# Patient Record
Sex: MALE | Race: WHITE | Employment: OTHER | URBAN - METROPOLITAN AREA
[De-identification: names, ages, dates, MRNs, and addresses within clinical notes are randomized per-mention and may not be internally consistent; named-entity substitution may affect disease eponyms.]

---

## 2017-01-18 ENCOUNTER — OFFICE VISIT (OUTPATIENT)
Dept: CARDIOLOGY CLINIC | Age: 67
End: 2017-01-18

## 2017-01-18 VITALS
DIASTOLIC BLOOD PRESSURE: 70 MMHG | HEART RATE: 66 BPM | BODY MASS INDEX: 35.42 KG/M2 | WEIGHT: 253 LBS | OXYGEN SATURATION: 97 % | SYSTOLIC BLOOD PRESSURE: 110 MMHG | HEIGHT: 71 IN

## 2017-01-18 DIAGNOSIS — I42.9 CARDIOMYOPATHY (HCC): ICD-10-CM

## 2017-01-18 DIAGNOSIS — I10 ESSENTIAL HYPERTENSION, MALIGNANT: ICD-10-CM

## 2017-01-18 DIAGNOSIS — I25.119 CORONARY ARTERY DISEASE INVOLVING NATIVE HEART WITH ANGINA PECTORIS, UNSPECIFIED VESSEL OR LESION TYPE (HCC): Primary | ICD-10-CM

## 2017-01-18 DIAGNOSIS — I25.119 CORONARY ARTERY DISEASE INVOLVING NATIVE CORONARY ARTERY OF NATIVE HEART WITH ANGINA PECTORIS (HCC): ICD-10-CM

## 2017-01-18 PROCEDURE — 99214 OFFICE O/P EST MOD 30 MIN: CPT | Performed by: NURSE PRACTITIONER

## 2017-01-18 RX ORDER — METOPROLOL SUCCINATE 25 MG/1
25 TABLET, EXTENDED RELEASE ORAL DAILY
COMMUNITY
End: 2017-03-15 | Stop reason: SDUPTHER

## 2017-01-18 RX ORDER — NITROGLYCERIN 0.4 MG/1
0.4 TABLET SUBLINGUAL EVERY 5 MIN PRN
Qty: 25 TABLET | Refills: 0 | Status: SHIPPED | OUTPATIENT
Start: 2017-01-18 | End: 2017-01-24 | Stop reason: SDUPTHER

## 2017-01-18 RX ORDER — ASPIRIN 81 MG/1
81 TABLET, CHEWABLE ORAL DAILY
Status: ON HOLD | COMMUNITY
End: 2021-03-15 | Stop reason: HOSPADM

## 2017-01-18 RX ORDER — HYDROCHLOROTHIAZIDE 25 MG/1
25 TABLET ORAL DAILY
COMMUNITY
End: 2017-01-18 | Stop reason: SDUPTHER

## 2017-01-18 RX ORDER — CLOTRIMAZOLE AND BETAMETHASONE DIPROPIONATE 10; .64 MG/G; MG/G
CREAM TOPICAL 2 TIMES DAILY
COMMUNITY
End: 2017-04-05

## 2017-01-18 RX ORDER — HYDROCHLOROTHIAZIDE 25 MG/1
25 TABLET ORAL DAILY
Qty: 30 TABLET | Refills: 5 | Status: SHIPPED | OUTPATIENT
Start: 2017-01-18 | End: 2017-04-05

## 2017-01-24 RX ORDER — NITROGLYCERIN 0.4 MG/1
0.4 TABLET SUBLINGUAL EVERY 5 MIN PRN
Qty: 25 TABLET | Refills: 0 | Status: SHIPPED | OUTPATIENT
Start: 2017-01-24 | End: 2017-02-13 | Stop reason: SDUPTHER

## 2017-02-01 ENCOUNTER — OFFICE VISIT (OUTPATIENT)
Dept: CARDIOLOGY CLINIC | Age: 67
End: 2017-02-01

## 2017-02-01 VITALS
HEIGHT: 71 IN | WEIGHT: 248 LBS | SYSTOLIC BLOOD PRESSURE: 150 MMHG | BODY MASS INDEX: 34.72 KG/M2 | HEART RATE: 64 BPM | DIASTOLIC BLOOD PRESSURE: 80 MMHG

## 2017-02-01 DIAGNOSIS — I25.119 CORONARY ARTERY DISEASE INVOLVING NATIVE CORONARY ARTERY OF NATIVE HEART WITH ANGINA PECTORIS (HCC): ICD-10-CM

## 2017-02-01 DIAGNOSIS — E78.2 MIXED HYPERLIPIDEMIA: ICD-10-CM

## 2017-02-01 DIAGNOSIS — I10 ESSENTIAL HYPERTENSION: Primary | ICD-10-CM

## 2017-02-01 DIAGNOSIS — I10 ESSENTIAL HYPERTENSION, BENIGN: ICD-10-CM

## 2017-02-01 PROCEDURE — 99214 OFFICE O/P EST MOD 30 MIN: CPT | Performed by: NURSE PRACTITIONER

## 2017-02-02 LAB
ANION GAP SERPL CALCULATED.3IONS-SCNC: 8 MMOL/L (ref 7–16)
BUN BLDV-MCNC: 25 MG/DL (ref 7–18)
CALCIUM SERPL-MCNC: 8.9 MG/DL (ref 8.5–10.1)
CHLORIDE BLD-SCNC: 105 MMOL/L (ref 98–107)
CO2: 25 MMOL/L (ref 21–32)
CREATININE + EGFR PANEL: 0.93 MG/DL (ref 0.6–1.3)
GFR AFRICAN AMERICAN: > 60 ML/MIN/1.73M2
GFR NON-AFRICAN AMERICAN: > 60 ML/MIN/1.73M2
GLUCOSE: 94 MG/DL (ref 74–106)
POTASSIUM SERPL-SCNC: 3.9 MMOL/L (ref 3.5–5.1)
SODIUM BLD-SCNC: 138 MMOL/L (ref 136–145)

## 2017-02-13 RX ORDER — NITROGLYCERIN 0.4 MG/1
0.4 TABLET SUBLINGUAL EVERY 5 MIN PRN
Qty: 25 TABLET | Refills: 1 | Status: SHIPPED | OUTPATIENT
Start: 2017-02-13 | End: 2017-04-10 | Stop reason: SDUPTHER

## 2017-03-15 RX ORDER — METOPROLOL SUCCINATE 50 MG/1
50 TABLET, EXTENDED RELEASE ORAL DAILY
Qty: 30 TABLET | Refills: 6 | Status: SHIPPED | OUTPATIENT
Start: 2017-03-15 | End: 2017-04-05

## 2017-03-22 ENCOUNTER — OFFICE VISIT (OUTPATIENT)
Dept: CARDIOLOGY CLINIC | Age: 67
End: 2017-03-22

## 2017-03-22 VITALS
DIASTOLIC BLOOD PRESSURE: 90 MMHG | WEIGHT: 241 LBS | SYSTOLIC BLOOD PRESSURE: 160 MMHG | HEART RATE: 64 BPM | BODY MASS INDEX: 33.74 KG/M2 | HEIGHT: 71 IN

## 2017-03-22 DIAGNOSIS — J20.9 ACUTE BRONCHITIS, UNSPECIFIED ORGANISM: ICD-10-CM

## 2017-03-22 DIAGNOSIS — I25.119 CORONARY ARTERY DISEASE INVOLVING NATIVE CORONARY ARTERY OF NATIVE HEART WITH ANGINA PECTORIS (HCC): ICD-10-CM

## 2017-03-22 DIAGNOSIS — I42.9 CARDIOMYOPATHY (HCC): ICD-10-CM

## 2017-03-22 DIAGNOSIS — R06.02 SOB (SHORTNESS OF BREATH): Primary | ICD-10-CM

## 2017-03-22 PROCEDURE — 99214 OFFICE O/P EST MOD 30 MIN: CPT | Performed by: NURSE PRACTITIONER

## 2017-03-22 RX ORDER — CARVEDILOL 12.5 MG/1
12.5 TABLET ORAL 2 TIMES DAILY WITH MEALS
Qty: 60 TABLET | Refills: 3 | Status: SHIPPED | OUTPATIENT
Start: 2017-03-22 | End: 2017-04-05 | Stop reason: SDUPTHER

## 2017-03-28 LAB
ANION GAP SERPL CALCULATED.3IONS-SCNC: 8 MMOL/L (ref 7–16)
B-TYPE NATRIURETIC PEPTIDE: 2131 PG/ML
BUN BLDV-MCNC: 24 MG/DL (ref 7–18)
CALCIUM SERPL-MCNC: 8.8 MG/DL (ref 8.5–10.1)
CHLORIDE BLD-SCNC: 105 MMOL/L (ref 98–107)
CO2: 26 MMOL/L (ref 21–32)
CREATININE + EGFR PANEL: 0.78 MG/DL (ref 0.6–1.3)
GFR AFRICAN AMERICAN: > 60 ML/MIN/1.73M2
GFR NON-AFRICAN AMERICAN: > 60 ML/MIN/1.73M2
GLUCOSE: 97 MG/DL (ref 74–106)
POTASSIUM SERPL-SCNC: 3.6 MMOL/L (ref 3.5–5.1)
SODIUM BLD-SCNC: 139 MMOL/L (ref 136–145)

## 2017-04-03 RX ORDER — ROFLUMILAST 500 UG/1
TABLET ORAL
Qty: 30 TABLET | Refills: 11 | Status: SHIPPED | OUTPATIENT
Start: 2017-04-03 | End: 2018-03-23 | Stop reason: SDUPTHER

## 2017-04-03 RX ORDER — TIOTROPIUM BROMIDE 18 UG/1
CAPSULE ORAL; RESPIRATORY (INHALATION)
Qty: 30 CAPSULE | Refills: 11 | Status: SHIPPED | OUTPATIENT
Start: 2017-04-03 | End: 2018-01-24

## 2017-04-04 ENCOUNTER — OFFICE VISIT (OUTPATIENT)
Dept: PULMONOLOGY | Age: 67
End: 2017-04-04

## 2017-04-04 ENCOUNTER — TELEPHONE (OUTPATIENT)
Dept: CARDIOLOGY CLINIC | Age: 67
End: 2017-04-04

## 2017-04-04 VITALS
OXYGEN SATURATION: 98 % | WEIGHT: 242 LBS | BODY MASS INDEX: 33.75 KG/M2 | DIASTOLIC BLOOD PRESSURE: 96 MMHG | HEART RATE: 64 BPM | SYSTOLIC BLOOD PRESSURE: 145 MMHG

## 2017-04-04 DIAGNOSIS — I25.119 CORONARY ARTERY DISEASE INVOLVING NATIVE CORONARY ARTERY OF NATIVE HEART WITH ANGINA PECTORIS (HCC): ICD-10-CM

## 2017-04-04 DIAGNOSIS — R06.02 SHORTNESS OF BREATH: Primary | ICD-10-CM

## 2017-04-04 DIAGNOSIS — I42.9 CARDIOMYOPATHY (HCC): ICD-10-CM

## 2017-04-04 DIAGNOSIS — R05.3 CHRONIC COUGH: ICD-10-CM

## 2017-04-04 DIAGNOSIS — I10 ESSENTIAL HYPERTENSION, BENIGN: ICD-10-CM

## 2017-04-04 DIAGNOSIS — J44.9 COPD, MODERATE (HCC): ICD-10-CM

## 2017-04-04 PROCEDURE — 99214 OFFICE O/P EST MOD 30 MIN: CPT | Performed by: INTERNAL MEDICINE

## 2017-04-04 RX ORDER — ALBUTEROL SULFATE 90 UG/1
2 AEROSOL, METERED RESPIRATORY (INHALATION) EVERY 4 HOURS PRN
Qty: 1 INHALER | Refills: 5 | Status: SHIPPED | OUTPATIENT
Start: 2017-04-04 | End: 2018-07-11 | Stop reason: SDUPTHER

## 2017-04-04 RX ORDER — DOXYCYCLINE HYCLATE 100 MG/1
100 CAPSULE ORAL DAILY
Qty: 10 CAPSULE | Refills: 3 | Status: SHIPPED | OUTPATIENT
Start: 2017-04-04 | End: 2017-04-05

## 2017-04-05 ENCOUNTER — OFFICE VISIT (OUTPATIENT)
Dept: CARDIOLOGY CLINIC | Age: 67
End: 2017-04-05

## 2017-04-05 VITALS
DIASTOLIC BLOOD PRESSURE: 74 MMHG | HEART RATE: 70 BPM | HEIGHT: 71 IN | SYSTOLIC BLOOD PRESSURE: 154 MMHG | BODY MASS INDEX: 33.75 KG/M2

## 2017-04-05 DIAGNOSIS — I10 ESSENTIAL HYPERTENSION, BENIGN: Primary | ICD-10-CM

## 2017-04-05 PROCEDURE — 99999 PR OFFICE/OUTPT VISIT,PROCEDURE ONLY: CPT | Performed by: NURSE PRACTITIONER

## 2017-04-05 RX ORDER — DIGOXIN 125 MCG
TABLET ORAL DAILY
COMMUNITY
End: 2020-12-16

## 2017-04-05 RX ORDER — CARVEDILOL 12.5 MG/1
12.5 TABLET ORAL 2 TIMES DAILY WITH MEALS
Qty: 60 TABLET | Refills: 6 | Status: SHIPPED | OUTPATIENT
Start: 2017-04-05 | End: 2017-05-03

## 2017-04-05 RX ORDER — LOSARTAN POTASSIUM 25 MG/1
50 TABLET ORAL DAILY
COMMUNITY
End: 2017-04-05 | Stop reason: SDUPTHER

## 2017-04-05 RX ORDER — LOSARTAN POTASSIUM 50 MG/1
50 TABLET ORAL DAILY
Qty: 30 TABLET | Refills: 6 | Status: SHIPPED | OUTPATIENT
Start: 2017-04-05 | End: 2017-06-27 | Stop reason: DRUGHIGH

## 2017-04-10 RX ORDER — NITROGLYCERIN 0.4 MG/1
0.4 TABLET SUBLINGUAL EVERY 5 MIN PRN
Qty: 25 TABLET | Refills: 1 | Status: SHIPPED | OUTPATIENT
Start: 2017-04-10 | End: 2017-04-21 | Stop reason: SDUPTHER

## 2017-04-21 RX ORDER — NITROGLYCERIN 0.4 MG/1
0.4 TABLET SUBLINGUAL EVERY 5 MIN PRN
Qty: 25 TABLET | Refills: 1 | Status: SHIPPED | OUTPATIENT
Start: 2017-04-21 | End: 2018-08-30 | Stop reason: SDUPTHER

## 2017-05-03 ENCOUNTER — OFFICE VISIT (OUTPATIENT)
Dept: CARDIOLOGY CLINIC | Age: 67
End: 2017-05-03

## 2017-05-03 VITALS
BODY MASS INDEX: 33.6 KG/M2 | HEIGHT: 71 IN | DIASTOLIC BLOOD PRESSURE: 60 MMHG | HEART RATE: 58 BPM | SYSTOLIC BLOOD PRESSURE: 120 MMHG | WEIGHT: 240 LBS

## 2017-05-03 DIAGNOSIS — I10 ESSENTIAL HYPERTENSION, BENIGN: ICD-10-CM

## 2017-05-03 DIAGNOSIS — I42.9 CARDIOMYOPATHY (HCC): Primary | ICD-10-CM

## 2017-05-03 DIAGNOSIS — E78.2 MIXED HYPERLIPIDEMIA: ICD-10-CM

## 2017-05-03 PROCEDURE — 99214 OFFICE O/P EST MOD 30 MIN: CPT | Performed by: NURSE PRACTITIONER

## 2017-05-03 RX ORDER — CARVEDILOL 25 MG/1
25 TABLET ORAL 2 TIMES DAILY WITH MEALS
Qty: 60 TABLET | Refills: 3 | Status: SHIPPED | OUTPATIENT
Start: 2017-05-03 | End: 2017-05-16 | Stop reason: SDUPTHER

## 2017-05-16 ENCOUNTER — TELEPHONE (OUTPATIENT)
Dept: CARDIOLOGY CLINIC | Age: 67
End: 2017-05-16

## 2017-05-16 RX ORDER — CARVEDILOL 25 MG/1
25 TABLET ORAL 2 TIMES DAILY WITH MEALS
Qty: 60 TABLET | Refills: 3 | Status: SHIPPED | OUTPATIENT
Start: 2017-05-16 | End: 2017-10-03 | Stop reason: SDUPTHER

## 2017-06-13 ENCOUNTER — TELEPHONE (OUTPATIENT)
Dept: CARDIOLOGY CLINIC | Age: 67
End: 2017-06-13

## 2017-06-20 ENCOUNTER — TELEPHONE (OUTPATIENT)
Dept: CARDIOLOGY CLINIC | Age: 67
End: 2017-06-20

## 2017-06-22 ENCOUNTER — TELEPHONE (OUTPATIENT)
Dept: CARDIOLOGY CLINIC | Age: 67
End: 2017-06-22

## 2017-06-22 RX ORDER — FUROSEMIDE 40 MG/1
40 TABLET ORAL DAILY
Qty: 30 TABLET | Refills: 3 | Status: SHIPPED | OUTPATIENT
Start: 2017-06-22 | End: 2017-10-16 | Stop reason: SDUPTHER

## 2017-06-27 ENCOUNTER — TELEPHONE (OUTPATIENT)
Dept: CARDIOLOGY CLINIC | Age: 67
End: 2017-06-27

## 2017-06-27 RX ORDER — LOSARTAN POTASSIUM 100 MG/1
100 TABLET ORAL DAILY
Qty: 30 TABLET | Refills: 5 | Status: SHIPPED | OUTPATIENT
Start: 2017-06-27 | End: 2018-01-09 | Stop reason: SDUPTHER

## 2017-07-03 ENCOUNTER — TELEPHONE (OUTPATIENT)
Dept: CARDIOLOGY CLINIC | Age: 67
End: 2017-07-03

## 2017-07-03 RX ORDER — DOXAZOSIN MESYLATE 4 MG/1
4 TABLET ORAL NIGHTLY
Qty: 30 TABLET | Refills: 3 | Status: SHIPPED | OUTPATIENT
Start: 2017-07-03 | End: 2017-10-20 | Stop reason: SDUPTHER

## 2017-07-14 ENCOUNTER — HOSPITAL ENCOUNTER (OUTPATIENT)
Dept: OTHER | Age: 67
Discharge: HOME | End: 2017-07-14
Attending: NURSE PRACTITIONER | Admitting: NURSE PRACTITIONER

## 2017-07-19 ENCOUNTER — OFFICE VISIT (OUTPATIENT)
Dept: CARDIOLOGY CLINIC | Age: 67
End: 2017-07-19

## 2017-07-19 VITALS
HEIGHT: 71 IN | SYSTOLIC BLOOD PRESSURE: 130 MMHG | BODY MASS INDEX: 34.58 KG/M2 | DIASTOLIC BLOOD PRESSURE: 60 MMHG | WEIGHT: 247 LBS | HEART RATE: 62 BPM

## 2017-07-19 DIAGNOSIS — I42.9 CARDIOMYOPATHY (HCC): Primary | ICD-10-CM

## 2017-07-19 DIAGNOSIS — I10 ESSENTIAL HYPERTENSION, BENIGN: ICD-10-CM

## 2017-07-19 DIAGNOSIS — E78.2 MIXED HYPERLIPIDEMIA: ICD-10-CM

## 2017-07-19 DIAGNOSIS — I25.119 CORONARY ARTERY DISEASE INVOLVING NATIVE CORONARY ARTERY OF NATIVE HEART WITH ANGINA PECTORIS (HCC): ICD-10-CM

## 2017-07-19 DIAGNOSIS — R06.02 SOB (SHORTNESS OF BREATH): ICD-10-CM

## 2017-07-19 PROCEDURE — 1123F ACP DISCUSS/DSCN MKR DOCD: CPT | Performed by: NURSE PRACTITIONER

## 2017-07-19 PROCEDURE — G8417 CALC BMI ABV UP PARAM F/U: HCPCS | Performed by: NURSE PRACTITIONER

## 2017-07-19 PROCEDURE — 99214 OFFICE O/P EST MOD 30 MIN: CPT | Performed by: NURSE PRACTITIONER

## 2017-07-19 PROCEDURE — 4040F PNEUMOC VAC/ADMIN/RCVD: CPT | Performed by: NURSE PRACTITIONER

## 2017-07-19 PROCEDURE — G8598 ASA/ANTIPLAT THER USED: HCPCS | Performed by: NURSE PRACTITIONER

## 2017-07-19 PROCEDURE — 1036F TOBACCO NON-USER: CPT | Performed by: NURSE PRACTITIONER

## 2017-07-19 PROCEDURE — G8427 DOCREV CUR MEDS BY ELIG CLIN: HCPCS | Performed by: NURSE PRACTITIONER

## 2017-07-19 PROCEDURE — 3017F COLORECTAL CA SCREEN DOC REV: CPT | Performed by: NURSE PRACTITIONER

## 2017-08-04 ENCOUNTER — HOSPITAL ENCOUNTER (OUTPATIENT)
Dept: OTHER | Age: 67
Discharge: HOME | End: 2017-08-04
Attending: NURSE PRACTITIONER | Admitting: NURSE PRACTITIONER

## 2017-08-04 LAB
LEFT VENTRICULAR EJECTION FRACTION HIGH VALUE: 50 %
LEFT VENTRICULAR EJECTION FRACTION MODE: NORMAL
LV EF: 50 %
LVEF MODALITY: NORMAL

## 2017-08-31 ENCOUNTER — TELEPHONE (OUTPATIENT)
Dept: CARDIOLOGY CLINIC | Age: 67
End: 2017-08-31

## 2017-09-20 ENCOUNTER — OFFICE VISIT (OUTPATIENT)
Dept: CARDIOLOGY CLINIC | Age: 67
End: 2017-09-20

## 2017-09-20 VITALS
DIASTOLIC BLOOD PRESSURE: 60 MMHG | BODY MASS INDEX: 34.72 KG/M2 | HEIGHT: 71 IN | HEART RATE: 62 BPM | SYSTOLIC BLOOD PRESSURE: 120 MMHG | WEIGHT: 248 LBS

## 2017-09-20 DIAGNOSIS — I10 ESSENTIAL HYPERTENSION: Primary | ICD-10-CM

## 2017-09-20 DIAGNOSIS — E78.2 MIXED HYPERLIPIDEMIA: ICD-10-CM

## 2017-09-20 DIAGNOSIS — I25.119 CORONARY ARTERY DISEASE INVOLVING NATIVE CORONARY ARTERY OF NATIVE HEART WITH ANGINA PECTORIS (HCC): ICD-10-CM

## 2017-09-20 DIAGNOSIS — I10 ESSENTIAL HYPERTENSION, MALIGNANT: ICD-10-CM

## 2017-09-20 LAB
ALT SERPL-CCNC: 18 U/L (ref 12–78)
ANION GAP SERPL CALCULATED.3IONS-SCNC: 7 MMOL/L (ref 7–16)
AST SERPL-CCNC: 12 U/L (ref 15–37)
B-TYPE NATRIURETIC PEPTIDE: 402 PG/ML
BUN BLDV-MCNC: 30 MG/DL (ref 7–18)
CALCIUM SERPL-MCNC: 9 MG/DL (ref 8.5–10.1)
CHLORIDE BLD-SCNC: 104 MMOL/L (ref 98–107)
CHOLESTEROL, STONE: 113 MG/DL
CO2: 27 MMOL/L (ref 21–32)
CREATININE + EGFR PANEL: 0.9 MG/DL (ref 0.6–1.3)
GFR AFRICAN AMERICAN: > 60 ML/MIN/1.73M2
GFR NON-AFRICAN AMERICAN: > 60 ML/MIN/1.73M2
GLUCOSE: 102 MG/DL (ref 74–106)
HDLC SERPL-MCNC: 35 MG/DL (ref 40–60)
LDL CHOLESTEROL CALCULATED: 60 MG/DL (ref 0–99)
POTASSIUM SERPL-SCNC: 3.3 MMOL/L (ref 3.5–5.1)
SODIUM BLD-SCNC: 138 MMOL/L (ref 136–145)
TRIGL SERPL-MCNC: 89 MG/DL (ref 0–149)
VLDLC SERPL CALC-MCNC: 18 MG/DL (ref 0–40)

## 2017-09-20 PROCEDURE — 1123F ACP DISCUSS/DSCN MKR DOCD: CPT | Performed by: NURSE PRACTITIONER

## 2017-09-20 PROCEDURE — 1036F TOBACCO NON-USER: CPT | Performed by: NURSE PRACTITIONER

## 2017-09-20 PROCEDURE — 99214 OFFICE O/P EST MOD 30 MIN: CPT | Performed by: NURSE PRACTITIONER

## 2017-09-20 PROCEDURE — 4040F PNEUMOC VAC/ADMIN/RCVD: CPT | Performed by: NURSE PRACTITIONER

## 2017-09-20 PROCEDURE — G8598 ASA/ANTIPLAT THER USED: HCPCS | Performed by: NURSE PRACTITIONER

## 2017-09-20 PROCEDURE — 3017F COLORECTAL CA SCREEN DOC REV: CPT | Performed by: NURSE PRACTITIONER

## 2017-09-20 PROCEDURE — G8417 CALC BMI ABV UP PARAM F/U: HCPCS | Performed by: NURSE PRACTITIONER

## 2017-09-20 PROCEDURE — G8427 DOCREV CUR MEDS BY ELIG CLIN: HCPCS | Performed by: NURSE PRACTITIONER

## 2017-09-20 RX ORDER — POTASSIUM CHLORIDE 20 MEQ/1
20 TABLET, EXTENDED RELEASE ORAL DAILY
Qty: 90 TABLET | Refills: 3 | Status: SHIPPED | OUTPATIENT
Start: 2017-09-20 | End: 2018-08-20 | Stop reason: SDUPTHER

## 2017-10-03 RX ORDER — CARVEDILOL 25 MG/1
TABLET ORAL
Qty: 60 TABLET | Refills: 0 | Status: SHIPPED | OUTPATIENT
Start: 2017-10-03 | End: 2017-11-09 | Stop reason: SDUPTHER

## 2017-10-04 ENCOUNTER — TELEPHONE (OUTPATIENT)
Dept: CARDIOLOGY CLINIC | Age: 67
End: 2017-10-04

## 2017-10-16 RX ORDER — FUROSEMIDE 40 MG/1
40 TABLET ORAL DAILY
Qty: 30 TABLET | Refills: 3 | Status: SHIPPED | OUTPATIENT
Start: 2017-10-16 | End: 2017-12-12 | Stop reason: SDUPTHER

## 2017-10-23 ENCOUNTER — TELEPHONE (OUTPATIENT)
Dept: PULMONOLOGY | Age: 67
End: 2017-10-23

## 2017-10-24 RX ORDER — DOXAZOSIN MESYLATE 4 MG/1
4 TABLET ORAL NIGHTLY
Qty: 30 TABLET | Refills: 7 | Status: SHIPPED | OUTPATIENT
Start: 2017-10-24 | End: 2017-12-22 | Stop reason: SDUPTHER

## 2017-10-24 NOTE — TELEPHONE ENCOUNTER
Patient called back stating he will be out of spiriva today and will be out of tudorza he will be out tomorrow.

## 2017-11-14 RX ORDER — CARVEDILOL 25 MG/1
TABLET ORAL
Qty: 90 TABLET | Refills: 3 | Status: SHIPPED | OUTPATIENT
Start: 2017-11-14 | End: 2017-12-27 | Stop reason: SDUPTHER

## 2017-12-12 RX ORDER — FUROSEMIDE 40 MG/1
40 TABLET ORAL DAILY
Qty: 90 TABLET | Refills: 3 | Status: SHIPPED | OUTPATIENT
Start: 2017-12-12 | End: 2018-10-10 | Stop reason: SDUPTHER

## 2017-12-22 RX ORDER — DOXAZOSIN MESYLATE 4 MG/1
4 TABLET ORAL NIGHTLY
Qty: 90 TABLET | Refills: 1 | Status: SHIPPED | OUTPATIENT
Start: 2017-12-22 | End: 2018-07-09 | Stop reason: SDUPTHER

## 2017-12-27 RX ORDER — CARVEDILOL 25 MG/1
TABLET ORAL
Qty: 90 TABLET | Refills: 3 | Status: SHIPPED | OUTPATIENT
Start: 2017-12-27 | End: 2018-06-22 | Stop reason: SDUPTHER

## 2018-01-09 RX ORDER — LOSARTAN POTASSIUM 100 MG/1
100 TABLET ORAL DAILY
Qty: 30 TABLET | Refills: 11 | Status: SHIPPED | OUTPATIENT
Start: 2018-01-09 | End: 2018-08-14 | Stop reason: SDUPTHER

## 2018-01-24 ENCOUNTER — OFFICE VISIT (OUTPATIENT)
Dept: CARDIOLOGY CLINIC | Age: 68
End: 2018-01-24

## 2018-01-24 VITALS
DIASTOLIC BLOOD PRESSURE: 70 MMHG | BODY MASS INDEX: 36.4 KG/M2 | HEIGHT: 71 IN | WEIGHT: 260 LBS | SYSTOLIC BLOOD PRESSURE: 124 MMHG | HEART RATE: 56 BPM

## 2018-01-24 DIAGNOSIS — I25.119 CORONARY ARTERY DISEASE INVOLVING NATIVE CORONARY ARTERY OF NATIVE HEART WITH ANGINA PECTORIS (HCC): Primary | ICD-10-CM

## 2018-01-24 DIAGNOSIS — R06.02 SHORTNESS OF BREATH: ICD-10-CM

## 2018-01-24 DIAGNOSIS — I10 ESSENTIAL HYPERTENSION, MALIGNANT: ICD-10-CM

## 2018-01-24 LAB
ALBUMIN SERUM: 3.3 G/DL (ref 3.4–5)
ANION GAP SERPL CALCULATED.3IONS-SCNC: 8 MMOL/L (ref 7–16)
BUN BLDV-MCNC: 27 MG/DL (ref 7–18)
CALCIUM SERPL-MCNC: 8.8 MG/DL (ref 8.5–10.1)
CHLORIDE BLD-SCNC: 103 MMOL/L (ref 98–107)
CO2: 27 MMOL/L (ref 21–32)
CREATININE + EGFR PANEL: 1 MG/DL (ref 0.6–1.3)
GFR AFRICAN AMERICAN: > 60 ML/MIN/1.73M2
GFR NON-AFRICAN AMERICAN: > 60 ML/MIN/1.73M2
GLUCOSE: 98 MG/DL (ref 74–106)
PHOSPHORUS: 3.6 MG/DL (ref 2.5–4.9)
POTASSIUM SERPL-SCNC: 3.7 MMOL/L (ref 3.5–5.1)
SODIUM BLD-SCNC: 138 MMOL/L (ref 136–145)

## 2018-01-24 PROCEDURE — 4040F PNEUMOC VAC/ADMIN/RCVD: CPT | Performed by: NURSE PRACTITIONER

## 2018-01-24 PROCEDURE — G8427 DOCREV CUR MEDS BY ELIG CLIN: HCPCS | Performed by: NURSE PRACTITIONER

## 2018-01-24 PROCEDURE — 1036F TOBACCO NON-USER: CPT | Performed by: NURSE PRACTITIONER

## 2018-01-24 PROCEDURE — G8484 FLU IMMUNIZE NO ADMIN: HCPCS | Performed by: NURSE PRACTITIONER

## 2018-01-24 PROCEDURE — G8417 CALC BMI ABV UP PARAM F/U: HCPCS | Performed by: NURSE PRACTITIONER

## 2018-01-24 PROCEDURE — G8598 ASA/ANTIPLAT THER USED: HCPCS | Performed by: NURSE PRACTITIONER

## 2018-01-24 PROCEDURE — 99214 OFFICE O/P EST MOD 30 MIN: CPT | Performed by: NURSE PRACTITIONER

## 2018-01-24 PROCEDURE — 3017F COLORECTAL CA SCREEN DOC REV: CPT | Performed by: NURSE PRACTITIONER

## 2018-01-24 PROCEDURE — 1123F ACP DISCUSS/DSCN MKR DOCD: CPT | Performed by: NURSE PRACTITIONER

## 2018-01-24 NOTE — LETTER
[address] Cardiology [facility]  [address] [place] [address]  Phone: [phone]  Fax: [phone]    Emilia London NP        February 2, 2018     [place], [place] [address]    Patient: Trung Desai  MR Number: [id number]  YOB: 1950  Date of Visit: 1/24/2018    Dear Dr. Gu:    Thank you for the request for consultation for Jane Benavidez to me for the evaluation of Mr Eric Nieves. Below are the relevant portions of my assessment and plan of care. [address]     Outpatient Follow Up Note    Trung Desai is 80 y.o. male who presents today for FU. He woke up with chest pain 1/11/17, had acute anterior wall  MI/STEMI, taken directly to cath lab had cardiogenic shock, had PCI to LAD. Echo showed decrease in EF to  30-35%. He had recent echo and EF is now 50%. He has not had any admissions for heart failure. He has history of HTN, LVH and hyperlipidemia. Subjective: The patient does not have swelling. The patient is not experiencing palpitations or dizziness. He has not had any chest pain, SOB, no palpitations, edema. He has gained a little weight back.         Past Medical History:   Diagnosis Date    COPD [facility] obstructive [facility] disease) [facility]     Coronary artery disease involving native coronary artery of native heart with angina pectoris ([facility] [address] 1/18/2017    Depression     Diabetes mellitus (HCC)     Dysphagia     Heart enlarged     Hyperlipidemia     Hypertension     Obesity     Pneumonia     Sleep apnea     has cpap but hasn't used \"in a long time\"     Social History:    History   Smoking Status    Former Smoker    Packs/day: 0.25    Years: 25.00    Quit date: 1/1/2011   Smokeless Tobacco    Never Used     Comment: more than 5 cigars per day     Current Medications:  Current Outpatient Prescriptions   Medication Sig Dispense Refill  losartan (COZAAR) 100 MG tablet TAKE 1 TABLET BY MOUTH DAILY 30 tablet 11    carvedilol (COREG) 25 MG tablet TAKE 1 TABLET BY MOUTH TWICE DAILY WITH MEALS 90 tablet 3    doxazosin (CARDURA) 4 MG tablet Take 1 tablet by mouth nightly 90 tablet 1    furosemide (LASIX) 40 MG tablet Take 1 tablet by mouth daily 90 tablet 3    ticagrelor (BRILINTA) 90 MG TABS tablet Take 1 tablet by mouth 2 times daily 180 tablet 3    potassium chloride (KLOR-CON M) 20 MEQ extended release tablet Take 1 tablet by mouth daily 90 tablet 3    digoxin (LANOXIN) 125 MCG tablet Take 125 mcg by mouth daily 1/2 tab daily      albuterol sulfate HFA (PROVENTIL HFA) 108 (90 BASE) MCG/ACT inhaler Inhale 2 puffs into the lungs every 4 hours as needed for Wheezing 1 Inhaler 5    DALIRESP 500 MCG tablet TAKE 1 TABLET BY MOUTH EVERY DAY 30 tablet 11    aspirin 81 MG chewable tablet Take 81 mg by mouth daily      clonazePAM (KLONOPIN) 1 MG tablet Take 1 mg by mouth 2 times daily as needed for Anxiety      metFORMIN (GLUCOPHAGE) 500 MG tablet Take 500 mg by mouth 2 times daily (with meals)      simvastatin (ZOCOR) 40 MG tablet Take 0.5 tablets by mouth nightly 30 tablet 3    nitroGLYCERIN (NITROSTAT) 0.4 MG SL tablet Place 1 tablet under the tongue every 5 minutes as needed for Chest pain 25 tablet 1     No current facility-administered medications for this visit. REVIEW OF SYSTEMS:    CONSTITUTIONAL: No major weight gain or loss, fatigue, weakness, night sweats treated with clonidine   HEENT: No new vision difficulties or ringing in the ears. RESPIRATORY: No new SOB, PND, orthopnea ,  - cough. CARDIOVASCULAR: See HPI  GI: No nausea, vomiting, diarrhea, constipation, abdominal pain or changes in bowel habits. : No urinary frequency, urgency, incontinence hematuria or dysuria. SKIN: No cyanosis or skin lesions. MUSCULOSKELETAL: No new muscle or joint pain. NEUROLOGICAL: No syncope or TIA-like symptoms. If you have questions, please do not hesitate to call me. I look forward to following Chrissy Hernandez along with you.     Sincerely,        Kristine Azul, NP

## 2018-01-24 NOTE — LETTER
LDLCALC 60 09/20/2017    LDLCALC 72 08/07/2015    LDLCALC 82 04/16/2012     Lab Results   Component Value Date    LABVLDL 18 09/20/2017    LABVLDL 10 08/07/2015    LABVLDL 13 04/16/2012     Radiology Review:  Pertinent images / reports were reviewed as a part of this visit and reveals the following:  3/16 lexiscan   There is an inferior fixed defect consistent with diaphragmatic attenuation. There is no evidence of stress induced ischemia. Normal LV function. ECHO [facility] 1/17  EF 25-30%       Cardiac cath: '04: 30% origin LAD, 30-40% mid-LAD    CATH 1/17_ single vessel disease 100% prox LAD, overlapping synergy stents to prox LAD, successful POBA of distal LAD with balloon, LV 30-35%    Assessment:     1. HTN (hypertension) stable    2. Cardiomyopathy-since anterior wall MI 1/17   no signs of heart failure, EF on recent echo 50%          3. Anterior wall MI- overlapping stents to LAD 1/17 no chest pain, on brilenta  4. Hyperlipidemia- 1/17  TC-103, Trig-71, HDL-335, LDL-54 on zocor    9/17  TC-113, Trig-89, HDL-35, LDL 60    Plan:   No change in meds  FU 6 months    The patient is not currently smoking. The risks related to smoking were reviewed with the patient. Recommend maintaining a smoke-free lifestyle. Daily weight, low sodium diet were discussed. Patient instructed to call the office with a weight gain: > 3 # over night or 5# in one week; swelling, SOB/orthopnea/PND        Tobacco Cessation Counseling-Y/  Blood Pressure Retake-Y  Documentation of Communication with PCP-Y/  CAD Antiplatelet Therapy-Y/  CAD Lipid Lowering Therapy-Y/  HF-A Fib Warfarin Therapy-/N      Thank you for allowing to us to participate in the care of Angela Prior. [address]                  If you have questions, please do not hesitate to call me. I look forward to following Anni Arango along with you.     Sincerely,        Silviano Salas NP

## 2018-02-02 NOTE — COMMUNICATION BODY
[address]     Outpatient Follow Up Note    Peter Jacobsen is 80 y.o. male who presents today for FU. He woke up with chest pain 1/11/17, had acute anterior wall  MI/STEMI, taken directly to cath lab had cardiogenic shock, had PCI to LAD. Echo showed decrease in EF to  30-35%. He had recent echo and EF is now 50%. He has not had any admissions for heart failure. He has history of HTN, LVH and hyperlipidemia. Subjective: The patient does not have swelling. The patient is not experiencing palpitations or dizziness. He has not had any chest pain, SOB, no palpitations, edema. He has gained a little weight back.         Past Medical History:   Diagnosis Date    COPD [facility] obstructive [facility] disease) ([facility] [address]     Coronary artery disease involving native coronary artery of native heart with angina pectoris ([facility] [address] 1/18/2017    Depression     Diabetes mellitus ([facility] [address] [facility]     Dysphagia     Heart enlarged     Hyperlipidemia     Hypertension     Obesity     Pneumonia     Sleep apnea     has cpap but hasn't used \"in a long time\"     Social History:    History   Smoking Status    Former Smoker    Packs/day: 0.25    Years: 25.00    Quit date: 1/1/2011   Smokeless Tobacco    Never Used     Comment: more than 5 cigars per day     Current Medications:  Current Outpatient Prescriptions   Medication Sig Dispense Refill    losartan (COZAAR) 100 MG tablet TAKE 1 TABLET BY MOUTH DAILY 30 tablet 11    carvedilol (COREG) 25 MG tablet TAKE 1 TABLET BY MOUTH TWICE DAILY WITH MEALS 90 tablet 3    doxazosin (CARDURA) 4 MG tablet Take 1 tablet by mouth nightly 90 tablet 1    furosemide (LASIX) 40 MG tablet Take 1 tablet by mouth daily 90 tablet 3    ticagrelor (BRILINTA) 90 MG TABS tablet Take 1 tablet by mouth 2 times daily 180 tablet 3    potassium chloride (KLOR-CON M) 20 MEQ extended release tablet Take 1 tablet by mouth daily 90 tablet 3    digoxin (LANOXIN) 125 MCG tablet Take 125 mcg by

## 2018-02-20 ENCOUNTER — OFFICE VISIT (OUTPATIENT)
Dept: PULMONOLOGY | Age: 68
End: 2018-02-20

## 2018-02-20 VITALS
BODY MASS INDEX: 36.4 KG/M2 | HEART RATE: 84 BPM | SYSTOLIC BLOOD PRESSURE: 138 MMHG | RESPIRATION RATE: 16 BRPM | HEIGHT: 71 IN | DIASTOLIC BLOOD PRESSURE: 80 MMHG | WEIGHT: 260 LBS | OXYGEN SATURATION: 97 %

## 2018-02-20 DIAGNOSIS — R06.02 SHORTNESS OF BREATH: Primary | ICD-10-CM

## 2018-02-20 DIAGNOSIS — I25.119 CORONARY ARTERY DISEASE INVOLVING NATIVE CORONARY ARTERY OF NATIVE HEART WITH ANGINA PECTORIS (HCC): ICD-10-CM

## 2018-02-20 DIAGNOSIS — R05.3 CHRONIC COUGH: ICD-10-CM

## 2018-02-20 DIAGNOSIS — I25.5 ISCHEMIC CARDIOMYOPATHY: ICD-10-CM

## 2018-02-20 DIAGNOSIS — J44.9 COPD, MODERATE (HCC): ICD-10-CM

## 2018-02-20 PROCEDURE — 3017F COLORECTAL CA SCREEN DOC REV: CPT | Performed by: INTERNAL MEDICINE

## 2018-02-20 PROCEDURE — 4040F PNEUMOC VAC/ADMIN/RCVD: CPT | Performed by: INTERNAL MEDICINE

## 2018-02-20 PROCEDURE — 1036F TOBACCO NON-USER: CPT | Performed by: INTERNAL MEDICINE

## 2018-02-20 PROCEDURE — G8417 CALC BMI ABV UP PARAM F/U: HCPCS | Performed by: INTERNAL MEDICINE

## 2018-02-20 PROCEDURE — 1123F ACP DISCUSS/DSCN MKR DOCD: CPT | Performed by: INTERNAL MEDICINE

## 2018-02-20 PROCEDURE — 99214 OFFICE O/P EST MOD 30 MIN: CPT | Performed by: INTERNAL MEDICINE

## 2018-02-20 PROCEDURE — G8427 DOCREV CUR MEDS BY ELIG CLIN: HCPCS | Performed by: INTERNAL MEDICINE

## 2018-02-20 PROCEDURE — G8598 ASA/ANTIPLAT THER USED: HCPCS | Performed by: INTERNAL MEDICINE

## 2018-02-20 PROCEDURE — 3023F SPIROM DOC REV: CPT | Performed by: INTERNAL MEDICINE

## 2018-02-20 PROCEDURE — G8484 FLU IMMUNIZE NO ADMIN: HCPCS | Performed by: INTERNAL MEDICINE

## 2018-02-20 PROCEDURE — G8926 SPIRO NO PERF OR DOC: HCPCS | Performed by: INTERNAL MEDICINE

## 2018-02-20 RX ORDER — DOXYCYCLINE HYCLATE 100 MG/1
100 CAPSULE ORAL DAILY
Qty: 10 CAPSULE | Refills: 3 | Status: SHIPPED | OUTPATIENT
Start: 2018-02-20 | End: 2018-03-02

## 2018-02-20 NOTE — PROGRESS NOTES
[facility]  Progress Note  MD Roger Phillips   YOB: 1950    Date of Visit:  2/20/2018    Assessment/Plan:  1. Shortness of breath  Worse with his acute bronchitis  Probably also worse with his medication change. 2. Chronic cough  Now with green sputum. Give him another round of Doxy, helped the last time. 3. COPD, moderate ([address]  PFT 12/14  Spirometry revealed decreased FVC at 3.43 L, which is 76% predicted. FEV1 is  decreased at 2.28 L, which is 63% predicted. FEV1/FVC ratio is reduced at  66%. There is no significant change after inhaled bronchodilators. Lung  volumes reveal normal total lung capacity. Vital capacity is mildly reduced. Residual volume is at the upper limits of normal. Diffusion capacity is  normal. In comparison to a study from 2012, spirometry is similar. The  total lung capacity is increased by 21% and residual volume is increased by  45%.       IMPRESSION: Moderate obstructive lung disease. There is a trend towards  hyperinflation/air trapping in comparison to the preceding study. Dulera 2 puffs bid and Spiriva daily stopped per insurance  Now on Incruse  Try him on Anoro if he has coverage for this      4. Cardiomyopathy ([address]  LVEF 30%  Followed by cardiology    5. Coronary artery disease involving native coronary artery of native heart with angina pectoris ([address]  Two stents at [facility] 1/17    6. Essential hypertension, benign  BP is up today      FOLLOW UP: 6 months      Chief Complaint   Patient presents with    Cough    Shortness of Breath    COPD       HPI  The patient presents with a chief complaint of shortness of breath and cough. He is having some green sputum. He had this same thing in April 17 and Doxy helped him. He was taking Dulera and Spiriva but lost insurance coverage for these medications.  He is now taking Incruse and Ventolin but finds that it is not as good as the previous medications. No Chest pain, Nausea or vomiting reported      Review of Systems  As documented in HPI     Physical Exam:  Well developed, well nourished  Alert and oriented  Sclera is clear  No cervical adenopathy  No JVD. Chest examination is clear. Cardiac examination reveals regular rate and rhythm without murmur, gallop or rub. The abdomen is soft, nontender and nondistended. There is no clubbing, cyanosis or edema of the extremities. There is no obvious skin rash. No focal neuro deficicts  Normal mood and affect    Allergies   Allergen Reactions    Lisinopril      COUGH     Prior to Visit Medications    Medication Sig Taking?  Authorizing Provider   losartan (COZAAR) 100 MG tablet TAKE 1 TABLET BY MOUTH DAILY Yes Shantelle Godinez NP   carvedilol (COREG) 25 MG tablet TAKE 1 TABLET BY MOUTH TWICE DAILY WITH MEALS Yes Shantelle Godinez NP   doxazosin (CARDURA) 4 MG tablet Take 1 tablet by mouth nightly Yes Shantelle Godinez NP   furosemide (LASIX) 40 MG tablet Take 1 tablet by mouth daily Yes Shantelle Godinez NP   ticagrelor (BRILINTA) 90 MG TABS tablet Take 1 tablet by mouth 2 times daily Yes Shantelle Godinez NP   potassium chloride (KLOR-CON M) 20 MEQ extended release tablet Take 1 tablet by mouth daily Yes Shantelle Godinez NP   nitroGLYCERIN (NITROSTAT) 0.4 MG SL tablet Place 1 tablet under the tongue every 5 minutes as needed for Chest pain Yes Shantelle Godinez NP   digoxin (LANOXIN) 125 MCG tablet Take 125 mcg by mouth daily 1/2 tab daily Yes Historical Provider, MD   albuterol sulfate HFA (PROVENTIL HFA) 108 (90 BASE) MCG/ACT inhaler Inhale 2 puffs into the lungs every 4 hours as needed for Wheezing Yes Eder Durán MD   DALIRESP 500 MCG tablet TAKE 1 TABLET BY MOUTH EVERY DAY Yes Christi Ortega CNP   aspirin 81 MG chewable tablet Take 81 mg by mouth daily Yes Historical Provider, MD   clonazePAM (KLONOPIN) 1 MG tablet Take 1 mg by mouth 2 times daily as needed for

## 2018-03-14 ENCOUNTER — TELEPHONE (OUTPATIENT)
Dept: PULMONOLOGY | Age: 68
End: 2018-03-14

## 2018-03-14 DIAGNOSIS — R05.3 CHRONIC COUGH: Primary | ICD-10-CM

## 2018-03-15 NOTE — TELEPHONE ENCOUNTER
Pt informed and stated he cannot come in tomorrow. Scheduled him for Friday at 12:00 with Dr Jacey Carey. Pt will obtain CXR prior to his appt.  Order placed for CXR

## 2018-03-15 NOTE — TELEPHONE ENCOUNTER
Pt called in today stating he is on his way to the ER he is in a lot of pain, he does not know if he will be able to make his appt on 3/16. Pt's wife will call back in the morning to give update on appt status.

## 2018-04-10 ENCOUNTER — OFFICE VISIT (OUTPATIENT)
Dept: PULMONOLOGY | Age: 68
End: 2018-04-10

## 2018-04-10 VITALS — SYSTOLIC BLOOD PRESSURE: 140 MMHG | DIASTOLIC BLOOD PRESSURE: 84 MMHG | HEART RATE: 52 BPM | OXYGEN SATURATION: 97 %

## 2018-04-10 DIAGNOSIS — J44.9 COPD, MODERATE (HCC): ICD-10-CM

## 2018-04-10 DIAGNOSIS — I10 ESSENTIAL HYPERTENSION, BENIGN: ICD-10-CM

## 2018-04-10 DIAGNOSIS — I25.5 ISCHEMIC CARDIOMYOPATHY: ICD-10-CM

## 2018-04-10 DIAGNOSIS — R06.02 SHORTNESS OF BREATH: Primary | ICD-10-CM

## 2018-04-10 DIAGNOSIS — R04.2 HEMOPTYSIS: ICD-10-CM

## 2018-04-10 DIAGNOSIS — I25.119 CORONARY ARTERY DISEASE INVOLVING NATIVE CORONARY ARTERY OF NATIVE HEART WITH ANGINA PECTORIS (HCC): ICD-10-CM

## 2018-04-10 DIAGNOSIS — R05.3 CHRONIC COUGH: ICD-10-CM

## 2018-04-10 PROCEDURE — G8598 ASA/ANTIPLAT THER USED: HCPCS | Performed by: INTERNAL MEDICINE

## 2018-04-10 PROCEDURE — 4040F PNEUMOC VAC/ADMIN/RCVD: CPT | Performed by: INTERNAL MEDICINE

## 2018-04-10 PROCEDURE — 1036F TOBACCO NON-USER: CPT | Performed by: INTERNAL MEDICINE

## 2018-04-10 PROCEDURE — 3017F COLORECTAL CA SCREEN DOC REV: CPT | Performed by: INTERNAL MEDICINE

## 2018-04-10 PROCEDURE — G8427 DOCREV CUR MEDS BY ELIG CLIN: HCPCS | Performed by: INTERNAL MEDICINE

## 2018-04-10 PROCEDURE — 3023F SPIROM DOC REV: CPT | Performed by: INTERNAL MEDICINE

## 2018-04-10 PROCEDURE — 99214 OFFICE O/P EST MOD 30 MIN: CPT | Performed by: INTERNAL MEDICINE

## 2018-04-10 PROCEDURE — 1123F ACP DISCUSS/DSCN MKR DOCD: CPT | Performed by: INTERNAL MEDICINE

## 2018-04-10 PROCEDURE — G8417 CALC BMI ABV UP PARAM F/U: HCPCS | Performed by: INTERNAL MEDICINE

## 2018-04-10 PROCEDURE — G8926 SPIRO NO PERF OR DOC: HCPCS | Performed by: INTERNAL MEDICINE

## 2018-04-13 ENCOUNTER — TELEPHONE (OUTPATIENT)
Dept: PULMONOLOGY | Age: 68
End: 2018-04-13

## 2018-04-16 ENCOUNTER — HOSPITAL ENCOUNTER (OUTPATIENT)
Dept: OTHER | Age: 68
Discharge: HOME | End: 2018-04-16
Attending: INTERNAL MEDICINE | Admitting: INTERNAL MEDICINE

## 2018-04-16 LAB
BUN BLDV-MCNC: 17 MG/DL (ref 7–20)
CREAT SERPL-MCNC: 0.6 MG/DL (ref 0.8–1.3)
GFR AFRICAN AMERICAN: >60
GFR NON-AFRICAN AMERICAN: >60

## 2018-04-17 ENCOUNTER — TELEPHONE (OUTPATIENT)
Dept: ENT CLINIC | Age: 68
End: 2018-04-17

## 2018-04-23 ENCOUNTER — TELEPHONE (OUTPATIENT)
Dept: PULMONOLOGY | Age: 68
End: 2018-04-23

## 2018-04-23 ENCOUNTER — HOSPITAL ENCOUNTER (OUTPATIENT)
Dept: CT IMAGING | Age: 68
Discharge: HOME | End: 2018-04-23
Attending: INTERNAL MEDICINE | Admitting: INTERNAL MEDICINE

## 2018-04-23 DIAGNOSIS — I10 ESSENTIAL (PRIMARY) HYPERTENSION: ICD-10-CM

## 2018-04-23 DIAGNOSIS — R04.2 HEMOPTYSIS: ICD-10-CM

## 2018-04-25 ENCOUNTER — HOSPITAL ENCOUNTER (OUTPATIENT)
Dept: ENDOSCOPY | Age: 68
Discharge: HOME | End: 2018-04-25
Attending: INTERNAL MEDICINE | Admitting: INTERNAL MEDICINE

## 2018-04-25 VITALS
SYSTOLIC BLOOD PRESSURE: 143 MMHG | DIASTOLIC BLOOD PRESSURE: 81 MMHG | RESPIRATION RATE: 20 BRPM | HEART RATE: 60 BPM | TEMPERATURE: 98.1 F | OXYGEN SATURATION: 96 %

## 2018-04-25 DIAGNOSIS — R04.2 HEMOPTYSIS: ICD-10-CM

## 2018-04-25 LAB
APTT: 29.4 SEC (ref 24.1–34.9)
BASOPHILS ABSOLUTE: 0.1 K/UL (ref 0–0.2)
BASOPHILS RELATIVE PERCENT: 0.7 %
EOSINOPHILS ABSOLUTE: 0.2 K/UL (ref 0–0.6)
EOSINOPHILS RELATIVE PERCENT: 2.9 %
GLUCOSE BLD-MCNC: 103 MG/DL (ref 70–99)
GLUCOSE BLD-MCNC: 105 MG/DL (ref 70–99)
HCT VFR BLD CALC: 33.1 % (ref 40.5–52.5)
HEMOGLOBIN: 10.8 G/DL (ref 13.5–17.5)
INR BLD: 1.1 (ref 0.85–1.15)
LYMPHOCYTES ABSOLUTE: 1.1 K/UL (ref 1–5.1)
LYMPHOCYTES RELATIVE PERCENT: 14.9 %
MCH RBC QN AUTO: 23.5 PG (ref 26–34)
MCHC RBC AUTO-ENTMCNC: 32.6 G/DL (ref 31–36)
MCV RBC AUTO: 72.1 FL (ref 80–100)
MONOCYTES ABSOLUTE: 0.5 K/UL (ref 0–1.3)
MONOCYTES RELATIVE PERCENT: 6.7 %
NEUTROPHILS ABSOLUTE: 5.7 K/UL (ref 1.7–7.7)
NEUTROPHILS RELATIVE PERCENT: 74.8 %
PDW BLD-RTO: 16.3 % (ref 12.4–15.4)
PERFORMED ON: ABNORMAL
PERFORMED ON: ABNORMAL
PLATELET # BLD: 180 K/UL (ref 135–450)
PMV BLD AUTO: 7.7 FL (ref 5–10.5)
PROTHROMBIN TIME: 12.4 SEC (ref 9.6–13)
RBC # BLD: 4.59 M/UL (ref 4.2–5.9)
WBC # BLD: 7.6 K/UL (ref 4–11)

## 2018-04-25 PROCEDURE — 31622 DX BRONCHOSCOPE/WASH: CPT | Performed by: INTERNAL MEDICINE

## 2018-04-25 RX ORDER — SODIUM CHLORIDE 0.9 % (FLUSH) 0.9 %
10 SYRINGE (ML) INJECTION EVERY 12 HOURS SCHEDULED
Status: DISCONTINUED | OUTPATIENT
Start: 2018-04-25 | End: 2018-04-26 | Stop reason: HOSPADM

## 2018-04-25 RX ORDER — SODIUM CHLORIDE 0.9 % (FLUSH) 0.9 %
10 SYRINGE (ML) INJECTION PRN
Status: DISCONTINUED | OUTPATIENT
Start: 2018-04-25 | End: 2018-04-26 | Stop reason: HOSPADM

## 2018-04-25 RX ORDER — GLYCOPYRROLATE 0.2 MG/ML
0.2 INJECTION INTRAMUSCULAR; INTRAVENOUS ONCE
Status: COMPLETED | OUTPATIENT
Start: 2018-04-25 | End: 2018-04-25

## 2018-04-25 RX ORDER — SODIUM CHLORIDE 9 MG/ML
INJECTION, SOLUTION INTRAVENOUS CONTINUOUS
Status: DISCONTINUED | OUTPATIENT
Start: 2018-04-25 | End: 2018-04-26 | Stop reason: HOSPADM

## 2018-04-25 RX ORDER — LIDOCAINE HYDROCHLORIDE 40 MG/ML
SOLUTION TOPICAL ONCE
Status: COMPLETED | OUTPATIENT
Start: 2018-04-25 | End: 2018-04-25

## 2018-04-25 RX ORDER — IPRATROPIUM BROMIDE AND ALBUTEROL SULFATE 2.5; .5 MG/3ML; MG/3ML
1 SOLUTION RESPIRATORY (INHALATION) ONCE
Status: COMPLETED | OUTPATIENT
Start: 2018-04-25 | End: 2018-04-25

## 2018-04-25 RX ADMIN — SODIUM CHLORIDE: 9 INJECTION, SOLUTION INTRAVENOUS at 07:28

## 2018-04-25 RX ADMIN — LIDOCAINE HYDROCHLORIDE: 40 SOLUTION TOPICAL at 07:15

## 2018-04-25 RX ADMIN — GLYCOPYRROLATE 0.2 MG: 0.2 INJECTION INTRAMUSCULAR; INTRAVENOUS at 07:04

## 2018-04-25 RX ADMIN — IPRATROPIUM BROMIDE AND ALBUTEROL SULFATE 1 AMPULE: 2.5; .5 SOLUTION RESPIRATORY (INHALATION) at 07:15

## 2018-04-25 ASSESSMENT — ENCOUNTER SYMPTOMS: SHORTNESS OF BREATH: 1

## 2018-04-25 ASSESSMENT — PAIN - FUNCTIONAL ASSESSMENT: PAIN_FUNCTIONAL_ASSESSMENT: 0-10

## 2018-06-22 RX ORDER — CARVEDILOL 25 MG/1
TABLET ORAL
Qty: 90 TABLET | Refills: 2 | Status: SHIPPED | OUTPATIENT
Start: 2018-06-22 | End: 2018-08-08

## 2018-06-22 RX ORDER — CARVEDILOL 25 MG/1
TABLET ORAL
Qty: 90 TABLET | Refills: 3 | Status: SHIPPED | OUTPATIENT
Start: 2018-06-22 | End: 2018-06-22 | Stop reason: SDUPTHER

## 2018-07-10 RX ORDER — DOXAZOSIN MESYLATE 4 MG/1
4 TABLET ORAL NIGHTLY
Qty: 90 TABLET | Refills: 1 | Status: SHIPPED | OUTPATIENT
Start: 2018-07-10 | End: 2019-01-02 | Stop reason: SDUPTHER

## 2018-07-11 ENCOUNTER — OFFICE VISIT (OUTPATIENT)
Dept: PULMONOLOGY | Age: 68
End: 2018-07-11

## 2018-07-11 VITALS
DIASTOLIC BLOOD PRESSURE: 81 MMHG | OXYGEN SATURATION: 96 % | BODY MASS INDEX: 36.82 KG/M2 | HEART RATE: 64 BPM | SYSTOLIC BLOOD PRESSURE: 132 MMHG | WEIGHT: 264 LBS

## 2018-07-11 DIAGNOSIS — R04.2 HEMOPTYSIS: ICD-10-CM

## 2018-07-11 DIAGNOSIS — I25.5 ISCHEMIC CARDIOMYOPATHY: ICD-10-CM

## 2018-07-11 DIAGNOSIS — J44.9 COPD, MODERATE (HCC): ICD-10-CM

## 2018-07-11 DIAGNOSIS — I10 ESSENTIAL HYPERTENSION, BENIGN: ICD-10-CM

## 2018-07-11 DIAGNOSIS — R06.02 SOB (SHORTNESS OF BREATH): Primary | ICD-10-CM

## 2018-07-11 PROCEDURE — 99214 OFFICE O/P EST MOD 30 MIN: CPT | Performed by: INTERNAL MEDICINE

## 2018-07-11 PROCEDURE — G8598 ASA/ANTIPLAT THER USED: HCPCS | Performed by: INTERNAL MEDICINE

## 2018-07-11 PROCEDURE — G8417 CALC BMI ABV UP PARAM F/U: HCPCS | Performed by: INTERNAL MEDICINE

## 2018-07-11 PROCEDURE — G8427 DOCREV CUR MEDS BY ELIG CLIN: HCPCS | Performed by: INTERNAL MEDICINE

## 2018-07-11 PROCEDURE — 1101F PT FALLS ASSESS-DOCD LE1/YR: CPT | Performed by: INTERNAL MEDICINE

## 2018-07-11 PROCEDURE — G8926 SPIRO NO PERF OR DOC: HCPCS | Performed by: INTERNAL MEDICINE

## 2018-07-11 PROCEDURE — 3017F COLORECTAL CA SCREEN DOC REV: CPT | Performed by: INTERNAL MEDICINE

## 2018-07-11 PROCEDURE — 3023F SPIROM DOC REV: CPT | Performed by: INTERNAL MEDICINE

## 2018-07-11 PROCEDURE — 1036F TOBACCO NON-USER: CPT | Performed by: INTERNAL MEDICINE

## 2018-07-11 PROCEDURE — 1123F ACP DISCUSS/DSCN MKR DOCD: CPT | Performed by: INTERNAL MEDICINE

## 2018-07-11 PROCEDURE — 4040F PNEUMOC VAC/ADMIN/RCVD: CPT | Performed by: INTERNAL MEDICINE

## 2018-07-11 RX ORDER — ALBUTEROL SULFATE 90 UG/1
2 AEROSOL, METERED RESPIRATORY (INHALATION) EVERY 4 HOURS PRN
Qty: 1 INHALER | Refills: 5 | Status: SHIPPED | OUTPATIENT
Start: 2018-07-11 | End: 2019-05-08 | Stop reason: SDUPTHER

## 2018-07-11 NOTE — PROGRESS NOTES
[facility]  Progress Note  MD Selma Wise   YOB: 1950    Date of Visit:  7/11/2018    Assessment/Plan:  1. Shortness of breath  Improving acute bronchitis. 2. Chronic cough/Hemoptysis  No more  hemoptysis. CT chest was negative  Bronch was also negative  Cancer runs his family and he is worried about this. .      3. COPD, moderate ([address]  PFT 12/14  Spirometry revealed decreased FVC at 3.43 L, which is 76% predicted. FEV1 is  decreased at 2.28 L, which is 63% predicted. FEV1/FVC ratio is reduced at  66%. There is no significant change after inhaled bronchodilators. Lung  volumes reveal normal total lung capacity. Vital capacity is mildly reduced. Residual volume is at the upper limits of normal. Diffusion capacity is  normal. In comparison to a study from 2012, spirometry is similar. The  total lung capacity is increased by 21% and residual volume is increased by  45%.       IMPRESSION: Moderate obstructive lung disease. There is a trend towards  hyperinflation/air trapping in comparison to the preceding study. Anoro  Albuterol  Explained that we would want to avoid long term Prednisone if possible. 4. Cardiomyopathy ([address]  LVEF 30%  Followed by cardiology    5. Coronary artery disease involving native coronary artery of native heart with angina pectoris ([address]  Two stents at [facility] 1/17    6. Essential hypertension, benign  BP is up today      FOLLOW UP: 6 months      Chief Complaint   Patient presents with    Follow-up     ended up here at [facility] ER on Monday after going to his local market and developed shortness of breath and broke out in a sweat. Brought her by squad. Discharged on Levaquin 500 mg x 10 days and Prednisone 60 mg a day x 5 days    Results     from his bronch done back in April HPI  The patient presents with a chief complaint of shortness of breath and cough.  He was in the ER 7/6/18 with a flare up and given Prednisone and Levaquin. HE is feeling better now. He likes the Prednisone and wanted to stay on it. No Chest pain, Nausea or vomiting reported    Review of Systems  As documented in HPI     Physical Exam:  Well developed, well nourished  Alert and oriented  Sclera is clear  No cervical adenopathy  No JVD. Chest examination is clear. Cardiac examination reveals regular rate and rhythm without murmur, gallop or rub. The abdomen is soft, nontender and nondistended. There is no clubbing, cyanosis or edema of the extremities. There is no obvious skin rash. No focal neuro deficicts  Normal mood and affect    Allergies   Allergen Reactions    Lisinopril Other (See Comments)     COUGH     Prior to Visit Medications    Medication Sig Taking?  Authorizing Provider   doxazosin (CARDURA) 4 MG tablet Take 1 tablet by mouth nightly  ALEXEI Canales CNP   predniSONE (DELTASONE) 10 MG tablet Take 6 tablets by mouth daily for 5 doses  Rachel Fall PA-C   levofloxacin (LEVAQUIN) 500 MG tablet Take 1 tablet by mouth daily for 10 days  Estela Panchal PA-C   carvedilol (COREG) 25 MG tablet TAKE 1 TABLET BY MOUTH TWICE DAILY WITH MEALS  ALEXEI Dela Cruz CNP   umeclidinium-vilanterol (ANORO ELLIPTA) 62.5-25 MCG/INH AEPB inhaler Inhale 1 puff into the lungs daily  Peter Mann MD   losartan (COZAAR) 100 MG tablet TAKE 1 TABLET BY MOUTH DAILY  ALEXEI Canales CNP   furosemide (LASIX) 40 MG tablet Take 1 tablet by mouth daily  ALEXEI Canales CNP   ticagrelor (BRILINTA) 90 MG TABS tablet Take 1 tablet by mouth 2 times daily  ALEXEI Canales CNP   potassium chloride (KLOR-CON M) 20 MEQ extended release tablet Take 1 tablet by mouth daily  ALEXEI Canales CNP   nitroGLYCERIN (NITROSTAT) 0.4 MG SL tablet Place 1 tablet under the tongue every 5 minutes as needed for Chest pain  ALEXEI Canales CNP   digoxin (LANOXIN) 125 MCG tablet Take 62.5 mcg by mouth daily   Historical Provider, MD   albuterol sulfate HFA (PROVENTIL HFA) 108 (90 BASE) MCG/ACT inhaler Inhale 2 puffs into the lungs every 4 hours as needed for Wheezing  Luzma Rainey MD   aspirin 81 MG chewable tablet Take 81 mg by mouth daily  Historical Provider, MD   clonazePAM (KLONOPIN) 1 MG tablet Take 1 mg by mouth 2 times daily as needed for Anxiety  Historical Provider, MD   metFORMIN (GLUCOPHAGE) 500 MG tablet Take 500 mg by mouth 2 times daily (with meals)  Historical Provider, MD   simvastatin (ZOCOR) 40 MG tablet Take 0.5 tablets by mouth nightly  ALEXEI Moya - CNP       Vitals:    07/11/18 1409   BP: 132/81   Pulse: 64   SpO2: 96%   Weight: 264 lb (119.7 kg)     Body mass index is 36.82 kg/m².      Wt Readings from Last 3 Encounters:   07/11/18 264 lb (119.7 kg)   07/06/18 265 lb (120.2 kg)   04/20/18 261 lb (118.4 kg)     BP Readings from Last 3 Encounters:   07/11/18 132/81   07/06/18 (!) 154/79   04/25/18 (!) 143/81        History   Smoking Status    Former Smoker    Packs/day: 0.25    Years: 25.00    Types: Cigarettes, Pipe, Cigars    Quit date: 1/1/2011   Smokeless Tobacco    Never Used     Comment: more than 5 cigars per day

## 2018-07-11 NOTE — LETTER
Pulmonary, Critical Care [facility] Sleep  [address] [facility], [address] [facility] [address]  Phone: [phone]  Fax: [phone]        July 11, 2018       Patient: Stephon Acevedo   MR Number: [id number]   YOB: 1950   Date of Visit: 7/11/2018       Dear Dr. Jose Drummond: Thank you for the request for consultation for Charissa Hanna. Below are the relevant portions of my assessment and plan of care. If you have questions, please do not hesitate to call me. I look forward to following Media Pacini along with you.     Sincerely,        Josephine Willis MD

## 2018-07-25 ENCOUNTER — OFFICE VISIT (OUTPATIENT)
Dept: CARDIOLOGY CLINIC | Age: 68
End: 2018-07-25

## 2018-07-25 VITALS
SYSTOLIC BLOOD PRESSURE: 126 MMHG | HEART RATE: 60 BPM | BODY MASS INDEX: 36.96 KG/M2 | HEIGHT: 71 IN | DIASTOLIC BLOOD PRESSURE: 78 MMHG | WEIGHT: 264 LBS

## 2018-07-25 DIAGNOSIS — I10 ESSENTIAL HYPERTENSION, BENIGN: ICD-10-CM

## 2018-07-25 DIAGNOSIS — R06.02 SOB (SHORTNESS OF BREATH): ICD-10-CM

## 2018-07-25 DIAGNOSIS — R07.89 CHEST PRESSURE: Primary | ICD-10-CM

## 2018-07-25 PROCEDURE — 93000 ELECTROCARDIOGRAM COMPLETE: CPT | Performed by: NURSE PRACTITIONER

## 2018-07-25 PROCEDURE — 1101F PT FALLS ASSESS-DOCD LE1/YR: CPT | Performed by: NURSE PRACTITIONER

## 2018-07-25 PROCEDURE — 99214 OFFICE O/P EST MOD 30 MIN: CPT | Performed by: NURSE PRACTITIONER

## 2018-07-25 PROCEDURE — 1123F ACP DISCUSS/DSCN MKR DOCD: CPT | Performed by: NURSE PRACTITIONER

## 2018-07-25 PROCEDURE — G8427 DOCREV CUR MEDS BY ELIG CLIN: HCPCS | Performed by: NURSE PRACTITIONER

## 2018-07-25 PROCEDURE — G8417 CALC BMI ABV UP PARAM F/U: HCPCS | Performed by: NURSE PRACTITIONER

## 2018-07-25 PROCEDURE — 4040F PNEUMOC VAC/ADMIN/RCVD: CPT | Performed by: NURSE PRACTITIONER

## 2018-07-25 PROCEDURE — 3017F COLORECTAL CA SCREEN DOC REV: CPT | Performed by: NURSE PRACTITIONER

## 2018-07-25 PROCEDURE — 1036F TOBACCO NON-USER: CPT | Performed by: NURSE PRACTITIONER

## 2018-07-25 PROCEDURE — G8598 ASA/ANTIPLAT THER USED: HCPCS | Performed by: NURSE PRACTITIONER

## 2018-07-25 NOTE — LETTER
Smokeless Tobacco    Never Used     Comment: more than 5 cigars per day     Current Medications:  Current Outpatient Prescriptions   Medication Sig Dispense Refill    albuterol sulfate HFA (PROVENTIL HFA) 108 (90 Base) MCG/ACT inhaler Inhale 2 puffs into the lungs every 4 hours as needed for Wheezing 1 Inhaler 5    doxazosin (CARDURA) 4 MG tablet Take 1 tablet by mouth nightly 90 tablet 1    carvedilol (COREG) 25 MG tablet TAKE 1 TABLET BY MOUTH TWICE DAILY WITH MEALS 90 tablet 2    umeclidinium-vilanterol (ANORO ELLIPTA) 62.5-25 MCG/INH AEPB inhaler Inhale 1 puff into the lungs daily 1 each 11    losartan (COZAAR) 100 MG tablet TAKE 1 TABLET BY MOUTH DAILY 30 tablet 11    furosemide (LASIX) 40 MG tablet Take 1 tablet by mouth daily 90 tablet 3    ticagrelor (BRILINTA) 90 MG TABS tablet Take 1 tablet by mouth 2 times daily 180 tablet 3    potassium chloride (KLOR-CON M) 20 MEQ extended release tablet Take 1 tablet by mouth daily 90 tablet 3    digoxin (LANOXIN) 125 MCG tablet Take 62.5 mcg by mouth daily       aspirin 81 MG chewable tablet Take 81 mg by mouth daily      clonazePAM (KLONOPIN) 1 MG tablet Take 1 mg by mouth 2 times daily as needed for Anxiety      metFORMIN (GLUCOPHAGE) 500 MG tablet Take 500 mg by mouth 2 times daily (with meals)      simvastatin (ZOCOR) 40 MG tablet Take 0.5 tablets by mouth nightly 30 tablet 3    nitroGLYCERIN (NITROSTAT) 0.4 MG SL tablet Place 1 tablet under the tongue every 5 minutes as needed for Chest pain 25 tablet 1     No current facility-administered medications for this visit. REVIEW OF SYSTEMS:    CONSTITUTIONAL: No major weight gain or loss, fatigue, weakness, night sweats treated with clonidine   HEENT: No new vision difficulties or ringing in the ears. RESPIRATORY: No new SOB, PND, orthopnea ,  - cough. CARDIOVASCULAR: See HPI  GI: No nausea, vomiting, diarrhea, constipation, abdominal pain or changes in bowel habits. Component Value Date    WBC 8.4 07/06/2018    HGB 11.2 (L) 07/06/2018    HCT 35.2 (L) 07/06/2018    MCV 71.0 (L) 07/06/2018     07/06/2018     No components found for: CHLPL  Lab Results   Component Value Date    TRIG 89 09/20/2017    TRIG 51 08/07/2015    TRIG 64 04/16/2012     Lab Results   Component Value Date    HDL 35 (A) 09/20/2017    HDL 33 (A) 08/07/2015    HDL 31 (L) 04/16/2012     Lab Results   Component Value Date    LDLCALC 60 09/20/2017    LDLCALC 72 08/07/2015    LDLCALC 82 04/16/2012     Lab Results   Component Value Date    LABVLDL 18 09/20/2017    LABVLDL 10 08/07/2015    LABVLDL 13 04/16/2012     Radiology Review:  Pertinent images / reports were reviewed as a part of this visit and reveals the following:  3/16 lexiscan   There is an inferior fixed defect consistent with diaphragmatic attenuation. There is no evidence of stress induced ischemia. Normal LV function. ECHO [facility] 1/17  EF 25-30%       Cardiac cath: '04: 30% origin LAD, 30-40% mid-LAD    CATH 1/17_ single vessel disease 100% prox LAD, overlapping synergy stents to prox LAD, successful POBA of distal LAD with balloon, LV 30-35%    Assessment:     1. HTN (hypertension) stable    2. Cardiomyopathy-since anterior wall MI 1/17   no signs of heart failure, EF on recent echo 50%          3. Anterior wall MI- overlapping stents to LAD 1/17 no chest pain, on brilenta, will do GXT at next OV  4. Hyperlipidemia- 1/17  TC-103, Trig-71, HDL-335, LDL-54 on zocor    9/17  TC-113, Trig-89, HDL-35, LDL 60    Plan:   No change in meds  FU 6 months    The patient is not currently smoking. The risks related to smoking were reviewed with the patient. Recommend maintaining a smoke-free lifestyle. Daily weight, low sodium diet were discussed.  Patient instructed to call the office with a weight gain: > 3 # over night or 5# in one week; swelling, SOB/orthopnea/PND        Tobacco Cessation Counseling-Y/  Blood Pressure Retake-Y

## 2018-07-25 NOTE — PROGRESS NOTES
Inhale 1 puff into the lungs daily 1 each 11    losartan (COZAAR) 100 MG tablet TAKE 1 TABLET BY MOUTH DAILY 30 tablet 11    furosemide (LASIX) 40 MG tablet Take 1 tablet by mouth daily 90 tablet 3    ticagrelor (BRILINTA) 90 MG TABS tablet Take 1 tablet by mouth 2 times daily 180 tablet 3    potassium chloride (KLOR-CON M) 20 MEQ extended release tablet Take 1 tablet by mouth daily 90 tablet 3    digoxin (LANOXIN) 125 MCG tablet Take 62.5 mcg by mouth daily       aspirin 81 MG chewable tablet Take 81 mg by mouth daily      clonazePAM (KLONOPIN) 1 MG tablet Take 1 mg by mouth 2 times daily as needed for Anxiety      metFORMIN (GLUCOPHAGE) 500 MG tablet Take 500 mg by mouth 2 times daily (with meals)      simvastatin (ZOCOR) 40 MG tablet Take 0.5 tablets by mouth nightly 30 tablet 3    nitroGLYCERIN (NITROSTAT) 0.4 MG SL tablet Place 1 tablet under the tongue every 5 minutes as needed for Chest pain 25 tablet 1     No current facility-administered medications for this visit. REVIEW OF SYSTEMS:    CONSTITUTIONAL: No major weight gain or loss, fatigue, weakness, night sweats treated with clonidine   HEENT: No new vision difficulties or ringing in the ears. RESPIRATORY: No new SOB, PND, orthopnea ,  - cough. CARDIOVASCULAR: See HPI  GI: No nausea, vomiting, diarrhea, constipation, abdominal pain or changes in bowel habits. : No urinary frequency, urgency, incontinence hematuria or dysuria. SKIN: No cyanosis or skin lesions. MUSCULOSKELETAL: No new muscle or joint pain. NEUROLOGICAL: No syncope or TIA-like symptoms.   PSYCHIATRIC: No anxiety, pain, insomnia or depression    Objective:   PHYSICAL EXAM:        Vitals:    07/25/18 1007   BP: 126/78   Site: Left Arm   Position: Sitting   Cuff Size: Large Adult   Pulse: 60   Weight: 264 lb (119.7 kg)   Height: 5' 11\" (1.803 m)       VITALS:    /78 (Site: Left Arm, Position: Sitting, Cuff Size: Large Adult)   Pulse 60   Ht 5' 11\" (1.803 m)   Wt 264 lb (119.7 kg)   BMI 36.82 kg/m²   CONSTITUTIONAL: Cooperative, no apparent distress, and appears well nourished / developed  NEUROLOGIC:  Awake and orientated to person, place and time. PSYCH: Calm affect. SKIN: Warm and dry. HEENT: Sclera non-icteric, normocephalic, neck supple, no elevation of JVP, normal carotid pulses with no bruits and thyroid normal size. LUNGS:  No increased work of breathing and clear to auscultation, no crackles or wheezing  CARDIOVASCULAR:  Regular rhythm with no murmurs, gallops, rubs, or abnormal heart sounds, normal PMI. The apical impulses not displaced  JVP less than 8 cm H2O  Heart tones are crisp and normal  Cervical veins are not engorged  The carotid upstroke is normal in amplitude and contour without delay or bruit  JVP is not elevated  ABDOMEN:  Normal bowel sounds, non-distended and non-tender to palpation  EXT: No edema, no calf tenderness. Pulses are present bilaterally.      DATA:    Lab Results   Component Value Date    ALT 13 07/06/2018    AST 16 07/06/2018    ALKPHOS 101 07/06/2018    BILITOT 0.7 07/06/2018     Lab Results   Component Value Date    CREATININE 0.8 07/06/2018    BUN 34 (H) 07/06/2018     07/06/2018    K 3.5 07/06/2018     07/06/2018    CO2 24 07/06/2018     Lab Results   Component Value Date    TSH 2.40 08/02/2012    Z3GXZHL 8.8 09/28/2011     Lab Results   Component Value Date    WBC 8.4 07/06/2018    HGB 11.2 (L) 07/06/2018    HCT 35.2 (L) 07/06/2018    MCV 71.0 (L) 07/06/2018     07/06/2018     No components found for: CHLPL  Lab Results   Component Value Date    TRIG 89 09/20/2017    TRIG 51 08/07/2015    TRIG 64 04/16/2012     Lab Results   Component Value Date    HDL 35 (A) 09/20/2017    HDL 33 (A) 08/07/2015    HDL 31 (L) 04/16/2012     Lab Results   Component Value Date    LDLCALC 60 09/20/2017    LDLCALC 72 08/07/2015    LDLCALC 82 04/16/2012     Lab Results   Component Value Date    LABVLDL 18 09/20/2017    LABVLDL 10

## 2018-07-25 NOTE — COMMUNICATION BODY
[facility]     Outpatient Follow Up Note    Kristal Cope is 80 y.o. male who presents today for FU. He woke up with chest pain 1/11/17, had acute anterior wall  MI/STEMI, taken directly to cath lab had cardiogenic shock, had PCI to LAD. Echo showed decrease in EF to  30-35%. He had recent echo and EF is now 50%. He has not had any admissions for heart failure. He has history of HTN, LVH and hyperlipidemia. Subjective: The patient does not have swelling. The patient is not experiencing palpitations or dizziness. He has not had any chest pain, SOB, no palpitations, edema. He is stressed with issues going on at home. NO issues with brilenta.   He is able to do all his chores at home without difficulty      Past Medical History:   Diagnosis Date    [facility] obstructive [facility] disease) [facility] [address]     Coronary artery disease involving native coronary artery of native heart with angina pectoris ([facility] [address] 1/18/2017    Depression     Diabetes mellitus ([facility] [address]     type II    Dysphagia     Enlarged prostate     Heart enlarged     Hyperlipidemia     Hypertension     Obesity     Pneumonia     multiple times 2018    Sleep apnea     has cpap but hasn't used \"in a long time\"     Social History:    History   Smoking Status    Former Smoker    Packs/day: 0.25    Years: 25.00    Types: Cigarettes, Pipe, Cigars    Quit date: 1/1/2011   Smokeless Tobacco    Never Used     Comment: more than 5 cigars per day     Current Medications:  Current Outpatient Prescriptions   Medication Sig Dispense Refill    albuterol sulfate HFA (PROVENTIL HFA) 108 (90 Base) MCG/ACT inhaler Inhale 2 puffs into the lungs every 4 hours as needed for Wheezing 1 Inhaler 5    doxazosin (CARDURA) 4 MG tablet Take 1 tablet by mouth nightly 90 tablet 1    carvedilol (COREG) 25 MG tablet TAKE 1 TABLET BY MOUTH TWICE DAILY WITH MEALS 90 tablet 2    umeclidinium-vilanterol (ANORO ELLIPTA) 62.5-25 MCG/INH AEPB inhaler 08/07/2015    LABVLDL 13 04/16/2012     Radiology Review:  Pertinent images / reports were reviewed as a part of this visit and reveals the following:  3/16 lexiscan   There is an inferior fixed defect consistent with diaphragmatic attenuation. There is no evidence of stress induced ischemia. Normal LV function. ECHO [facility] 1/17  EF 25-30%       Cardiac cath: '04: 30% origin LAD, 30-40% mid-LAD    CATH 1/17_ single vessel disease 100% prox LAD, overlapping synergy stents to prox LAD, successful POBA of distal LAD with balloon, LV 30-35%    Assessment:     1. HTN (hypertension) stable    2. Cardiomyopathy-since anterior wall MI 1/17   no signs of heart failure, EF on recent echo 50%          3. Anterior wall MI- overlapping stents to LAD 1/17 no chest pain, on brilenta, will do GXT at next OV  4. Hyperlipidemia- 1/17  TC-103, Trig-71, HDL-335, LDL-54 on zocor    9/17  TC-113, Trig-89, HDL-35, LDL 60    Plan:   No change in meds  FU 6 months    The patient is not currently smoking. The risks related to smoking were reviewed with the patient. Recommend maintaining a smoke-free lifestyle. Daily weight, low sodium diet were discussed. Patient instructed to call the office with a weight gain: > 3 # over night or 5# in one week; swelling, SOB/orthopnea/PND        Tobacco Cessation Counseling-Y/  Blood Pressure Retake-Y  Documentation of Communication with PCP-Y/  CAD Antiplatelet Therapy-Y/  CAD Lipid Lowering Therapy-Y/  HF-A Fib Warfarin Therapy-/N    CONCHA Joseph CNP  [address]    Thank you for allowing to us to participate in the care of Minesh Chauhan.             2020 [facility]

## 2018-08-08 RX ORDER — METOPROLOL SUCCINATE 50 MG/1
50 TABLET, EXTENDED RELEASE ORAL DAILY
Qty: 30 TABLET | Refills: 3
Start: 2018-08-08 | End: 2018-10-10 | Stop reason: SDUPTHER

## 2018-08-14 ENCOUNTER — TELEPHONE (OUTPATIENT)
Dept: PULMONOLOGY | Age: 68
End: 2018-08-14

## 2018-08-14 RX ORDER — LOSARTAN POTASSIUM 100 MG/1
100 TABLET ORAL DAILY
Qty: 90 TABLET | Refills: 3 | Status: SHIPPED | OUTPATIENT
Start: 2018-08-14 | End: 2019-05-31 | Stop reason: SDUPTHER

## 2018-08-15 ENCOUNTER — TELEPHONE (OUTPATIENT)
Dept: CARDIOLOGY CLINIC | Age: 68
End: 2018-08-15

## 2018-08-16 ENCOUNTER — HOSPITAL ENCOUNTER (OUTPATIENT)
Dept: OTHER | Age: 68
Discharge: HOME | End: 2018-08-16
Attending: NURSE PRACTITIONER | Admitting: NURSE PRACTITIONER

## 2018-08-16 ENCOUNTER — OFFICE VISIT (OUTPATIENT)
Dept: PULMONOLOGY | Age: 68
End: 2018-08-16

## 2018-08-16 VITALS
HEART RATE: 60 BPM | BODY MASS INDEX: 36.26 KG/M2 | WEIGHT: 260 LBS | OXYGEN SATURATION: 96 % | DIASTOLIC BLOOD PRESSURE: 81 MMHG | SYSTOLIC BLOOD PRESSURE: 124 MMHG

## 2018-08-16 DIAGNOSIS — I10 ESSENTIAL HYPERTENSION: ICD-10-CM

## 2018-08-16 DIAGNOSIS — J20.9 ACUTE BRONCHITIS, UNSPECIFIED ORGANISM: ICD-10-CM

## 2018-08-16 DIAGNOSIS — J44.1 ACUTE EXACERBATION OF CHRONIC OBSTRUCTIVE PULMONARY DISEASE (HCC): Primary | ICD-10-CM

## 2018-08-16 DIAGNOSIS — G47.33 OSA (OBSTRUCTIVE SLEEP APNEA): ICD-10-CM

## 2018-08-16 LAB
ANION GAP SERPL CALCULATED.3IONS-SCNC: 13 MMOL/L (ref 3–16)
BASOPHILS ABSOLUTE: 0.1 K/UL (ref 0–0.2)
BASOPHILS RELATIVE PERCENT: 1.1 %
BUN BLDV-MCNC: 16 MG/DL (ref 7–20)
CALCIUM SERPL-MCNC: 8.7 MG/DL (ref 8.3–10.6)
CHLORIDE BLD-SCNC: 102 MMOL/L (ref 99–110)
CO2: 23 MMOL/L (ref 21–32)
CREAT SERPL-MCNC: 0.9 MG/DL (ref 0.8–1.3)
EOSINOPHILS ABSOLUTE: 0.2 K/UL (ref 0–0.6)
EOSINOPHILS RELATIVE PERCENT: 2.5 %
GFR AFRICAN AMERICAN: >60
GFR NON-AFRICAN AMERICAN: >60
GLUCOSE BLD-MCNC: 112 MG/DL (ref 70–99)
HCT VFR BLD CALC: 35.3 % (ref 40.5–52.5)
HEMOGLOBIN: 11.6 G/DL (ref 13.5–17.5)
LYMPHOCYTES ABSOLUTE: 0.9 K/UL (ref 1–5.1)
LYMPHOCYTES RELATIVE PERCENT: 12.7 %
MCH RBC QN AUTO: 23 PG (ref 26–34)
MCHC RBC AUTO-ENTMCNC: 32.8 G/DL (ref 31–36)
MCV RBC AUTO: 70.2 FL (ref 80–100)
MONOCYTES ABSOLUTE: 0.4 K/UL (ref 0–1.3)
MONOCYTES RELATIVE PERCENT: 5.8 %
NEUTROPHILS ABSOLUTE: 5.7 K/UL (ref 1.7–7.7)
NEUTROPHILS RELATIVE PERCENT: 77.9 %
PDW BLD-RTO: 17 % (ref 12.4–15.4)
PLATELET # BLD: 190 K/UL (ref 135–450)
PMV BLD AUTO: 8.7 FL (ref 5–10.5)
POTASSIUM SERPL-SCNC: 3.7 MMOL/L (ref 3.5–5.1)
PROCALCITONIN: 0.05 NG/ML (ref 0–0.15)
RBC # BLD: 5.03 M/UL (ref 4.2–5.9)
SODIUM BLD-SCNC: 138 MMOL/L (ref 136–145)
WBC # BLD: 7.3 K/UL (ref 4–11)

## 2018-08-16 PROCEDURE — 99214 OFFICE O/P EST MOD 30 MIN: CPT | Performed by: NURSE PRACTITIONER

## 2018-08-16 PROCEDURE — 3017F COLORECTAL CA SCREEN DOC REV: CPT | Performed by: NURSE PRACTITIONER

## 2018-08-16 PROCEDURE — 3023F SPIROM DOC REV: CPT | Performed by: NURSE PRACTITIONER

## 2018-08-16 PROCEDURE — 1101F PT FALLS ASSESS-DOCD LE1/YR: CPT | Performed by: NURSE PRACTITIONER

## 2018-08-16 PROCEDURE — G8417 CALC BMI ABV UP PARAM F/U: HCPCS | Performed by: NURSE PRACTITIONER

## 2018-08-16 PROCEDURE — 1036F TOBACCO NON-USER: CPT | Performed by: NURSE PRACTITIONER

## 2018-08-16 PROCEDURE — G8427 DOCREV CUR MEDS BY ELIG CLIN: HCPCS | Performed by: NURSE PRACTITIONER

## 2018-08-16 PROCEDURE — G8926 SPIRO NO PERF OR DOC: HCPCS | Performed by: NURSE PRACTITIONER

## 2018-08-16 PROCEDURE — G8598 ASA/ANTIPLAT THER USED: HCPCS | Performed by: NURSE PRACTITIONER

## 2018-08-16 PROCEDURE — 1123F ACP DISCUSS/DSCN MKR DOCD: CPT | Performed by: NURSE PRACTITIONER

## 2018-08-16 PROCEDURE — 4040F PNEUMOC VAC/ADMIN/RCVD: CPT | Performed by: NURSE PRACTITIONER

## 2018-08-16 RX ORDER — DOXYCYCLINE HYCLATE 100 MG
100 TABLET ORAL 2 TIMES DAILY
Qty: 20 TABLET | Refills: 0 | Status: SHIPPED | OUTPATIENT
Start: 2018-08-16 | End: 2018-08-26

## 2018-08-16 RX ORDER — PREDNISONE 10 MG/1
TABLET ORAL
Qty: 30 TABLET | Refills: 0 | Status: SHIPPED | OUTPATIENT
Start: 2018-08-16 | End: 2018-08-18

## 2018-08-16 ASSESSMENT — ENCOUNTER SYMPTOMS
SHORTNESS OF BREATH: 1
COLOR CHANGE: 0
TROUBLE SWALLOWING: 0
DIARRHEA: 0
SINUS PRESSURE: 0
VOMITING: 0
COUGH: 1
ABDOMINAL PAIN: 0

## 2018-08-16 NOTE — PROGRESS NOTES
Procalcitonin is low. Doubt he has significant bacterial infection. I would get antibiotics filled but hold on taking them. He can use them in a few days if he is no better.     Kishan Rodgers MSN APRN-ACNP CCRN

## 2018-08-16 NOTE — PROGRESS NOTES
place, and time. He appears well-developed and well-nourished. No distress. HENT:   Head: Normocephalic. Mouth/Throat: No oropharyngeal exudate. Eyes: Pupils are equal, round, and reactive to light. Conjunctivae are normal.   Neck: Normal range of motion. Neck supple. No tracheal deviation present. Cardiovascular: Normal rate, regular rhythm and intact distal pulses. Exam reveals no friction rub. Pulmonary/Chest: Effort normal. No accessory muscle usage or stridor. No tachypnea. No respiratory distress. He has decreased breath sounds. He has no wheezes. He has no rales. He exhibits no tenderness and no crepitus. Abdominal: Soft. Bowel sounds are normal. He exhibits no distension. There is no tenderness. Musculoskeletal: Normal range of motion. He exhibits no edema. Lymphadenopathy:     He has no cervical adenopathy. Neurological: He is alert and oriented to person, place, and time. Skin: Skin is warm and dry. No erythema. Psychiatric: He has a normal mood and affect. Thought content normal.   Vitals reviewed. DATA:      Radiology Review:  Pertinent images / reports were reviewed as a part of this visit. CT chest done 4/18/18 reveals the following:  Negative for acute pulmonary embolism. Nonspecific lingular airspace opacities which could reflect infection (pneumonia versus aspiration) or inflammatory process/hemorrhage. Secretions in the central airways and basilar prominent airway inflammation favor aspiration. Last PFTs done Dec 2014:  Spirometry revealed decreased FVC at 3.43 L, which is 76% predicted. FEV1 is  decreased at 2.28 L, which is  63% predicted. FEV1/FVC ratio is reduced at  66%. There is no significant change after inhaled bronchodilators. Lung  volumes reveal normal total lung capacity. Vital capacity is mildly reduced.    Residual volume is at the upper limits of normal.  Diffusion capacity is  normal.  In comparison to a study from 2012, spirometry is

## 2018-08-20 LAB
CULTURE, RESPIRATORY: ABNORMAL
CULTURE, RESPIRATORY: ABNORMAL
GRAM STAIN RESULT: ABNORMAL
ORGANISM: ABNORMAL

## 2018-08-20 RX ORDER — POTASSIUM CHLORIDE 20 MEQ/1
20 TABLET, EXTENDED RELEASE ORAL DAILY
Qty: 90 TABLET | Refills: 3 | Status: SHIPPED | OUTPATIENT
Start: 2018-08-20 | End: 2018-11-28

## 2018-08-23 ENCOUNTER — HOSPITAL ENCOUNTER (OUTPATIENT)
Dept: OTHER | Age: 68
Discharge: HOME | End: 2018-08-23
Attending: NURSE PRACTITIONER | Admitting: NURSE PRACTITIONER

## 2018-08-27 ENCOUNTER — OFFICE VISIT (OUTPATIENT)
Dept: CARDIOLOGY CLINIC | Age: 68
End: 2018-08-27

## 2018-08-27 VITALS
DIASTOLIC BLOOD PRESSURE: 70 MMHG | HEART RATE: 64 BPM | HEIGHT: 71 IN | SYSTOLIC BLOOD PRESSURE: 110 MMHG | BODY MASS INDEX: 36.26 KG/M2 | WEIGHT: 259 LBS

## 2018-08-27 DIAGNOSIS — I25.5 ISCHEMIC CARDIOMYOPATHY: ICD-10-CM

## 2018-08-27 DIAGNOSIS — I25.119 CORONARY ARTERY DISEASE INVOLVING NATIVE CORONARY ARTERY OF NATIVE HEART WITH ANGINA PECTORIS (HCC): Primary | ICD-10-CM

## 2018-08-27 DIAGNOSIS — E78.2 MIXED HYPERLIPIDEMIA: ICD-10-CM

## 2018-08-27 DIAGNOSIS — I10 ESSENTIAL HYPERTENSION, BENIGN: ICD-10-CM

## 2018-08-27 PROCEDURE — 1036F TOBACCO NON-USER: CPT | Performed by: NURSE PRACTITIONER

## 2018-08-27 PROCEDURE — 1101F PT FALLS ASSESS-DOCD LE1/YR: CPT | Performed by: NURSE PRACTITIONER

## 2018-08-27 PROCEDURE — 4040F PNEUMOC VAC/ADMIN/RCVD: CPT | Performed by: NURSE PRACTITIONER

## 2018-08-27 PROCEDURE — G8598 ASA/ANTIPLAT THER USED: HCPCS | Performed by: NURSE PRACTITIONER

## 2018-08-27 PROCEDURE — G8427 DOCREV CUR MEDS BY ELIG CLIN: HCPCS | Performed by: NURSE PRACTITIONER

## 2018-08-27 PROCEDURE — G8417 CALC BMI ABV UP PARAM F/U: HCPCS | Performed by: NURSE PRACTITIONER

## 2018-08-27 PROCEDURE — 1123F ACP DISCUSS/DSCN MKR DOCD: CPT | Performed by: NURSE PRACTITIONER

## 2018-08-27 PROCEDURE — 99214 OFFICE O/P EST MOD 30 MIN: CPT | Performed by: NURSE PRACTITIONER

## 2018-08-27 PROCEDURE — 3017F COLORECTAL CA SCREEN DOC REV: CPT | Performed by: NURSE PRACTITIONER

## 2018-08-27 NOTE — PROGRESS NOTES
[address]     Outpatient Follow Up Note    CHIEF COMPLAINT / HPI:  Follow Up secondary to Hypertension/ CAD/ cardiomyopathy/ and hyperlipidemia       Onofre Jennings is 80 y.o. male who presents today for a routine follow up  related to the above mentioned issues. Subjective:   Since the time of last office visit, the patient admits their symptoms have changed. Hypertension/ CAD/ cardiomyopathy/ and hyperlipidemia    At today's office visit patient is being seen for a follow up visit. Patient was seen in the ER on 8/18/18, cardiac enzymes and EKG -negative results. Patient complains of intermittently  left sided chest pain. Currently he denies any chest pain, SOB, palpitations, dizziness, or edema. With regard to medication therapy the patient has been compliant with prescribed regimen. They have tolerated therapy to date.      Past Medical History:   Diagnosis Date    [facility] obstructive [facility] disease) ([facility] [address]     Coronary artery disease involving native coronary artery of native heart with angina pectoris ([facility] [address] 1/18/2017    Depression     Diabetes mellitus ([facility] [address]     type II    Dysphagia     Enlarged prostate     Heart enlarged     Hyperlipidemia     Hypertension     MRSA (methicillin resistant staph aureus) culture positive 08/17/2018    sputum    Obesity     Pneumonia     multiple times 2018    Sleep apnea     has cpap but hasn't used \"in a long time\"     Social History:    History   Smoking Status    Former Smoker    Packs/day: 0.25    Years: 25.00    Types: Cigarettes, Pipe, Cigars    Quit date: 1/1/2011   Smokeless Tobacco    Former User     Comment: more than 5 cigars per day     Current Medications:  Current Outpatient Prescriptions   Medication Sig Dispense Refill    potassium chloride (KLOR-CON M) 20 MEQ extended release tablet Take 1 tablet by mouth daily 90 tablet 3    doxycycline hyclate (VIBRA-TABS) 100 MG tablet Take 1 tablet by mouth 2 times daily for 10 days 20 tablet 0    losartan (COZAAR) 100 MG tablet Take 1 tablet by mouth daily 90 tablet 3    metoprolol succinate (TOPROL XL) 50 MG extended release tablet Take 1 tablet by mouth daily 30 tablet 3    albuterol sulfate HFA (PROVENTIL HFA) 108 (90 Base) MCG/ACT inhaler Inhale 2 puffs into the lungs every 4 hours as needed for Wheezing 1 Inhaler 5    doxazosin (CARDURA) 4 MG tablet Take 1 tablet by mouth nightly 90 tablet 1    umeclidinium-vilanterol (ANORO ELLIPTA) 62.5-25 MCG/INH AEPB inhaler Inhale 1 puff into the lungs daily 1 each 11    furosemide (LASIX) 40 MG tablet Take 1 tablet by mouth daily 90 tablet 3    ticagrelor (BRILINTA) 90 MG TABS tablet Take 1 tablet by mouth 2 times daily 180 tablet 3    nitroGLYCERIN (NITROSTAT) 0.4 MG SL tablet Place 1 tablet under the tongue every 5 minutes as needed for Chest pain 25 tablet 1    digoxin (LANOXIN) 125 MCG tablet Take 62.5 mcg by mouth daily       aspirin 81 MG chewable tablet Take 81 mg by mouth daily      clonazePAM (KLONOPIN) 1 MG tablet Take 1 mg by mouth 2 times daily as needed for Anxiety      metFORMIN (GLUCOPHAGE) 500 MG tablet Take 500 mg by mouth 2 times daily (with meals)      simvastatin (ZOCOR) 40 MG tablet Take 0.5 tablets by mouth nightly 30 tablet 3     No current facility-administered medications for this visit. REVIEW OF SYSTEMS:   CONSTITUTIONAL: No major weight gain or loss, fatigue, weakness, night sweats or fever. There's been no change in energy level, sleep pattern, or activity level. HEENT: No new vision difficulties or ringing in the ears. RESPIRATORY: No new SOB, PND, orthopnea or cough. CARDIOVASCULAR: See HPI  GI: No nausea, vomiting, diarrhea, constipation, abdominal pain or changes in bowel habits. : No urinary frequency, urgency, incontinence hematuria or dysuria. SKIN: No cyanosis or skin lesions. MUSCULOSKELETAL: No new muscle or joint pain.   NEUROLOGICAL: No syncope or TIA-like symptoms. PSYCHIATRIC: No anxiety, pain, insomnia or depression    Objective:   PHYSICAL EXAM:        VITALS:    Vitals:    08/27/18 0912   BP: 110/70   Pulse: 64         CONSTITUTIONAL: Cooperative, no apparent distress, and appears well nourished / developed  NEUROLOGIC:  Awake and orientated to person, place and time. PSYCH: Calm affect. SKIN: Warm and dry. HEENT: Sclera non-icteric, normocephalic, neck supple, no elevation of JVP, normal carotid pulses with no bruits and thyroid normal size. LUNGS:  No increased work of breathing and clear to auscultation, no crackles or wheezing. CARDIOVASCULAR:  Regular rate and rhythm with no murmurs, gallops, rubs, or abnormal heart sounds, normal PMI. The apical impulses not displaced. Heart tones are crisp and normal                                                         Cervical veins are not engorged                 JVP less than 8 cm H2O                                                                              The carotid upstroke is normal in amplitude and contour without delay or bruit    ABDOMEN:  Normal bowel sounds, non-distended and non-tender to palpation   EXT: No edema, no calf tenderness. Pulses are present bilaterally.     DATA:    Lab Results   Component Value Date    ALT 14 08/18/2018    AST 18 08/18/2018    ALKPHOS 101 08/18/2018    BILITOT 0.6 08/18/2018     Lab Results   Component Value Date    CREATININE 0.9 08/18/2018    BUN 28 (H) 08/18/2018     08/18/2018    K 3.8 08/18/2018     08/18/2018    CO2 23 08/18/2018     Lab Results   Component Value Date    TSH 2.40 08/02/2012    [id number] 8.8 09/28/2011     Lab Results   Component Value Date    WBC 8.9 08/18/2018    HGB 11.5 (L) 08/18/2018    HCT 35.3 (L) 08/18/2018    MCV 70.6 (L) 08/18/2018     08/18/2018     No components found for: CHLPL  Lab Results   Component Value Date    TRIG 89 09/20/2017    TRIG 51 08/07/2015    TRIG 64 04/16/2012     Lab

## 2018-08-27 NOTE — LETTER
has cpap but hasn't used \"in a long time\"     Social History:    History   Smoking Status    Former Smoker    Packs/day: 0.25    Years: 25.00    Types: Cigarettes, Pipe, Cigars    Quit date: 1/1/2011   Smokeless Tobacco    Former User     Comment: more than 5 cigars per day     Current Medications:  Current Outpatient Prescriptions   Medication Sig Dispense Refill    potassium chloride (KLOR-CON M) 20 MEQ extended release tablet Take 1 tablet by mouth daily 90 tablet 3    doxycycline hyclate (VIBRA-TABS) 100 MG tablet Take 1 tablet by mouth 2 times daily for 10 days 20 tablet 0    losartan (COZAAR) 100 MG tablet Take 1 tablet by mouth daily 90 tablet 3    metoprolol succinate (TOPROL XL) 50 MG extended release tablet Take 1 tablet by mouth daily 30 tablet 3    albuterol sulfate HFA (PROVENTIL HFA) 108 (90 Base) MCG/ACT inhaler Inhale 2 puffs into the lungs every 4 hours as needed for Wheezing 1 Inhaler 5    doxazosin (CARDURA) 4 MG tablet Take 1 tablet by mouth nightly 90 tablet 1    umeclidinium-vilanterol (ANORO ELLIPTA) 62.5-25 MCG/INH AEPB inhaler Inhale 1 puff into the lungs daily 1 each 11    furosemide (LASIX) 40 MG tablet Take 1 tablet by mouth daily 90 tablet 3    ticagrelor (BRILINTA) 90 MG TABS tablet Take 1 tablet by mouth 2 times daily 180 tablet 3    nitroGLYCERIN (NITROSTAT) 0.4 MG SL tablet Place 1 tablet under the tongue every 5 minutes as needed for Chest pain 25 tablet 1    digoxin (LANOXIN) 125 MCG tablet Take 62.5 mcg by mouth daily       aspirin 81 MG chewable tablet Take 81 mg by mouth daily      clonazePAM (KLONOPIN) 1 MG tablet Take 1 mg by mouth 2 times daily as needed for Anxiety      metFORMIN (GLUCOPHAGE) 500 MG tablet Take 500 mg by mouth 2 times daily (with meals)      simvastatin (ZOCOR) 40 MG tablet Take 0.5 tablets by mouth nightly 30 tablet 3     No current facility-administered medications for this visit.       REVIEW OF SYSTEMS: CONSTITUTIONAL: No major weight gain or loss, fatigue, weakness, night sweats or fever. There's been no change in energy level, sleep pattern, or activity level. HEENT: No new vision difficulties or ringing in the ears. RESPIRATORY: No new SOB, PND, orthopnea or cough. CARDIOVASCULAR: See HPI  GI: No nausea, vomiting, diarrhea, constipation, abdominal pain or changes in bowel habits. : No urinary frequency, urgency, incontinence hematuria or dysuria. SKIN: No cyanosis or skin lesions. MUSCULOSKELETAL: No new muscle or joint pain. NEUROLOGICAL: No syncope or TIA-like symptoms. PSYCHIATRIC: No anxiety, pain, insomnia or depression    Objective:   PHYSICAL EXAM:        VITALS:    Vitals:    08/27/18 0912   BP: 110/70   Pulse: 64         CONSTITUTIONAL: Cooperative, no apparent distress, and appears well nourished / developed  NEUROLOGIC:  Awake and orientated to person, place and time. PSYCH: Calm affect. SKIN: Warm and dry. HEENT: Sclera non-icteric, normocephalic, neck supple, no elevation of JVP, normal carotid pulses with no bruits and thyroid normal size. LUNGS:  No increased work of breathing and clear to auscultation, no crackles or wheezing. CARDIOVASCULAR:  Regular rate and rhythm with no murmurs, gallops, rubs, or abnormal heart sounds, normal PMI. The apical impulses not displaced. Heart tones are crisp and normal                                                         Cervical veins are not engorged                 JVP less than 8 cm H2O                                                                              The carotid upstroke is normal in amplitude and contour without delay or bruit    ABDOMEN:  Normal bowel sounds, non-distended and non-tender to palpation   EXT: No edema, no calf tenderness. Pulses are present bilaterally.     DATA:    Lab Results   Component Value Date    ALT 14 08/18/2018    AST 18 08/18/2018    ALKPHOS 101 08/18/2018 BILITOT 0.6 08/18/2018     Lab Results   Component Value Date    CREATININE 0.9 08/18/2018    BUN 28 (H) 08/18/2018     08/18/2018    K 3.8 08/18/2018     08/18/2018    CO2 23 08/18/2018     Lab Results   Component Value Date    TSH 2.40 08/02/2012    [id number] 8.8 09/28/2011     Lab Results   Component Value Date    WBC 8.9 08/18/2018    HGB 11.5 (L) 08/18/2018    HCT 35.3 (L) 08/18/2018    MCV 70.6 (L) 08/18/2018     08/18/2018     No components found for: CHLPL  Lab Results   Component Value Date    TRIG 89 09/20/2017    TRIG 51 08/07/2015    TRIG 64 04/16/2012     Lab Results   Component Value Date    HDL 35 (A) 09/20/2017    HDL 33 (A) 08/07/2015    HDL 31 (L) 04/16/2012     Lab Results   Component Value Date    LDLCALC 60 09/20/2017    LDLCALC 72 08/07/2015    LDLCALC 82 04/16/2012     Lab Results   Component Value Date    LABVLDL 18 09/20/2017    LABVLDL 10 08/07/2015    LABVLDL 13 04/16/2012     Radiology Review:  Pertinent images / reports were reviewed as a part of this visit and reveals the following:  3/16 lexiscan   There is an inferior fixed defect consistent with diaphragmatic attenuation. There is no evidence of stress induced ischemia. Normal LV function. ECHO [facility] 1/17  EF 25-30%         Cardiac cath: '04: 30% origin LAD, 30-40% mid-LAD     CATH 1/17_ single vessel disease 100% prox LAD, overlapping synergy stents to prox LAD, successful POBA of distal LAD with balloon, LV 30-35%         Assessment:   1. Hypertension       Today's B/P-110/70        Stable, continue current medications     2. Cardiomyopathy        Patient appears stable on physical exam        On Losartan, Toprol XL, and Lasix        Plan: continue current medications     3. Anterior wall MI- overlapping stents to LAD 1/17 1/17: S/p PCI of LAD       On ASA, Brilinta, Losartan, Toprol XL, and Zocor       + chest pain        Plan: schedule Myoview stress test     4.   Hyperlipidemia- 9/17: HDL: 35, LDL: 60        On Zocor       Plan:continue current medication     Patient  is stable since hospital discharge. Plan:   Schedule Myoview stress test  Continue current medications  Follow up in one month/ test dependent     I have addressed the patient's cardiac risk factors and adjusted pharmacologic treatment as needed. In addition, I have reinforced the need for patient directed risk factor modification. Further evaluation will be based upon the patient's clinical course and testing results. All questions and concerns were addressed to the patient/family. Alternatives to  treatment were discussed. The patient  currently  is not smoking. The risks related to smoking were reviewed with the patient. Recommend maintaining a smoke-free lifestyle. Products available for smoking cessation were discussed. Daily weight, low sodium diet were discussed. Patient instructed to call the office with a weight gain: > 3 lbs over night or 5 lbs in one week; swelling, SOB/orthopnea/PND    Dual Antiplatelet therapy has been prescribed for this patient. Education conducted on adverse reactions including bleeding was discussed. The patient verbalizes understanding. Pt is on a BB  Pt is on an ace-i/ARB  Pt is on a statin    Saturated fat diet discussed  Exercise program discussed    Thank you for allowing to us to participate in the care of Quincy Bass. [facility]  [place] CNP          If you have questions, please do not hesitate to call me. I look forward to following Michael Rodriguez along with you.     Sincerely,        ALEXEI Barillas CNP

## 2018-08-27 NOTE — COMMUNICATION BODY
days 20 tablet 0    losartan (COZAAR) 100 MG tablet Take 1 tablet by mouth daily 90 tablet 3    metoprolol succinate (TOPROL XL) 50 MG extended release tablet Take 1 tablet by mouth daily 30 tablet 3    albuterol sulfate HFA (PROVENTIL HFA) 108 (90 Base) MCG/ACT inhaler Inhale 2 puffs into the lungs every 4 hours as needed for Wheezing 1 Inhaler 5    doxazosin (CARDURA) 4 MG tablet Take 1 tablet by mouth nightly 90 tablet 1    umeclidinium-vilanterol (ANORO ELLIPTA) 62.5-25 MCG/INH AEPB inhaler Inhale 1 puff into the lungs daily 1 each 11    furosemide (LASIX) 40 MG tablet Take 1 tablet by mouth daily 90 tablet 3    ticagrelor (BRILINTA) 90 MG TABS tablet Take 1 tablet by mouth 2 times daily 180 tablet 3    nitroGLYCERIN (NITROSTAT) 0.4 MG SL tablet Place 1 tablet under the tongue every 5 minutes as needed for Chest pain 25 tablet 1    digoxin (LANOXIN) 125 MCG tablet Take 62.5 mcg by mouth daily       aspirin 81 MG chewable tablet Take 81 mg by mouth daily      clonazePAM (KLONOPIN) 1 MG tablet Take 1 mg by mouth 2 times daily as needed for Anxiety      metFORMIN (GLUCOPHAGE) 500 MG tablet Take 500 mg by mouth 2 times daily (with meals)      simvastatin (ZOCOR) 40 MG tablet Take 0.5 tablets by mouth nightly 30 tablet 3     No current facility-administered medications for this visit. REVIEW OF SYSTEMS:   CONSTITUTIONAL: No major weight gain or loss, fatigue, weakness, night sweats or fever. There's been no change in energy level, sleep pattern, or activity level. HEENT: No new vision difficulties or ringing in the ears. RESPIRATORY: No new SOB, PND, orthopnea or cough. CARDIOVASCULAR: See HPI  GI: No nausea, vomiting, diarrhea, constipation, abdominal pain or changes in bowel habits. : No urinary frequency, urgency, incontinence hematuria or dysuria. SKIN: No cyanosis or skin lesions. MUSCULOSKELETAL: No new muscle or joint pain.   NEUROLOGICAL: No syncope or TIA-like Results   Component Value Date    HDL 35 (A) 09/20/2017    HDL 33 (A) 08/07/2015    HDL 31 (L) 04/16/2012     Lab Results   Component Value Date    LDLCALC 60 09/20/2017    LDLCALC 72 08/07/2015    LDLCALC 82 04/16/2012     Lab Results   Component Value Date    LABVLDL 18 09/20/2017    LABVLDL 10 08/07/2015    LABVLDL 13 04/16/2012     Radiology Review:  Pertinent images / reports were reviewed as a part of this visit and reveals the following:  3/16 lexiscan   There is an inferior fixed defect consistent with diaphragmatic attenuation. There is no evidence of stress induced ischemia. Normal LV function. ECHO [facility] 1/17  EF 25-30%         Cardiac cath: '04: 30% origin LAD, 30-40% mid-LAD     CATH 1/17_ single vessel disease 100% prox LAD, overlapping synergy stents to prox LAD, successful POBA of distal LAD with balloon, LV 30-35%         Assessment:   1. Hypertension       Today's B/P-110/70        Stable, continue current medications     2. Cardiomyopathy        Patient appears stable on physical exam        On Losartan, Toprol XL, and Lasix        Plan: continue current medications     3. Anterior wall MI- overlapping stents to LAD 1/17 1/17: S/p PCI of LAD       On ASA, Brilinta, Losartan, Toprol XL, and Zocor       + chest pain        Plan: schedule Myoview stress test     4. Hyperlipidemia-       9/17: HDL: 35, LDL: 60        On Zocor       Plan:continue current medication     Patient  is stable since hospital discharge. Plan:   Schedule Myoview stress test  Continue current medications  Follow up in one month/ test dependent     I have addressed the patient's cardiac risk factors and adjusted pharmacologic treatment as needed. In addition, I have reinforced the need for patient directed risk factor modification. Further evaluation will be based upon the patient's clinical course and testing results. All questions and concerns were addressed to the patient/family.  Alternatives to

## 2018-08-30 ENCOUNTER — OFFICE VISIT (OUTPATIENT)
Dept: PULMONOLOGY | Age: 68
End: 2018-08-30

## 2018-08-30 ENCOUNTER — TELEPHONE (OUTPATIENT)
Dept: CARDIOLOGY CLINIC | Age: 68
End: 2018-08-30

## 2018-08-30 VITALS
BODY MASS INDEX: 35.98 KG/M2 | WEIGHT: 258 LBS | HEART RATE: 67 BPM | OXYGEN SATURATION: 97 % | SYSTOLIC BLOOD PRESSURE: 133 MMHG | DIASTOLIC BLOOD PRESSURE: 85 MMHG

## 2018-08-30 DIAGNOSIS — R06.02 SHORTNESS OF BREATH: ICD-10-CM

## 2018-08-30 DIAGNOSIS — J44.9 COPD, MODERATE (HCC): Primary | ICD-10-CM

## 2018-08-30 DIAGNOSIS — G47.33 OSA (OBSTRUCTIVE SLEEP APNEA): ICD-10-CM

## 2018-08-30 PROCEDURE — 1036F TOBACCO NON-USER: CPT | Performed by: NURSE PRACTITIONER

## 2018-08-30 PROCEDURE — G8926 SPIRO NO PERF OR DOC: HCPCS | Performed by: NURSE PRACTITIONER

## 2018-08-30 PROCEDURE — G8598 ASA/ANTIPLAT THER USED: HCPCS | Performed by: NURSE PRACTITIONER

## 2018-08-30 PROCEDURE — 1123F ACP DISCUSS/DSCN MKR DOCD: CPT | Performed by: NURSE PRACTITIONER

## 2018-08-30 PROCEDURE — G8427 DOCREV CUR MEDS BY ELIG CLIN: HCPCS | Performed by: NURSE PRACTITIONER

## 2018-08-30 PROCEDURE — 1101F PT FALLS ASSESS-DOCD LE1/YR: CPT | Performed by: NURSE PRACTITIONER

## 2018-08-30 PROCEDURE — 99213 OFFICE O/P EST LOW 20 MIN: CPT | Performed by: NURSE PRACTITIONER

## 2018-08-30 PROCEDURE — 4040F PNEUMOC VAC/ADMIN/RCVD: CPT | Performed by: NURSE PRACTITIONER

## 2018-08-30 PROCEDURE — 3017F COLORECTAL CA SCREEN DOC REV: CPT | Performed by: NURSE PRACTITIONER

## 2018-08-30 PROCEDURE — G8417 CALC BMI ABV UP PARAM F/U: HCPCS | Performed by: NURSE PRACTITIONER

## 2018-08-30 PROCEDURE — 3023F SPIROM DOC REV: CPT | Performed by: NURSE PRACTITIONER

## 2018-08-30 RX ORDER — IPRATROPIUM BROMIDE AND ALBUTEROL SULFATE 2.5; .5 MG/3ML; MG/3ML
1 SOLUTION RESPIRATORY (INHALATION) EVERY 6 HOURS PRN
Qty: 360 ML | Refills: 3 | Status: SHIPPED | OUTPATIENT
Start: 2018-08-30 | End: 2020-06-12 | Stop reason: ALTCHOICE

## 2018-08-30 RX ORDER — NITROGLYCERIN 0.4 MG/1
0.4 TABLET SUBLINGUAL EVERY 5 MIN PRN
Qty: 25 TABLET | Refills: 1 | Status: SHIPPED | OUTPATIENT
Start: 2018-08-30 | End: 2018-10-19 | Stop reason: SDUPTHER

## 2018-08-30 ASSESSMENT — ENCOUNTER SYMPTOMS
COUGH: 1
SINUS PRESSURE: 0
TROUBLE SWALLOWING: 0
COLOR CHANGE: 0
VOMITING: 0
SHORTNESS OF BREATH: 1
ABDOMINAL PAIN: 0
DIARRHEA: 0

## 2018-08-30 ASSESSMENT — SLEEP AND FATIGUE QUESTIONNAIRES
HOW LIKELY ARE YOU TO NOD OFF OR FALL ASLEEP WHILE WATCHING TV: 2
HOW LIKELY ARE YOU TO NOD OFF OR FALL ASLEEP WHILE SITTING AND READING: 1
HOW LIKELY ARE YOU TO NOD OFF OR FALL ASLEEP WHILE SITTING QUIETLY AFTER LUNCH WITHOUT ALCOHOL: 0
HOW LIKELY ARE YOU TO NOD OFF OR FALL ASLEEP IN A CAR, WHILE STOPPED FOR A FEW MINUTES IN TRAFFIC: 0
HOW LIKELY ARE YOU TO NOD OFF OR FALL ASLEEP WHILE SITTING AND TALKING TO SOMEONE: 0
HOW LIKELY ARE YOU TO NOD OFF OR FALL ASLEEP WHILE SITTING INACTIVE IN A PUBLIC PLACE: 0
HOW LIKELY ARE YOU TO NOD OFF OR FALL ASLEEP WHEN YOU ARE A PASSENGER IN A CAR FOR AN HOUR WITHOUT A BREAK: 0
NECK CIRCUMFERENCE (INCHES): 15.5
ESS TOTAL SCORE: 5
HOW LIKELY ARE YOU TO NOD OFF OR FALL ASLEEP WHILE LYING DOWN TO REST IN THE AFTERNOON WHEN CIRCUMSTANCES PERMIT: 2

## 2018-08-30 NOTE — PROGRESS NOTES
[facility] Follow Up Note    Subjective:   CHIEF COMPLAINT / HPI: COPD   The patient is 80 y.o. male who presents today for a routine follow up visit. There is a PMH significant for moderate COPD, chronic cough, cardiomyopathy and KARYNA he used PAP many years ago but stopped this. He was seen a couple of weeks ago for increased cough. At that time he was provided with Doxycycline, CXR, sputum culture and labs were also ordered. While Procalcitonin was low, sputum Cx was positive for heavy growth MRSA which was sensitive to Doxy. He has since completed this and feels much better. He reports baseline SOB which is intermittent. At times this is exacerbated by activity and other times it comes on suddenly at rest. He still has cough which is very rarely productive of greenish mucous but overall this has largely resolved. There are no fevers, chills or pleuritic chest pain. Of note, he did have an ED visit since his appointment here for chest pain. He plans to undergo stress test in a couple of weeks. In April he underwent bronchoscopy which was negative per Dr. Urmila Gonsales last office note, though I cannot see results of culture data from that procedure. He reports compliance with Anoro QD and SYMONE MDI 2-3 x daily. He does use supplemental O2.         Past Medical History:   Diagnosis Date    [facility]     Coronary artery disease involving native coronary artery of native heart with angina pectoris ([facility] [address] 1/18/2017    Depression     Diabetes mellitus ([facility] [address]     type II    Dysphagia     Enlarged prostate     Heart enlarged     Hyperlipidemia     Hypertension     MRSA (methicillin resistant staph aureus) culture positive 08/17/2018    sputum    Obesity     Pneumonia     multiple times 2018    Sleep apnea     has cpap but hasn't used \"in a long time\"     Social History:    History   Smoking Status    Former Smoker    Packs/day: 0.25    Years: 25.00    Types: Cigarettes, Pipe, Cigars    Quit date: 1/1/2011   Smokeless Tobacco    Former User     Comment: more than 5 cigars per day     Current Medications:  Current Outpatient Prescriptions on File Prior to Visit   Medication Sig Dispense Refill    potassium chloride (KLOR-CON M) 20 MEQ extended release tablet Take 1 tablet by mouth daily 90 tablet 3    losartan (COZAAR) 100 MG tablet Take 1 tablet by mouth daily 90 tablet 3    metoprolol succinate (TOPROL XL) 50 MG extended release tablet Take 1 tablet by mouth daily 30 tablet 3    albuterol sulfate HFA (PROVENTIL HFA) 108 (90 Base) MCG/ACT inhaler Inhale 2 puffs into the lungs every 4 hours as needed for Wheezing 1 Inhaler 5    doxazosin (CARDURA) 4 MG tablet Take 1 tablet by mouth nightly 90 tablet 1    umeclidinium-vilanterol (ANORO ELLIPTA) 62.5-25 MCG/INH AEPB inhaler Inhale 1 puff into the lungs daily 1 each 11    furosemide (LASIX) 40 MG tablet Take 1 tablet by mouth daily 90 tablet 3    ticagrelor (BRILINTA) 90 MG TABS tablet Take 1 tablet by mouth 2 times daily 180 tablet 3    nitroGLYCERIN (NITROSTAT) 0.4 MG SL tablet Place 1 tablet under the tongue every 5 minutes as needed for Chest pain 25 tablet 1    digoxin (LANOXIN) 125 MCG tablet Take 62.5 mcg by mouth daily       aspirin 81 MG chewable tablet Take 81 mg by mouth daily      clonazePAM (KLONOPIN) 1 MG tablet Take 1 mg by mouth 2 times daily as needed for Anxiety      metFORMIN (GLUCOPHAGE) 500 MG tablet Take 500 mg by mouth 2 times daily (with meals)      simvastatin (ZOCOR) 40 MG tablet Take 0.5 tablets by mouth nightly 30 tablet 3     No current facility-administered medications on file prior to visit. Review of Systems   Constitutional: Negative for chills and fever. HENT: Negative for congestion, sinus pressure and trouble swallowing. Respiratory: Positive for cough and shortness of breath. Cardiovascular: Negative for chest pain and leg swelling.

## 2018-09-11 ENCOUNTER — HOSPITAL ENCOUNTER (OUTPATIENT)
Dept: NON INVASIVE DIAGNOSTICS | Age: 68
Discharge: HOME | End: 2018-09-11
Attending: EMERGENCY MEDICINE | Admitting: EMERGENCY MEDICINE

## 2018-09-11 DIAGNOSIS — I10 ESSENTIAL (PRIMARY) HYPERTENSION: ICD-10-CM

## 2018-09-11 LAB
LV EF: 41 %
LVEF MODALITY: NORMAL

## 2018-09-11 NOTE — PROGRESS NOTES
Instructed on Lexiscan Stress Test Procedure including possible side effects/ adverse reactions. Patient verbalizes  understanding and denies having any questions . See [address] [place] [address] Rd Cardiology.   Tom Mathew RN

## 2018-09-18 ENCOUNTER — TELEPHONE (OUTPATIENT)
Dept: PULMONOLOGY | Age: 68
End: 2018-09-18

## 2018-09-18 DIAGNOSIS — R05.9 COUGH: Primary | ICD-10-CM

## 2018-09-18 NOTE — TELEPHONE ENCOUNTER
He was recently treated for MRSA and was reportedly better at last visit. If feeling sick again, needs to be seen and repeat CXR.

## 2018-09-19 ENCOUNTER — OFFICE VISIT (OUTPATIENT)
Dept: PULMONOLOGY | Age: 68
End: 2018-09-19

## 2018-09-19 ENCOUNTER — HOSPITAL ENCOUNTER (OUTPATIENT)
Dept: OTHER | Age: 68
Discharge: HOME | End: 2018-09-19
Attending: NURSE PRACTITIONER | Admitting: NURSE PRACTITIONER

## 2018-09-19 VITALS
DIASTOLIC BLOOD PRESSURE: 86 MMHG | HEART RATE: 62 BPM | WEIGHT: 259 LBS | SYSTOLIC BLOOD PRESSURE: 133 MMHG | OXYGEN SATURATION: 97 % | BODY MASS INDEX: 36.12 KG/M2

## 2018-09-19 DIAGNOSIS — R05.9 COUGH: ICD-10-CM

## 2018-09-19 DIAGNOSIS — R05.3 CHRONIC COUGH: Primary | ICD-10-CM

## 2018-09-19 DIAGNOSIS — R13.12 OROPHARYNGEAL DYSPHAGIA: ICD-10-CM

## 2018-09-19 DIAGNOSIS — J44.9 COPD, MODERATE (HCC): ICD-10-CM

## 2018-09-19 PROCEDURE — G8598 ASA/ANTIPLAT THER USED: HCPCS | Performed by: NURSE PRACTITIONER

## 2018-09-19 PROCEDURE — 1101F PT FALLS ASSESS-DOCD LE1/YR: CPT | Performed by: NURSE PRACTITIONER

## 2018-09-19 PROCEDURE — 4040F PNEUMOC VAC/ADMIN/RCVD: CPT | Performed by: NURSE PRACTITIONER

## 2018-09-19 PROCEDURE — 1123F ACP DISCUSS/DSCN MKR DOCD: CPT | Performed by: NURSE PRACTITIONER

## 2018-09-19 PROCEDURE — 3017F COLORECTAL CA SCREEN DOC REV: CPT | Performed by: NURSE PRACTITIONER

## 2018-09-19 PROCEDURE — 99214 OFFICE O/P EST MOD 30 MIN: CPT | Performed by: NURSE PRACTITIONER

## 2018-09-19 PROCEDURE — 1036F TOBACCO NON-USER: CPT | Performed by: NURSE PRACTITIONER

## 2018-09-19 PROCEDURE — 3023F SPIROM DOC REV: CPT | Performed by: NURSE PRACTITIONER

## 2018-09-19 PROCEDURE — G8926 SPIRO NO PERF OR DOC: HCPCS | Performed by: NURSE PRACTITIONER

## 2018-09-19 PROCEDURE — G8417 CALC BMI ABV UP PARAM F/U: HCPCS | Performed by: NURSE PRACTITIONER

## 2018-09-19 PROCEDURE — G8427 DOCREV CUR MEDS BY ELIG CLIN: HCPCS | Performed by: NURSE PRACTITIONER

## 2018-09-19 ASSESSMENT — ENCOUNTER SYMPTOMS
TROUBLE SWALLOWING: 0
COUGH: 1
SHORTNESS OF BREATH: 1
COLOR CHANGE: 0
VOMITING: 0
ABDOMINAL PAIN: 0
DIARRHEA: 0
SINUS PRESSURE: 0

## 2018-09-19 NOTE — PROGRESS NOTES
Prior to Visit   Medication Sig Dispense Refill    ipratropium-albuterol (DUONEB) 0.5-2.5 (3) MG/3ML SOLN nebulizer solution Inhale 3 mLs into the lungs every 6 hours as needed for Shortness of Breath 360 mL 3    Nebulizers (COMPRESSOR/NEBULIZER) MISC 1 Units by Does not apply route every 6 hours as needed (sob) 1 each 3    nitroGLYCERIN (NITROSTAT) 0.4 MG SL tablet Place 1 tablet under the tongue every 5 minutes as needed for Chest pain 25 tablet 1    potassium chloride (KLOR-CON M) 20 MEQ extended release tablet Take 1 tablet by mouth daily 90 tablet 3    losartan (COZAAR) 100 MG tablet Take 1 tablet by mouth daily 90 tablet 3    metoprolol succinate (TOPROL XL) 50 MG extended release tablet Take 1 tablet by mouth daily 30 tablet 3    albuterol sulfate HFA (PROVENTIL HFA) 108 (90 Base) MCG/ACT inhaler Inhale 2 puffs into the lungs every 4 hours as needed for Wheezing 1 Inhaler 5    doxazosin (CARDURA) 4 MG tablet Take 1 tablet by mouth nightly 90 tablet 1    umeclidinium-vilanterol (ANORO ELLIPTA) 62.5-25 MCG/INH AEPB inhaler Inhale 1 puff into the lungs daily 1 each 11    furosemide (LASIX) 40 MG tablet Take 1 tablet by mouth daily 90 tablet 3    ticagrelor (BRILINTA) 90 MG TABS tablet Take 1 tablet by mouth 2 times daily 180 tablet 3    digoxin (LANOXIN) 125 MCG tablet Take 62.5 mcg by mouth daily       aspirin 81 MG chewable tablet Take 81 mg by mouth daily      clonazePAM (KLONOPIN) 1 MG tablet Take 1 mg by mouth 2 times daily as needed for Anxiety      metFORMIN (GLUCOPHAGE) 500 MG tablet Take 500 mg by mouth 2 times daily (with meals)      simvastatin (ZOCOR) 40 MG tablet Take 0.5 tablets by mouth nightly 30 tablet 3     No current facility-administered medications on file prior to visit. Review of Systems   Constitutional: Negative for chills and fever. HENT: Negative for congestion, sinus pressure and trouble swallowing. Respiratory: Positive for cough and shortness of breath.

## 2018-09-21 ENCOUNTER — HOSPITAL ENCOUNTER (OUTPATIENT)
Dept: SLEEP MEDICINE | Age: 68
Discharge: HOME | End: 2018-09-22
Attending: NURSE PRACTITIONER | Admitting: NURSE PRACTITIONER

## 2018-09-21 DIAGNOSIS — R07.9 CHEST PAIN: ICD-10-CM

## 2018-09-21 DIAGNOSIS — G47.33 OSA (OBSTRUCTIVE SLEEP APNEA): ICD-10-CM

## 2018-09-26 ENCOUNTER — TELEPHONE (OUTPATIENT)
Dept: PULMONOLOGY | Age: 68
End: 2018-09-26

## 2018-09-26 DIAGNOSIS — G47.33 OSA (OBSTRUCTIVE SLEEP APNEA): Primary | ICD-10-CM

## 2018-09-26 NOTE — TELEPHONE ENCOUNTER
Called patient to see if he would prefer face mask or nasal canula, inform of results and inform that we will send to Dr. Catherine Trejo

## 2018-09-27 DIAGNOSIS — G47.33 OBSTRUCTIVE SLEEP APNEA (ADULT) (PEDIATRIC): Primary | ICD-10-CM

## 2018-10-08 VITALS — HEIGHT: 71 IN | BODY MASS INDEX: 36.12 KG/M2

## 2018-10-09 ENCOUNTER — OFFICE VISIT (OUTPATIENT)
Dept: CARDIOLOGY CLINIC | Age: 68
End: 2018-10-09

## 2018-10-09 DIAGNOSIS — I25.10 CORONARY ARTERY DISEASE INVOLVING NATIVE CORONARY ARTERY OF NATIVE HEART WITHOUT ANGINA PECTORIS: Primary | ICD-10-CM

## 2018-10-10 ENCOUNTER — OFFICE VISIT (OUTPATIENT)
Dept: CARDIOLOGY CLINIC | Age: 68
End: 2018-10-10
Payer: MEDICARE

## 2018-10-10 VITALS
SYSTOLIC BLOOD PRESSURE: 110 MMHG | DIASTOLIC BLOOD PRESSURE: 60 MMHG | WEIGHT: 258 LBS | HEART RATE: 74 BPM | HEIGHT: 71 IN | BODY MASS INDEX: 36.12 KG/M2

## 2018-10-10 DIAGNOSIS — I25.5 ISCHEMIC CARDIOMYOPATHY: ICD-10-CM

## 2018-10-10 DIAGNOSIS — I10 ESSENTIAL HYPERTENSION, BENIGN: ICD-10-CM

## 2018-10-10 DIAGNOSIS — I25.119 CORONARY ARTERY DISEASE INVOLVING NATIVE CORONARY ARTERY OF NATIVE HEART WITH ANGINA PECTORIS (HCC): ICD-10-CM

## 2018-10-10 DIAGNOSIS — I10 ESSENTIAL HYPERTENSION, MALIGNANT: Primary | ICD-10-CM

## 2018-10-10 PROCEDURE — 1123F ACP DISCUSS/DSCN MKR DOCD: CPT | Performed by: NURSE PRACTITIONER

## 2018-10-10 PROCEDURE — 4040F PNEUMOC VAC/ADMIN/RCVD: CPT | Performed by: NURSE PRACTITIONER

## 2018-10-10 PROCEDURE — 1036F TOBACCO NON-USER: CPT | Performed by: NURSE PRACTITIONER

## 2018-10-10 PROCEDURE — 1101F PT FALLS ASSESS-DOCD LE1/YR: CPT | Performed by: NURSE PRACTITIONER

## 2018-10-10 PROCEDURE — 3017F COLORECTAL CA SCREEN DOC REV: CPT | Performed by: NURSE PRACTITIONER

## 2018-10-10 PROCEDURE — G8417 CALC BMI ABV UP PARAM F/U: HCPCS | Performed by: NURSE PRACTITIONER

## 2018-10-10 PROCEDURE — 99214 OFFICE O/P EST MOD 30 MIN: CPT | Performed by: NURSE PRACTITIONER

## 2018-10-10 PROCEDURE — G8484 FLU IMMUNIZE NO ADMIN: HCPCS | Performed by: NURSE PRACTITIONER

## 2018-10-10 PROCEDURE — G8598 ASA/ANTIPLAT THER USED: HCPCS | Performed by: NURSE PRACTITIONER

## 2018-10-10 PROCEDURE — G8427 DOCREV CUR MEDS BY ELIG CLIN: HCPCS | Performed by: NURSE PRACTITIONER

## 2018-10-10 RX ORDER — FUROSEMIDE 40 MG/1
40 TABLET ORAL DAILY
Qty: 90 TABLET | Refills: 3 | Status: SHIPPED | OUTPATIENT
Start: 2018-10-10 | End: 2018-12-06 | Stop reason: SDUPTHER

## 2018-10-10 RX ORDER — METOPROLOL SUCCINATE 50 MG/1
50 TABLET, EXTENDED RELEASE ORAL DAILY
Qty: 90 TABLET | Refills: 3 | Status: SHIPPED | OUTPATIENT
Start: 2018-10-10 | End: 2019-12-13 | Stop reason: SDUPTHER

## 2018-10-10 NOTE — PROGRESS NOTES
[facility]     Outpatient Follow Up Note    CHIEF COMPLAINT / HPI:    Follow Up secondary to Hypertension/ CAD/ cardiomyopathy/ and hyperlipidemia   He has hx of    Hypertension/ CAD/ cardiomyopathy/ and hyperlipidemia     Patient was seen in the ER on 8/18/18, cardiac enzymes and EKG -negative results.  He then had GXT which did not show any ischemia and EF was 41%    He has been under lots of stress at home    Past Medical History:   Diagnosis Date    [facility] (chronic obstructive pulmonary disease) ([facility] [address]     Coronary artery disease involving native coronary artery of native heart with angina pectoris ([facility] [address] 1/18/2017    Depression     Diabetes mellitus (HCC)     type II    Dysphagia     Enlarged prostate     Heart enlarged     Hyperlipidemia     Hypertension     MRSA (methicillin resistant staph aureus) culture positive 08/17/2018    sputum    Obesity     Pneumonia     multiple times 2018    Sleep apnea     has cpap but hasn't used \"in a long time\"     Social History:    History   Smoking Status    Former Smoker    Packs/day: 0.25    Years: 25.00    Types: Cigarettes, Pipe, Cigars    Quit date: 1/1/2011   Smokeless Tobacco    Former User     Comment: more than 5 cigars per day     Current Medications:  Current Outpatient Prescriptions   Medication Sig Dispense Refill    ipratropium-albuterol (DUONEB) 0.5-2.5 (3) MG/3ML SOLN nebulizer solution Inhale 3 mLs into the lungs every 6 hours as needed for Shortness of Breath 360 mL 3    Nebulizers (COMPRESSOR/NEBULIZER) MISC 1 Units by Does not apply route every 6 hours as needed (sob) 1 each 3    nitroGLYCERIN (NITROSTAT) 0.4 MG SL tablet Place 1 tablet under the tongue every 5 minutes as needed for Chest pain 25 tablet 1    potassium chloride (KLOR-CON M) 20 MEQ extended release tablet Take 1 tablet by mouth daily 90 tablet 3    losartan (COZAAR) 100 MG tablet Take 1 tablet by mouth daily 90 tablet 3    metoprolol succinate (TOPROL XL)

## 2018-10-19 RX ORDER — NITROGLYCERIN 0.4 MG/1
0.4 TABLET SUBLINGUAL EVERY 5 MIN PRN
Qty: 25 TABLET | Refills: 1 | Status: SHIPPED | OUTPATIENT
Start: 2018-10-19 | End: 2020-03-04 | Stop reason: SDUPTHER

## 2018-10-29 RX ORDER — ROFLUMILAST 500 UG/1
TABLET ORAL
Qty: 90 TABLET | Refills: 1 | OUTPATIENT
Start: 2018-10-29

## 2018-11-28 ENCOUNTER — OFFICE VISIT (OUTPATIENT)
Dept: CARDIOLOGY CLINIC | Age: 68
End: 2018-11-28
Payer: MEDICARE

## 2018-11-28 VITALS
SYSTOLIC BLOOD PRESSURE: 130 MMHG | HEIGHT: 71 IN | HEART RATE: 58 BPM | WEIGHT: 260 LBS | BODY MASS INDEX: 36.4 KG/M2 | DIASTOLIC BLOOD PRESSURE: 70 MMHG

## 2018-11-28 DIAGNOSIS — I10 ESSENTIAL HYPERTENSION, BENIGN: ICD-10-CM

## 2018-11-28 DIAGNOSIS — E78.2 MIXED HYPERLIPIDEMIA: Primary | ICD-10-CM

## 2018-11-28 DIAGNOSIS — I25.119 CORONARY ARTERY DISEASE INVOLVING NATIVE CORONARY ARTERY OF NATIVE HEART WITH ANGINA PECTORIS (HCC): ICD-10-CM

## 2018-11-28 PROCEDURE — 4040F PNEUMOC VAC/ADMIN/RCVD: CPT | Performed by: NURSE PRACTITIONER

## 2018-11-28 PROCEDURE — 1036F TOBACCO NON-USER: CPT | Performed by: NURSE PRACTITIONER

## 2018-11-28 PROCEDURE — 99214 OFFICE O/P EST MOD 30 MIN: CPT | Performed by: NURSE PRACTITIONER

## 2018-11-28 PROCEDURE — 1123F ACP DISCUSS/DSCN MKR DOCD: CPT | Performed by: NURSE PRACTITIONER

## 2018-11-28 PROCEDURE — G8417 CALC BMI ABV UP PARAM F/U: HCPCS | Performed by: NURSE PRACTITIONER

## 2018-11-28 PROCEDURE — G8598 ASA/ANTIPLAT THER USED: HCPCS | Performed by: NURSE PRACTITIONER

## 2018-11-28 PROCEDURE — G8427 DOCREV CUR MEDS BY ELIG CLIN: HCPCS | Performed by: NURSE PRACTITIONER

## 2018-11-28 PROCEDURE — 1101F PT FALLS ASSESS-DOCD LE1/YR: CPT | Performed by: NURSE PRACTITIONER

## 2018-11-28 PROCEDURE — G8484 FLU IMMUNIZE NO ADMIN: HCPCS | Performed by: NURSE PRACTITIONER

## 2018-11-28 PROCEDURE — 3017F COLORECTAL CA SCREEN DOC REV: CPT | Performed by: NURSE PRACTITIONER

## 2018-11-28 RX ORDER — SPIRONOLACTONE 25 MG/1
25 TABLET ORAL DAILY
Qty: 90 TABLET | Refills: 1 | Status: SHIPPED | OUTPATIENT
Start: 2018-11-28 | End: 2019-05-23 | Stop reason: SDUPTHER

## 2018-11-28 NOTE — COMMUNICATION BODY
[facility]     Outpatient Follow Up Note    CHIEF COMPLAINT / HPI:    Follow Up secondary to Hypertension/ CAD/ cardiomyopathy/ and hyperlipidemia   He has hx of    Hypertension/ CAD/ cardiomyopathy/ and hyperlipidemia     Patient was seen in the ER on 8/18/18, cardiac enzymes and EKG -negative results. He then had GXT which did not show any ischemia and EF was 41%    He has been under lots of stress at home, he has not had any issues with heart failure.       Past Medical History:   Diagnosis Date    [facility] (chronic obstructive [facility] disease) ([facility] [address]     Coronary artery disease involving native coronary artery of native heart with angina pectoris ([facility] [address] 1/18/2017    Depression     Diabetes mellitus (HCC)     type II    Dysphagia     Enlarged prostate     Heart enlarged     Hyperlipidemia     Hypertension     MRSA (methicillin resistant staph aureus) culture positive 08/17/2018    sputum    Obesity     Pneumonia     multiple times 2018    Sleep apnea     has cpap but hasn't used \"in a long time\"     Social History:    History   Smoking Status    Former Smoker    Packs/day: 0.25    Years: 25.00    Types: Cigarettes, Pipe, Cigars    Quit date: 1/1/2011   Smokeless Tobacco    Former User     Comment: more than 5 cigars per day     Current Medications:  Current Outpatient Prescriptions   Medication Sig Dispense Refill    spironolactone (ALDACTONE) 25 MG tablet Take 1 tablet by mouth daily 90 tablet 1    ticagrelor (BRILINTA) 90 MG TABS tablet Take 1 tablet by mouth 2 times daily 180 tablet 3    nitroGLYCERIN (NITROSTAT) 0.4 MG SL tablet Place 1 tablet under the tongue every 5 minutes as needed for Chest pain 25 tablet 1    metoprolol succinate (TOPROL XL) 50 MG extended release tablet Take 1 tablet by mouth daily 90 tablet 3    furosemide (LASIX) 40 MG tablet Take 1 tablet by mouth daily 90 tablet 3    ipratropium-albuterol (DUONEB) 0.5-2.5 (3) MG/3ML SOLN nebulizer solution Inhale 3 mLs into the lungs every 6 hours as needed for Shortness of Breath 360 mL 3    Nebulizers (COMPRESSOR/NEBULIZER) MISC 1 Units by Does not apply route every 6 hours as needed (sob) 1 each 3    losartan (COZAAR) 100 MG tablet Take 1 tablet by mouth daily 90 tablet 3    albuterol sulfate HFA (PROVENTIL HFA) 108 (90 Base) MCG/ACT inhaler Inhale 2 puffs into the lungs every 4 hours as needed for Wheezing 1 Inhaler 5    doxazosin (CARDURA) 4 MG tablet Take 1 tablet by mouth nightly 90 tablet 1    umeclidinium-vilanterol (ANORO ELLIPTA) 62.5-25 MCG/INH AEPB inhaler Inhale 1 puff into the lungs daily 1 each 11    digoxin (LANOXIN) 125 MCG tablet Take 62.5 mcg by mouth daily       aspirin 81 MG chewable tablet Take 81 mg by mouth daily      clonazePAM (KLONOPIN) 1 MG tablet Take 1 mg by mouth 2 times daily as needed for Anxiety      metFORMIN (GLUCOPHAGE) 500 MG tablet Take 500 mg by mouth 2 times daily (with meals)      simvastatin (ZOCOR) 40 MG tablet Take 0.5 tablets by mouth nightly 30 tablet 3     No current facility-administered medications for this visit. REVIEW OF SYSTEMS:   CONSTITUTIONAL: No major weight gain or loss, fatigue, weakness, night sweats or fever. There's been no change in energy level, sleep pattern, or activity level. HEENT: No new vision difficulties or ringing in the ears. RESPIRATORY: No new SOB, PND, orthopnea or cough. CARDIOVASCULAR: See HPI  GI: No nausea, vomiting, diarrhea, constipation, abdominal pain or changes in bowel habits. : No urinary frequency, urgency, incontinence hematuria or dysuria. SKIN: No cyanosis or skin lesions. MUSCULOSKELETAL: No new muscle or joint pain. NEUROLOGICAL: No syncope or TIA-like symptoms.   PSYCHIATRIC: No anxiety, pain, insomnia or depression    Objective:   PHYSICAL EXAM:        VITALS:    Vitals:    11/26/18 1631   BP: 130/70   Pulse: 58     CONSTITUTIONAL: Cooperative, no apparent distress, and appears well nourished /

## 2018-11-28 NOTE — LETTER
: No urinary frequency, urgency, incontinence hematuria or dysuria. SKIN: No cyanosis or skin lesions. MUSCULOSKELETAL: No new muscle or joint pain. NEUROLOGICAL: No syncope or TIA-like symptoms. PSYCHIATRIC: No anxiety, pain, insomnia or depression    Objective:   PHYSICAL EXAM:        VITALS:    Vitals:    11/26/18 1631   BP: 130/70   Pulse: 58     CONSTITUTIONAL: Cooperative, no apparent distress, and appears well nourished / developed  NEUROLOGIC:  Awake and orientated to person, place and time. PSYCH: Calm affect. SKIN: Warm and dry. HEENT: Sclera non-icteric, normocephalic, neck supple, no elevation of JVP, normal carotid pulses with no bruits and thyroid normal size. LUNGS:  No increased work of breathing and clear to auscultation, no crackles or wheezing. CARDIOVASCULAR:  Regular rate and rhythm with no murmurs, gallops, rubs, or abnormal heart sounds, normal PMI. The apical impulses not displaced. Heart tones are crisp and normal    Cervical veins are not engorged                 JVP less than 8 cm H2O                                                                              The carotid upstroke is normal in amplitude and contour without delay or bruit    ABDOMEN:  Normal bowel sounds, non-distended and non-tender to palpation   EXT: No edema, no calf tenderness. Pulses are present bilaterally.     DATA:    Lab Results   Component Value Date    ALT 14 08/18/2018    AST 18 08/18/2018    ALKPHOS 101 08/18/2018    BILITOT 0.6 08/18/2018     Lab Results   Component Value Date    CREATININE 0.9 08/18/2018    BUN 28 (H) 08/18/2018     08/18/2018    K 3.8 08/18/2018     08/18/2018    CO2 23 08/18/2018     Lab Results   Component Value Date    TSH 2.40 08/02/2012    X1UICRI 8.8 09/28/2011     Lab Results   Component Value Date    WBC 8.9 08/18/2018    HGB 11.5 (L) 08/18/2018    HCT 35.3 (L) 08/18/2018    MCV 70.6 (L) 08/18/2018     08/18/2018     No components found for: [employer] Products available for smoking cessation were discussed. Daily weight, low sodium diet were discussed. Patient instructed to call the office with a weight gain: > 3 lbs over night or 5 lbs in one week; swelling, SOB/orthopnea/PND    Dual Antiplatelet therapy has been prescribed for this patient. Education conducted on adverse reactions including bleeding was discussed. The patient verbalizes understanding. Pt is on a BB  Pt is on an ace-i/ARB  Pt is on a statin    Saturated fat diet discussed  Exercise program discussed    Thank you for allowing to us to participate in the care of Weston Serrato. [address]    If you have questions, please do not hesitate to call me. I look forward to following Traci Rizvi along with you.     Sincerely,        ALEXEI Bean - CNP

## 2018-11-28 NOTE — PROGRESS NOTES
Saturated fat diet discussed  Exercise program discussed    Thank you for allowing to us to participate in the care of Jorge Cespedes.     2020 [facility]

## 2018-12-06 RX ORDER — FUROSEMIDE 40 MG/1
40 TABLET ORAL DAILY
Qty: 90 TABLET | Refills: 3 | Status: SHIPPED | OUTPATIENT
Start: 2018-12-06 | End: 2019-10-14 | Stop reason: SDUPTHER

## 2018-12-11 LAB
ALT SERPL-CCNC: 23 U/L (ref 12–78)
ANION GAP SERPL CALCULATED.3IONS-SCNC: 6 MMOL/L (ref 7–16)
AST SERPL-CCNC: 14 U/L (ref 15–37)
BUN BLDV-MCNC: 20 MG/DL (ref 7–18)
CALCIUM SERPL-MCNC: 9.1 MG/DL (ref 8.5–10.1)
CHLORIDE BLD-SCNC: 107 MMOL/L (ref 98–107)
CHOLESTEROL, STONE: 113 MG/DL
CO2: 27 MMOL/L (ref 21–32)
CREATININE + EGFR PANEL: 0.9 MG/DL (ref 0.6–1.3)
GFR AFRICAN AMERICAN: > 60 ML/MIN/1.73M2
GFR NON-AFRICAN AMERICAN: > 60 ML/MIN/1.73M2
GLUCOSE: 108 MG/DL (ref 74–106)
HDLC SERPL-MCNC: 36 MG/DL (ref 40–60)
LDL CHOLESTEROL CALCULATED: 66 MG/DL (ref 0–99)
POTASSIUM SERPL-SCNC: 3.8 MMOL/L (ref 3.5–5.1)
SODIUM BLD-SCNC: 140 MMOL/L (ref 136–145)
TRIGL SERPL-MCNC: 55 MG/DL (ref 0–149)
VLDLC SERPL CALC-MCNC: 11 MG/DL (ref 0–40)

## 2019-01-02 RX ORDER — DOXAZOSIN MESYLATE 4 MG/1
4 TABLET ORAL NIGHTLY
Qty: 90 TABLET | Refills: 1 | Status: SHIPPED | OUTPATIENT
Start: 2019-01-02 | End: 2019-09-04 | Stop reason: SDUPTHER

## 2019-03-06 ENCOUNTER — OFFICE VISIT (OUTPATIENT)
Dept: CARDIOLOGY CLINIC | Age: 69
End: 2019-03-06
Payer: MEDICARE

## 2019-03-06 VITALS
HEIGHT: 71 IN | BODY MASS INDEX: 35.42 KG/M2 | OXYGEN SATURATION: 98 % | DIASTOLIC BLOOD PRESSURE: 70 MMHG | WEIGHT: 253 LBS | SYSTOLIC BLOOD PRESSURE: 122 MMHG | HEART RATE: 62 BPM

## 2019-03-06 DIAGNOSIS — E78.2 MIXED HYPERLIPIDEMIA: ICD-10-CM

## 2019-03-06 DIAGNOSIS — I25.119 CORONARY ARTERY DISEASE INVOLVING NATIVE CORONARY ARTERY OF NATIVE HEART WITH ANGINA PECTORIS (HCC): ICD-10-CM

## 2019-03-06 DIAGNOSIS — R06.02 SOB (SHORTNESS OF BREATH): Primary | ICD-10-CM

## 2019-03-06 PROCEDURE — 99214 OFFICE O/P EST MOD 30 MIN: CPT | Performed by: NURSE PRACTITIONER

## 2019-03-06 PROCEDURE — 1123F ACP DISCUSS/DSCN MKR DOCD: CPT | Performed by: NURSE PRACTITIONER

## 2019-03-06 PROCEDURE — 4040F PNEUMOC VAC/ADMIN/RCVD: CPT | Performed by: NURSE PRACTITIONER

## 2019-03-06 PROCEDURE — 1101F PT FALLS ASSESS-DOCD LE1/YR: CPT | Performed by: NURSE PRACTITIONER

## 2019-03-06 PROCEDURE — G8427 DOCREV CUR MEDS BY ELIG CLIN: HCPCS | Performed by: NURSE PRACTITIONER

## 2019-03-06 PROCEDURE — 1036F TOBACCO NON-USER: CPT | Performed by: NURSE PRACTITIONER

## 2019-03-06 PROCEDURE — G8484 FLU IMMUNIZE NO ADMIN: HCPCS | Performed by: NURSE PRACTITIONER

## 2019-03-06 PROCEDURE — G8598 ASA/ANTIPLAT THER USED: HCPCS | Performed by: NURSE PRACTITIONER

## 2019-03-06 PROCEDURE — 3017F COLORECTAL CA SCREEN DOC REV: CPT | Performed by: NURSE PRACTITIONER

## 2019-03-06 PROCEDURE — G8417 CALC BMI ABV UP PARAM F/U: HCPCS | Performed by: NURSE PRACTITIONER

## 2019-03-06 RX ORDER — FERROUS SULFATE 325(65) MG
325 TABLET ORAL
COMMUNITY
End: 2020-06-03

## 2019-05-08 ENCOUNTER — OFFICE VISIT (OUTPATIENT)
Dept: PULMONOLOGY | Age: 69
End: 2019-05-08
Payer: MEDICARE

## 2019-05-08 VITALS
OXYGEN SATURATION: 97 % | WEIGHT: 249 LBS | BODY MASS INDEX: 34.73 KG/M2 | HEART RATE: 70 BPM | DIASTOLIC BLOOD PRESSURE: 83 MMHG | SYSTOLIC BLOOD PRESSURE: 132 MMHG

## 2019-05-08 DIAGNOSIS — R06.02 SOB (SHORTNESS OF BREATH): Primary | ICD-10-CM

## 2019-05-08 DIAGNOSIS — R05.3 CHRONIC COUGH: ICD-10-CM

## 2019-05-08 DIAGNOSIS — G47.33 OSA (OBSTRUCTIVE SLEEP APNEA): ICD-10-CM

## 2019-05-08 DIAGNOSIS — J44.9 COPD, MODERATE (HCC): ICD-10-CM

## 2019-05-08 PROCEDURE — 3017F COLORECTAL CA SCREEN DOC REV: CPT | Performed by: INTERNAL MEDICINE

## 2019-05-08 PROCEDURE — G8417 CALC BMI ABV UP PARAM F/U: HCPCS | Performed by: INTERNAL MEDICINE

## 2019-05-08 PROCEDURE — 1036F TOBACCO NON-USER: CPT | Performed by: INTERNAL MEDICINE

## 2019-05-08 PROCEDURE — G8427 DOCREV CUR MEDS BY ELIG CLIN: HCPCS | Performed by: INTERNAL MEDICINE

## 2019-05-08 PROCEDURE — 4040F PNEUMOC VAC/ADMIN/RCVD: CPT | Performed by: INTERNAL MEDICINE

## 2019-05-08 PROCEDURE — G8598 ASA/ANTIPLAT THER USED: HCPCS | Performed by: INTERNAL MEDICINE

## 2019-05-08 PROCEDURE — 1123F ACP DISCUSS/DSCN MKR DOCD: CPT | Performed by: INTERNAL MEDICINE

## 2019-05-08 PROCEDURE — G8926 SPIRO NO PERF OR DOC: HCPCS | Performed by: INTERNAL MEDICINE

## 2019-05-08 PROCEDURE — 3023F SPIROM DOC REV: CPT | Performed by: INTERNAL MEDICINE

## 2019-05-08 PROCEDURE — 99214 OFFICE O/P EST MOD 30 MIN: CPT | Performed by: INTERNAL MEDICINE

## 2019-05-08 RX ORDER — ALBUTEROL SULFATE 90 UG/1
2 AEROSOL, METERED RESPIRATORY (INHALATION) EVERY 4 HOURS PRN
Qty: 1 INHALER | Refills: 5 | Status: SHIPPED | OUTPATIENT
Start: 2019-05-08 | End: 2019-08-28 | Stop reason: SDUPTHER

## 2019-05-08 RX ORDER — DOXYCYCLINE HYCLATE 100 MG/1
100 CAPSULE ORAL DAILY
Qty: 10 CAPSULE | Refills: 3 | Status: SHIPPED | OUTPATIENT
Start: 2019-05-08 | End: 2019-05-18

## 2019-05-08 RX ORDER — PREDNISONE 10 MG/1
TABLET ORAL
Qty: 30 TABLET | Refills: 0 | Status: SHIPPED | OUTPATIENT
Start: 2019-05-08 | End: 2019-05-18

## 2019-05-08 NOTE — PROGRESS NOTES
[email or web address]  Progress Note  MD Shubham Roe   YOB: 1950    Date of Visit:  5/8/2019    Assessment/Plan:  1. Shortness of breath & 2. Chronic cough/Hemoptysis  . May be fighting a bornchitis  Spring allergies may play a role as well. Doxycycline 100mg, 10 days  Prednisone taper -- 40mg x 3 days; 30 mg x 3 days; 20 mg x 3 days; 10 mg x 3 days      3. COPD, moderate ([address]  PFT 12/14  Spirometry revealed decreased FVC at 3.43 L, which is 76% predicted. FEV1 is  decreased at 2.28 L, which is 63% predicted. FEV1/FVC ratio is reduced at  66%. There is no significant change after inhaled bronchodilators. Lung  volumes reveal normal total lung capacity. Vital capacity is mildly reduced. Residual volume is at the upper limits of normal. Diffusion capacity is  normal. In comparison to a study from 2012, spirometry is similar. The  total lung capacity is increased by 21% and residual volume is increased by  45%.       IMPRESSION: Moderate obstructive lung disease. There is a trend towards  hyperinflation/air trapping in comparison to the preceding study. Anoro  Albuterol. 4. Cardiomyopathy ([address]  LVEF 30%  Followed by cardiology    5. Coronary artery disease involving native coronary artery of native heart with angina pectoris ([address]  [facility] stents at [facility] 1/17    6. Essential hypertension, benign  BP is up today      FOLLOW UP: 6 months      Chief Complaint   Patient presents with    Cough     for a month now. Coughs so hard he feels like he is going to pass out. Has yellow phlegm every now and then       HPI  The patient presents with a chief complaint of shortness of breath and cough. He has been coughing a lot over the last 5 days. This happens mostly at night. He denies GERD symptoms. He is having trouble working in the yard. Cough is productive of occasional yellow sputum. He is wheezing intermittently.  No Chest pain, Nausea or vomiting reported    Review of Systems  As documented in HPI     Physical Exam:  Well developed, well nourished  Alert and oriented  Sclera is clear  No cervical adenopathy  No JVD. Chest examination is wheezing and congested. Cardiac examination reveals regular rate and rhythm without murmur, gallop or rub. The abdomen is soft, nontender and nondistended. There is no clubbing, cyanosis or edema of the extremities. There is no obvious skin rash. No focal neuro deficicts  Normal mood and affect    Allergies   Allergen Reactions    Lisinopril Other (See Comments)     COUGH     Prior to Visit Medications    Medication Sig Taking?  Authorizing Provider   ferrous sulfate 325 (65 Fe) MG tablet Take 325 mg by mouth daily (with breakfast)  Historical Provider, MD   doxazosin (CARDURA) 4 MG tablet Take 1 tablet by mouth nightly  ALEXEI Patrick CNP   furosemide (LASIX) 40 MG tablet Take 1 tablet by mouth daily  ALEXEI Patrick CNP   spironolactone (ALDACTONE) 25 MG tablet Take 1 tablet by mouth daily  ALEXEI Patrick CNP   ticagrelor (BRILINTA) 90 MG TABS tablet Take 1 tablet by mouth 2 times daily  ALEXEI Patrick CNP   nitroGLYCERIN (NITROSTAT) 0.4 MG SL tablet Place 1 tablet under the tongue every 5 minutes as needed for Chest pain  ALEXEI Patrick CNP   metoprolol succinate (TOPROL XL) 50 MG extended release tablet Take 1 tablet by mouth daily  ALEXEI Patrick CNP   ipratropium-albuterol (DUONEB) 0.5-2.5 (3) MG/3ML SOLN nebulizer solution Inhale 3 mLs into the lungs every 6 hours as needed for Shortness of Breath  ALEXEI Glass CNP   Nebulizers (COMPRESSOR/NEBULIZER) MISC 1 Units by Does not apply route every 6 hours as needed (sob)  ALEXEI Thomas CNP   losartan (COZAAR) 100 MG tablet Take 1 tablet by mouth daily  ALEXEI Patrick CNP   albuterol sulfate HFA (PROVENTIL HFA) 108 (90 Base) MCG/ACT inhaler

## 2019-05-23 RX ORDER — SPIRONOLACTONE 25 MG/1
25 TABLET ORAL DAILY
Qty: 90 TABLET | Refills: 1 | Status: SHIPPED | OUTPATIENT
Start: 2019-05-23 | End: 2019-05-30 | Stop reason: SDUPTHER

## 2019-05-30 RX ORDER — SPIRONOLACTONE 25 MG/1
25 TABLET ORAL DAILY
Qty: 90 TABLET | Refills: 1 | Status: SHIPPED | OUTPATIENT
Start: 2019-05-30 | End: 2020-04-20 | Stop reason: SDUPTHER

## 2019-05-30 NOTE — TELEPHONE ENCOUNTER
Medication Question/Concern    What is the name of the medication you need to speak with someone about? Doseage of the medication:    How are you taking this medication:    What issues/concerns are you having with this medication:                Medication Refill    When was your last appointment with cardiology? 3/6/19  (if 1year or longer, please schedule an appointment)    Medication needing refilled:   Losartan 100 mg  Spironolactone 25 mg    Doseage of the medication:    How are you taking this medication (QD, BID, TID, QID, PRN): QD    Patient want a 30 or 90 day supply called in:  [address] [facility] are we sending the medication to:   [facility] [address] [facility] [address] [place]    Pharmacy Phone number:    Pharmacy Fax number:

## 2019-05-31 RX ORDER — LOSARTAN POTASSIUM 100 MG/1
100 TABLET ORAL DAILY
Qty: 90 TABLET | Refills: 3 | Status: SHIPPED | OUTPATIENT
Start: 2019-05-31 | End: 2020-04-20 | Stop reason: SDUPTHER

## 2019-08-12 ENCOUNTER — HOSPITAL ENCOUNTER (OUTPATIENT)
Dept: CT IMAGING | Age: 69
Discharge: HOME | End: 2019-08-12
Payer: MEDICARE

## 2019-08-12 DIAGNOSIS — R30.0 DYSURIA: ICD-10-CM

## 2019-08-12 DIAGNOSIS — R97.20 ELEVATED PROSTATE SPECIFIC ANTIGEN (PSA): ICD-10-CM

## 2019-08-12 DIAGNOSIS — R31.0 GROSS HEMATURIA: ICD-10-CM

## 2019-08-12 PROCEDURE — 6360000004 HC RX CONTRAST MEDICATION: Performed by: UROLOGY

## 2019-08-12 PROCEDURE — 74178 CT ABD&PLV WO CNTR FLWD CNTR: CPT

## 2019-08-12 RX ADMIN — IOPAMIDOL 120 ML: 755 INJECTION, SOLUTION INTRAVENOUS at 11:07

## 2019-08-16 ENCOUNTER — TELEPHONE (OUTPATIENT)
Dept: PULMONOLOGY | Age: 69
End: 2019-08-16

## 2019-08-16 NOTE — TELEPHONE ENCOUNTER
Patient called and hasn't received machine yet. Order was originally sent from Pulmonology. I resent and talked to Kiran Ureña.  Patient has cons/cnfu scheduled for 10/28/19

## 2019-08-28 ENCOUNTER — OFFICE VISIT (OUTPATIENT)
Dept: PULMONOLOGY | Age: 69
End: 2019-08-28
Payer: MEDICARE

## 2019-08-28 VITALS
WEIGHT: 266 LBS | BODY MASS INDEX: 37.1 KG/M2 | HEART RATE: 56 BPM | RESPIRATION RATE: 18 BRPM | DIASTOLIC BLOOD PRESSURE: 68 MMHG | SYSTOLIC BLOOD PRESSURE: 108 MMHG | OXYGEN SATURATION: 96 %

## 2019-08-28 DIAGNOSIS — J44.9 COPD, MODERATE (HCC): Primary | ICD-10-CM

## 2019-08-28 DIAGNOSIS — G47.33 OSA (OBSTRUCTIVE SLEEP APNEA): ICD-10-CM

## 2019-08-28 PROCEDURE — G8598 ASA/ANTIPLAT THER USED: HCPCS | Performed by: NURSE PRACTITIONER

## 2019-08-28 PROCEDURE — 1036F TOBACCO NON-USER: CPT | Performed by: NURSE PRACTITIONER

## 2019-08-28 PROCEDURE — G8417 CALC BMI ABV UP PARAM F/U: HCPCS | Performed by: NURSE PRACTITIONER

## 2019-08-28 PROCEDURE — 99213 OFFICE O/P EST LOW 20 MIN: CPT | Performed by: NURSE PRACTITIONER

## 2019-08-28 PROCEDURE — 1123F ACP DISCUSS/DSCN MKR DOCD: CPT | Performed by: NURSE PRACTITIONER

## 2019-08-28 PROCEDURE — G8926 SPIRO NO PERF OR DOC: HCPCS | Performed by: NURSE PRACTITIONER

## 2019-08-28 PROCEDURE — 3017F COLORECTAL CA SCREEN DOC REV: CPT | Performed by: NURSE PRACTITIONER

## 2019-08-28 PROCEDURE — 3023F SPIROM DOC REV: CPT | Performed by: NURSE PRACTITIONER

## 2019-08-28 PROCEDURE — 4040F PNEUMOC VAC/ADMIN/RCVD: CPT | Performed by: NURSE PRACTITIONER

## 2019-08-28 PROCEDURE — G8427 DOCREV CUR MEDS BY ELIG CLIN: HCPCS | Performed by: NURSE PRACTITIONER

## 2019-08-28 RX ORDER — ALBUTEROL SULFATE 90 UG/1
2 AEROSOL, METERED RESPIRATORY (INHALATION) EVERY 4 HOURS PRN
Qty: 1 INHALER | Refills: 5 | Status: SHIPPED | OUTPATIENT
Start: 2019-08-28 | End: 2019-10-23 | Stop reason: SDUPTHER

## 2019-08-28 ASSESSMENT — SLEEP AND FATIGUE QUESTIONNAIRES
NECK CIRCUMFERENCE (INCHES): 15.5
HOW LIKELY ARE YOU TO NOD OFF OR FALL ASLEEP WHILE WATCHING TV: 2
ESS TOTAL SCORE: 5
HOW LIKELY ARE YOU TO NOD OFF OR FALL ASLEEP WHILE SITTING AND READING: 1
HOW LIKELY ARE YOU TO NOD OFF OR FALL ASLEEP WHILE SITTING AND TALKING TO SOMEONE: 0
HOW LIKELY ARE YOU TO NOD OFF OR FALL ASLEEP WHEN YOU ARE A PASSENGER IN A CAR FOR AN HOUR WITHOUT A BREAK: 0
HOW LIKELY ARE YOU TO NOD OFF OR FALL ASLEEP WHILE LYING DOWN TO REST IN THE AFTERNOON WHEN CIRCUMSTANCES PERMIT: 2
HOW LIKELY ARE YOU TO NOD OFF OR FALL ASLEEP IN A CAR, WHILE STOPPED FOR A FEW MINUTES IN TRAFFIC: 0
HOW LIKELY ARE YOU TO NOD OFF OR FALL ASLEEP WHILE SITTING INACTIVE IN A PUBLIC PLACE: 0
HOW LIKELY ARE YOU TO NOD OFF OR FALL ASLEEP WHILE SITTING QUIETLY AFTER LUNCH WITHOUT ALCOHOL: 0

## 2019-08-28 ASSESSMENT — ENCOUNTER SYMPTOMS
CONSTIPATION: 0
COUGH: 1
COLOR CHANGE: 0
ABDOMINAL PAIN: 0
SHORTNESS OF BREATH: 1

## 2019-08-28 NOTE — PROGRESS NOTES
[facility]    Subjective:   CHIEF COMPLAINT / HPI: COPD   The patient is 77 y.o. male who presents today for a routine follow up visit related to the above mentioned issues. There is a PMH significant for other conditions including DM, dysphagia, HTN, KARYNA. He was last evaluated in May and was experiencing an episode of acute bronchitis at that time. He was treated with Doxy and Prednisone. Presently he reports SOB is at baseline. He notes that SOB is exacerbated heat and walking long distances. He does cough. Sometimes he brings up sputum which is yellow. He does report daytime sleepiness for many years. He reports compliance with Anoro, SYMONE, Duoneb.         Past Medical History:   Diagnosis Date    [facility]chronic obstructive pulmonary disease) [facility] [address]     Coronary artery disease involving native coronary artery of native heart with angina pectoris ([facility] [address] 2017    Depression     Diabetes mellitus (HCC)     type II    Dysphagia     Enlarged prostate     Heart enlarged     Hyperlipidemia     Hypertension     MRSA (methicillin resistant staph aureus) culture positive 2018    sputum    Obesity     Pneumonia     multiple times     Sleep apnea     has cpap but hasn't used \"in a long time\"     Social History:    Social History     Tobacco Use   Smoking Status Former Smoker    Packs/day: 0.25    Years: 25.00    Pack years: 6.25    Types: Cigarettes, Pipe, Cigars    Last attempt to quit: 2011    Years since quittin.6   Smokeless Tobacco Former User   Tobacco Comment    more than 5 cigars per day     Current Medications:     Current Outpatient Medications on File Prior to Visit   Medication Sig Dispense Refill    losartan (COZAAR) 100 MG tablet Take 1 tablet by mouth daily 90 tablet 3    spironolactone (ALDACTONE) 25 MG tablet Take 1 tablet by mouth daily 90 tablet 1    ferrous sulfate 325 (65 Fe) MG tablet Take 325 mg by mouth daily (with breakfast)  doxazosin (CARDURA) 4 MG tablet Take 1 tablet by mouth nightly 90 tablet 1    furosemide (LASIX) 40 MG tablet Take 1 tablet by mouth daily 90 tablet 3    ticagrelor (BRILINTA) 90 MG TABS tablet Take 1 tablet by mouth 2 times daily 180 tablet 3    nitroGLYCERIN (NITROSTAT) 0.4 MG SL tablet Place 1 tablet under the tongue every 5 minutes as needed for Chest pain 25 tablet 1    metoprolol succinate (TOPROL XL) 50 MG extended release tablet Take 1 tablet by mouth daily 90 tablet 3    ipratropium-albuterol (DUONEB) 0.5-2.5 (3) MG/3ML SOLN nebulizer solution Inhale 3 mLs into the lungs every 6 hours as needed for Shortness of Breath 360 mL 3    Nebulizers (COMPRESSOR/NEBULIZER) MISC 1 Units by Does not apply route every 6 hours as needed (sob) 1 each 3    digoxin (LANOXIN) 125 MCG tablet Take 62.5 mcg by mouth daily       aspirin 81 MG chewable tablet Take 81 mg by mouth daily      clonazePAM (KLONOPIN) 1 MG tablet Take 1 mg by mouth 2 times daily as needed for Anxiety      metFORMIN (GLUCOPHAGE) 500 MG tablet Take 500 mg by mouth 2 times daily (with meals)      simvastatin (ZOCOR) 40 MG tablet Take 0.5 tablets by mouth nightly 30 tablet 3     No current facility-administered medications on file prior to visit. Review of Systems   Constitutional: Negative for chills and fever. HENT: Negative for congestion and postnasal drip. Respiratory: Positive for cough and shortness of breath. Cardiovascular: Negative for chest pain and leg swelling. Gastrointestinal: Negative for abdominal pain and constipation. Musculoskeletal: Negative for arthralgias and joint swelling. Skin: Negative for color change and pallor. Allergic/Immunologic: Negative for environmental allergies and food allergies. Psychiatric/Behavioral: Negative for agitation and confusion.      Objective:       VITALS:  /68   Pulse 56   Resp 18   Wt 266 lb (120.7 kg)   SpO2 96%   BMI 37.10 kg/m²  on RA    Physical

## 2019-09-04 ENCOUNTER — OFFICE VISIT (OUTPATIENT)
Dept: CARDIOLOGY CLINIC | Age: 69
End: 2019-09-04
Payer: MEDICARE

## 2019-09-04 ENCOUNTER — TELEPHONE (OUTPATIENT)
Dept: CARDIOLOGY CLINIC | Age: 69
End: 2019-09-04

## 2019-09-04 VITALS
OXYGEN SATURATION: 94 % | HEART RATE: 76 BPM | WEIGHT: 264 LBS | HEIGHT: 71 IN | SYSTOLIC BLOOD PRESSURE: 120 MMHG | BODY MASS INDEX: 36.96 KG/M2 | DIASTOLIC BLOOD PRESSURE: 80 MMHG

## 2019-09-04 DIAGNOSIS — R06.02 SOB (SHORTNESS OF BREATH): ICD-10-CM

## 2019-09-04 DIAGNOSIS — R00.2 HEART PALPITATIONS: ICD-10-CM

## 2019-09-04 DIAGNOSIS — I25.119 CORONARY ARTERY DISEASE INVOLVING NATIVE CORONARY ARTERY OF NATIVE HEART WITH ANGINA PECTORIS (HCC): ICD-10-CM

## 2019-09-04 DIAGNOSIS — I10 ESSENTIAL HYPERTENSION, BENIGN: ICD-10-CM

## 2019-09-04 DIAGNOSIS — E78.2 MIXED HYPERLIPIDEMIA: ICD-10-CM

## 2019-09-04 PROCEDURE — 3017F COLORECTAL CA SCREEN DOC REV: CPT | Performed by: NURSE PRACTITIONER

## 2019-09-04 PROCEDURE — 1036F TOBACCO NON-USER: CPT | Performed by: NURSE PRACTITIONER

## 2019-09-04 PROCEDURE — G8417 CALC BMI ABV UP PARAM F/U: HCPCS | Performed by: NURSE PRACTITIONER

## 2019-09-04 PROCEDURE — 4040F PNEUMOC VAC/ADMIN/RCVD: CPT | Performed by: NURSE PRACTITIONER

## 2019-09-04 PROCEDURE — 99214 OFFICE O/P EST MOD 30 MIN: CPT | Performed by: NURSE PRACTITIONER

## 2019-09-04 PROCEDURE — 1123F ACP DISCUSS/DSCN MKR DOCD: CPT | Performed by: NURSE PRACTITIONER

## 2019-09-04 PROCEDURE — G8427 DOCREV CUR MEDS BY ELIG CLIN: HCPCS | Performed by: NURSE PRACTITIONER

## 2019-09-04 PROCEDURE — 93000 ELECTROCARDIOGRAM COMPLETE: CPT | Performed by: NURSE PRACTITIONER

## 2019-09-04 PROCEDURE — G8598 ASA/ANTIPLAT THER USED: HCPCS | Performed by: NURSE PRACTITIONER

## 2019-09-04 RX ORDER — DOXAZOSIN MESYLATE 4 MG/1
4 TABLET ORAL NIGHTLY
Qty: 90 TABLET | Refills: 2 | Status: SHIPPED | OUTPATIENT
Start: 2019-09-04 | End: 2020-06-03 | Stop reason: SDUPTHER

## 2019-09-04 NOTE — LETTER
[address] [facility] [place], [place] [address] [address]  Phone: [phone]  Fax: [phone]    ALEXEI Sanchez - TORIN        2019     [place], [address] [place] [address]    Patient: Maurice Villatoro  MR Number: [id number]  YOB: 1950  Date of Visit: 2019    Dear Dr. Gu:            ArvinShania     Outpatient Follow Up Wilfredo Pickard / HPI:    Follow Up secondary to Hypertension/ CAD/ cardiomyopathy/ and hyperlipidemia   He has hx of Hypertension/ CAD/ cardiomyopathy/ and hyperlipidemia   He had not had any hospital admissions for heart failure. He is not having any chest pain, SOB remains the same,, edema. He states at times he feels like his heart is fluttering, lasts few minutes, no other symptoms.         Past Medical History:   Diagnosis Date    [facility] obstructive [facility] disease) [facility] [address]     Coronary artery disease involving native coronary artery of native heart with angina pectoris ([facility] [address] 2017    Depression     Diabetes mellitus ([address]     type II    Dysphagia     Enlarged prostate     Heart enlarged     Hyperlipidemia     Hypertension     MRSA (methicillin resistant staph aureus) culture positive 2018    sputum    Obesity     Pneumonia     multiple times     Sleep apnea     has cpap but hasn't used \"in a long time\"     Social History:    Social History     Tobacco Use   Smoking Status Former Smoker    Packs/day: 0.25    Years: 25.00    Pack years: 6.25    Types: Cigarettes, Pipe, Cigars    Last attempt to quit: 2011    Years since quittin.6   Smokeless Tobacco Former User   Tobacco Comment    more than 5 cigars per day     Current Medications:  Current Outpatient Medications   Medication Sig Dispense Refill    umeclidinium-vilanterol (ANORO ELLIPTA) 62.5-25 MCG/INH AEPB inhaler Inhale 1 puff into the lungs daily 1 each 5

## 2019-09-04 NOTE — PROGRESS NOTES
symptoms. PSYCHIATRIC: No anxiety, pain, insomnia or depression    Objective:   PHYSICAL EXAM:        VITALS:    Vitals:    09/04/19 1024   BP: 120/80   Pulse: 76   SpO2: 94%     CONSTITUTIONAL: Cooperative, no apparent distress, and appears well nourished / developed  NEUROLOGIC:  Awake and orientated to person, place and time. PSYCH: Calm affect. SKIN: Warm and dry. HEENT: Sclera non-icteric, normocephalic, neck supple, no elevation of JVP, normal carotid pulses with no bruits and thyroid normal size. LUNGS:  No increased work of breathing and clear to auscultation, no crackles or wheezing. CARDIOVASCULAR:  Regular rate and rhythm with no murmurs, gallops, rubs, or abnormal heart sounds, normal PMI. The apical impulses not displaced. Heart tones are crisp and normal    Cervical veins are not engorged                 JVP less than 8 cm H2O                                                     The carotid upstroke is normal in amplitude and contour without delay or bruit    ABDOMEN:  Normal bowel sounds, non-distended and non-tender to palpation   EXT: No edema, no calf tenderness. Pulses are present bilaterally.     DATA:    Lab Results   Component Value Date    ALT 23 12/11/2018    AST 14 (A) 12/11/2018    ALKPHOS 101 08/18/2018    BILITOT 0.6 08/18/2018     Lab Results   Component Value Date    CREATININE 0.9 08/18/2018    BUN 20 (A) 12/11/2018     12/11/2018    K 3.8 12/11/2018     12/11/2018    CO2 27 12/11/2018     Lab Results   Component Value Date    TSH 2.40 08/02/2012    I5RFMKX 8.8 09/28/2011     Lab Results   Component Value Date    WBC 8.9 08/18/2018    HGB 11.5 (L) 08/18/2018    HCT 35.3 (L) 08/18/2018    MCV 70.6 (L) 08/18/2018     08/18/2018     No components found for: CHLPL  Lab Results   Component Value Date    TRIG 55 12/11/2018    TRIG 89 09/20/2017    TRIG 51 08/07/2015     Lab Results   Component Value Date    HDL 36 (A) 12/11/2018    HDL 35 (A) 09/20/2017    HDL 33 (A) lbs in one week; swelling, SOB/orthopnea/PND    Dual Antiplatelet therapy has been prescribed for this patient. Education conducted on adverse reactions including bleeding was discussed. The patient verbalizes understanding. Pt is on a BB  Pt is on an ace-i/ARB  Pt is on a statin    Saturated fat diet discussed  Exercise program discussed    Thank you for allowing to us to participate in the care of Tigre Teran.     [address] [place] [address]

## 2019-09-10 ENCOUNTER — HOSPITAL ENCOUNTER (EMERGENCY)
Age: 69
Discharge: HOME | End: 2019-09-10
Attending: EMERGENCY MEDICINE
Payer: MEDICARE

## 2019-09-10 ENCOUNTER — APPOINTMENT (OUTPATIENT)
Dept: GENERAL RADIOLOGY | Age: 69
End: 2019-09-10
Payer: MEDICARE

## 2019-09-10 VITALS
DIASTOLIC BLOOD PRESSURE: 86 MMHG | HEIGHT: 71 IN | OXYGEN SATURATION: 96 % | TEMPERATURE: 98 F | BODY MASS INDEX: 36.4 KG/M2 | HEART RATE: 80 BPM | SYSTOLIC BLOOD PRESSURE: 148 MMHG | RESPIRATION RATE: 22 BRPM | WEIGHT: 260 LBS

## 2019-09-10 DIAGNOSIS — J44.1 COPD WITH ACUTE EXACERBATION (HCC): Primary | ICD-10-CM

## 2019-09-10 LAB
A/G RATIO: 1.1 (ref 1.1–2.2)
ALBUMIN SERPL-MCNC: 3.9 G/DL (ref 3.4–5)
ALP BLD-CCNC: 96 U/L (ref 40–129)
ALT SERPL-CCNC: 13 U/L (ref 10–40)
ANION GAP SERPL CALCULATED.3IONS-SCNC: 10 MMOL/L (ref 3–16)
AST SERPL-CCNC: 19 U/L (ref 15–37)
BASOPHILS ABSOLUTE: 0 K/UL (ref 0–0.2)
BASOPHILS RELATIVE PERCENT: 0.6 %
BILIRUB SERPL-MCNC: 0.5 MG/DL (ref 0–1)
BUN BLDV-MCNC: 22 MG/DL (ref 7–20)
CALCIUM SERPL-MCNC: 9.2 MG/DL (ref 8.3–10.6)
CHLORIDE BLD-SCNC: 103 MMOL/L (ref 99–110)
CO2: 22 MMOL/L (ref 21–32)
CREAT SERPL-MCNC: 0.9 MG/DL (ref 0.8–1.3)
EOSINOPHILS ABSOLUTE: 0.2 K/UL (ref 0–0.6)
EOSINOPHILS RELATIVE PERCENT: 3.2 %
GFR AFRICAN AMERICAN: >60
GFR NON-AFRICAN AMERICAN: >60
GLOBULIN: 3.7 G/DL
GLUCOSE BLD-MCNC: 107 MG/DL (ref 70–99)
HCT VFR BLD CALC: 40 % (ref 40.5–52.5)
HEMOGLOBIN: 13 G/DL (ref 13.5–17.5)
LACTIC ACID: 1.3 MMOL/L (ref 0.4–2)
LYMPHOCYTES ABSOLUTE: 0.9 K/UL (ref 1–5.1)
LYMPHOCYTES RELATIVE PERCENT: 12.3 %
MCH RBC QN AUTO: 25 PG (ref 26–34)
MCHC RBC AUTO-ENTMCNC: 32.6 G/DL (ref 31–36)
MCV RBC AUTO: 76.8 FL (ref 80–100)
MONOCYTES ABSOLUTE: 0.5 K/UL (ref 0–1.3)
MONOCYTES RELATIVE PERCENT: 6.7 %
NEUTROPHILS ABSOLUTE: 5.5 K/UL (ref 1.7–7.7)
NEUTROPHILS RELATIVE PERCENT: 77.2 %
PDW BLD-RTO: 16.7 % (ref 12.4–15.4)
PLATELET # BLD: 164 K/UL (ref 135–450)
PMV BLD AUTO: 8.2 FL (ref 5–10.5)
POTASSIUM SERPL-SCNC: 3.8 MMOL/L (ref 3.5–5.1)
PRO-BNP: 679 PG/ML (ref 0–124)
RBC # BLD: 5.21 M/UL (ref 4.2–5.9)
SODIUM BLD-SCNC: 135 MMOL/L (ref 136–145)
TOTAL PROTEIN: 7.6 G/DL (ref 6.4–8.2)
TROPONIN: <0.01 NG/ML
WBC # BLD: 7.1 K/UL (ref 4–11)

## 2019-09-10 PROCEDURE — 94760 N-INVAS EAR/PLS OXIMETRY 1: CPT

## 2019-09-10 PROCEDURE — 99284 EMERGENCY DEPT VISIT MOD MDM: CPT

## 2019-09-10 PROCEDURE — 87040 BLOOD CULTURE FOR BACTERIA: CPT

## 2019-09-10 PROCEDURE — 6360000002 HC RX W HCPCS: Performed by: PHYSICIAN ASSISTANT

## 2019-09-10 PROCEDURE — 2700000000 HC OXYGEN THERAPY PER DAY

## 2019-09-10 PROCEDURE — 6370000000 HC RX 637 (ALT 250 FOR IP): Performed by: PHYSICIAN ASSISTANT

## 2019-09-10 PROCEDURE — 71046 X-RAY EXAM CHEST 2 VIEWS: CPT

## 2019-09-10 PROCEDURE — 83605 ASSAY OF LACTIC ACID: CPT

## 2019-09-10 PROCEDURE — 85025 COMPLETE CBC W/AUTO DIFF WBC: CPT

## 2019-09-10 PROCEDURE — 96374 THER/PROPH/DIAG INJ IV PUSH: CPT

## 2019-09-10 PROCEDURE — 84484 ASSAY OF TROPONIN QUANT: CPT

## 2019-09-10 PROCEDURE — 83880 ASSAY OF NATRIURETIC PEPTIDE: CPT

## 2019-09-10 PROCEDURE — 94640 AIRWAY INHALATION TREATMENT: CPT

## 2019-09-10 PROCEDURE — 80053 COMPREHEN METABOLIC PANEL: CPT

## 2019-09-10 PROCEDURE — 93005 ELECTROCARDIOGRAM TRACING: CPT | Performed by: EMERGENCY MEDICINE

## 2019-09-10 RX ORDER — DOXYCYCLINE 100 MG/1
100 TABLET ORAL 2 TIMES DAILY
Qty: 20 TABLET | Refills: 0 | Status: SHIPPED | OUTPATIENT
Start: 2019-09-10 | End: 2019-09-20

## 2019-09-10 RX ORDER — METHYLPREDNISOLONE SODIUM SUCCINATE 125 MG/2ML
125 INJECTION, POWDER, LYOPHILIZED, FOR SOLUTION INTRAMUSCULAR; INTRAVENOUS ONCE
Status: COMPLETED | OUTPATIENT
Start: 2019-09-10 | End: 2019-09-10

## 2019-09-10 RX ORDER — DOXYCYCLINE HYCLATE 100 MG
100 TABLET ORAL ONCE
Status: COMPLETED | OUTPATIENT
Start: 2019-09-10 | End: 2019-09-10

## 2019-09-10 RX ORDER — IPRATROPIUM BROMIDE AND ALBUTEROL SULFATE 2.5; .5 MG/3ML; MG/3ML
1 SOLUTION RESPIRATORY (INHALATION) ONCE
Status: COMPLETED | OUTPATIENT
Start: 2019-09-10 | End: 2019-09-10

## 2019-09-10 RX ORDER — ALBUTEROL SULFATE 2.5 MG/3ML
5 SOLUTION RESPIRATORY (INHALATION) ONCE
Status: COMPLETED | OUTPATIENT
Start: 2019-09-10 | End: 2019-09-10

## 2019-09-10 RX ORDER — PREDNISONE 10 MG/1
60 TABLET ORAL DAILY
Qty: 30 TABLET | Refills: 0 | Status: SHIPPED | OUTPATIENT
Start: 2019-09-10 | End: 2019-09-15

## 2019-09-10 RX ADMIN — ALBUTEROL SULFATE 5 MG: 2.5 SOLUTION RESPIRATORY (INHALATION) at 13:11

## 2019-09-10 RX ADMIN — DOXYCYCLINE HYCLATE 100 MG: 100 TABLET, COATED ORAL at 13:05

## 2019-09-10 RX ADMIN — IPRATROPIUM BROMIDE AND ALBUTEROL SULFATE 1 AMPULE: .5; 3 SOLUTION RESPIRATORY (INHALATION) at 12:05

## 2019-09-10 RX ADMIN — METHYLPREDNISOLONE SODIUM SUCCINATE 125 MG: 125 INJECTION, POWDER, FOR SOLUTION INTRAMUSCULAR; INTRAVENOUS at 12:04

## 2019-09-10 ASSESSMENT — ENCOUNTER SYMPTOMS
TROUBLE SWALLOWING: 0
SINUS PRESSURE: 0
BACK PAIN: 0
NAUSEA: 0
COUGH: 1
WHEEZING: 1
ABDOMINAL DISTENTION: 0
STRIDOR: 0
SHORTNESS OF BREATH: 1
DIARRHEA: 0
FACIAL SWELLING: 0
VOMITING: 0
CONSTIPATION: 0
COLOR CHANGE: 0
SORE THROAT: 0
VOICE CHANGE: 0
SINUS PAIN: 0
ABDOMINAL PAIN: 0

## 2019-09-10 NOTE — ED PROVIDER NOTES
[address] [facility]        Pt Name: Guerda Hernandez  MRN: [id number]  [place] 1950  Date of evaluation: 9/10/2019  Provider: Rich Keith PA-C  PCP: Solitario Nagy MD    This patient was seen and evaluated by the attending physician Dr. Silverio Jiang   Patient presents with    Cough     pt has had a productive cough and SOB for the past several days. States he has been having yellow phlegm, denies fever       HISTORY OF PRESENT ILLNESS   (Location/Symptom, Timing/Onset, Context/Setting, Quality, Duration, Modifying Factors, Severity)  Note limiting factors. Guerda Hernandez is a 77 y.o. male who presents to the emergency department complaining of cough, congestion and shortness of breath for several days. Denies fever or chills but feels very fatigued. He is unable to walk a few steps without becoming more short of breath. He does have history of COPD and pneumonia. Does not normally wear additional oxygen. Denies chest pain, hemoptysis, palpitations, abdominal pain, nausea, vomiting, unexpected weight gain or weight loss, pain or swelling in extremities. No stridor, tripoding or drooling is noted. He does have rescue inhaler and nebulizer at home which is not helping. Dr. Lavell Delgadillo is his pulmonologist.    Nursing Notes were all reviewed and agreed with or any disagreements were addressed  in the HPI. REVIEW OF SYSTEMS    (2-9 systems for level 4, 10 or more for level 5)     Review of Systems   Constitutional: Positive for fatigue. Negative for chills and fever. HENT: Positive for congestion. Negative for drooling, ear discharge, ear pain, facial swelling, sinus pressure, sinus pain, sneezing, sore throat, tinnitus, trouble swallowing and voice change. Eyes: Negative for visual disturbance. Respiratory: Positive for cough, shortness of breath and wheezing.  Negative for Anxiety    DIGOXIN (LANOXIN) 125 MCG TABLET    Take 62.5 mcg by mouth daily     DOXAZOSIN (CARDURA) 4 MG TABLET    Take 1 tablet by mouth nightly    FERROUS SULFATE 325 (65 FE) MG TABLET    Take 325 mg by mouth daily (with breakfast)    FUROSEMIDE (LASIX) 40 MG TABLET    Take 1 tablet by mouth daily    IPRATROPIUM-ALBUTEROL (DUONEB) 0.5-2.5 (3) MG/3ML SOLN NEBULIZER SOLUTION    Inhale 3 mLs into the lungs every 6 hours as needed for Shortness of Breath    LOSARTAN (COZAAR) 100 MG TABLET    Take 1 tablet by mouth daily    METFORMIN (GLUCOPHAGE) 500 MG TABLET    Take 500 mg by mouth 2 times daily (with meals)    METOPROLOL SUCCINATE (TOPROL XL) 50 MG EXTENDED RELEASE TABLET    Take 1 tablet by mouth daily    NEBULIZERS (COMPRESSOR/NEBULIZER) MISC    1 Units by Does not apply route every 6 hours as needed (sob)    NITROGLYCERIN (NITROSTAT) 0.4 MG SL TABLET    Place 1 tablet under the tongue every 5 minutes as needed for Chest pain    ROFLUMILAST (DALIRESP) 500 MCG TABLET    Take 1 tablet by mouth daily    SIMVASTATIN (ZOCOR) 40 MG TABLET    Take 0.5 tablets by mouth nightly    SPIRONOLACTONE (ALDACTONE) 25 MG TABLET    Take 1 tablet by mouth daily    TICAGRELOR (BRILINTA) 90 MG TABS TABLET    Take 1 tablet by mouth 2 times daily    UMECLIDINIUM-VILANTEROL (ANORO ELLIPTA) 62.5-25 MCG/INH AEPB INHALER    Inhale 1 puff into the lungs daily         ALLERGIES     Lisinopril    FAMILYHISTORY       Family History   Problem Relation Age of Onset    COPD Mother     Heart Disease Father     COPD Father           SOCIAL HISTORY       Social History     Socioeconomic History    Marital status: Single     Spouse name: None    Number of children: None    Years of education: None    Highest education level: None   Occupational History    None   Social Needs    Financial resource strain: None    Food insecurity:     Worry: None     Inability: None    Transportation needs:     Medical: None     Non-medical: None   Tobacco Phone [phone]   LACTIC ACID, PLASMA       All other labs were within normal range or not returned as of this dictation. EKG: All EKG's are interpreted by the Emergency Department Physician in the absence of a cardiologist.  Please see their note for interpretation of EKG. RADIOLOGY:   Non-plain film images such as CT, Ultrasound and MRI are read by the radiologist. Plain radiographic images are visualized andpreliminarily interpreted by the  ED Provider with the below findings:        Interpretation perthe Radiologist below, if available at the time of this note:    XR CHEST STANDARD (2 VW)   Final Result   No acute findings                 PROCEDURES   Unless otherwise noted below, none     Procedures    CRITICAL CARE TIME   N/A    CONSULTS:  None      EMERGENCY DEPARTMENT COURSE and DIFFERENTIAL DIAGNOSIS/MDM:   Vitals:    Vitals:    09/10/19 1116   BP: (!) 149/80   Pulse: 69   Resp: 20   Temp: 98 °F (36.7 °C)   TempSrc: Oral   SpO2: 95%   Weight: 260 lb (117.9 kg)   Height: 5' 11\" (1.803 m)       Patient was given thefollowing medications:  Medications   doxycycline hyclate (VIBRA-TABS) tablet 100 mg (has no administration in time range)   albuterol (PROVENTIL) nebulizer solution 5 mg (has no administration in time range)   ipratropium-albuterol (DUONEB) nebulizer solution 1 ampule (1 ampule Inhalation Given 9/10/19 1205)   methylPREDNISolone sodium (SOLU-MEDROL) injection 125 mg (125 mg Intravenous Given 9/10/19 1204)     This patient presents to the emergency department with complaints of cough, congestion, shortness of breath. He quit smoking approximately 14 years ago but does have history of COPD and pneumonia. Chest x-ray does not show any evidence of pneumonia. He is afebrile without leukocytosis or lactic acidosis. Blood work is otherwise unremarkable. EKG stable at this time. He is feeling much better after receiving breathing treatments and lung sounds are improved.   He received

## 2019-09-11 LAB
EKG ATRIAL RATE: 76 BPM
EKG DIAGNOSIS: NORMAL
EKG P-R INTERVAL: 112 MS
EKG Q-T INTERVAL: 390 MS
EKG QRS DURATION: 80 MS
EKG QTC CALCULATION (BAZETT): 438 MS
EKG R AXIS: -23 DEGREES
EKG T AXIS: 57 DEGREES
EKG VENTRICULAR RATE: 76 BPM

## 2019-09-11 PROCEDURE — 93010 ELECTROCARDIOGRAM REPORT: CPT | Performed by: INTERNAL MEDICINE

## 2019-09-13 ENCOUNTER — OFFICE VISIT (OUTPATIENT)
Dept: PULMONOLOGY | Age: 69
End: 2019-09-13
Payer: MEDICARE

## 2019-09-13 VITALS
BODY MASS INDEX: 37.52 KG/M2 | SYSTOLIC BLOOD PRESSURE: 122 MMHG | DIASTOLIC BLOOD PRESSURE: 81 MMHG | WEIGHT: 269 LBS | HEART RATE: 59 BPM | OXYGEN SATURATION: 98 %

## 2019-09-13 DIAGNOSIS — R04.2 HEMOPTYSIS: ICD-10-CM

## 2019-09-13 DIAGNOSIS — J44.9 COPD, SEVERE (HCC): ICD-10-CM

## 2019-09-13 DIAGNOSIS — I25.5 ISCHEMIC CARDIOMYOPATHY: ICD-10-CM

## 2019-09-13 DIAGNOSIS — R06.02 SOB (SHORTNESS OF BREATH): Primary | ICD-10-CM

## 2019-09-13 DIAGNOSIS — R05.3 CHRONIC COUGH: ICD-10-CM

## 2019-09-13 PROCEDURE — 4040F PNEUMOC VAC/ADMIN/RCVD: CPT | Performed by: INTERNAL MEDICINE

## 2019-09-13 PROCEDURE — G8926 SPIRO NO PERF OR DOC: HCPCS | Performed by: INTERNAL MEDICINE

## 2019-09-13 PROCEDURE — 3023F SPIROM DOC REV: CPT | Performed by: INTERNAL MEDICINE

## 2019-09-13 PROCEDURE — G8427 DOCREV CUR MEDS BY ELIG CLIN: HCPCS | Performed by: INTERNAL MEDICINE

## 2019-09-13 PROCEDURE — 1036F TOBACCO NON-USER: CPT | Performed by: INTERNAL MEDICINE

## 2019-09-13 PROCEDURE — G8417 CALC BMI ABV UP PARAM F/U: HCPCS | Performed by: INTERNAL MEDICINE

## 2019-09-13 PROCEDURE — 3017F COLORECTAL CA SCREEN DOC REV: CPT | Performed by: INTERNAL MEDICINE

## 2019-09-13 PROCEDURE — 99214 OFFICE O/P EST MOD 30 MIN: CPT | Performed by: INTERNAL MEDICINE

## 2019-09-13 PROCEDURE — G8598 ASA/ANTIPLAT THER USED: HCPCS | Performed by: INTERNAL MEDICINE

## 2019-09-13 PROCEDURE — 1123F ACP DISCUSS/DSCN MKR DOCD: CPT | Performed by: INTERNAL MEDICINE

## 2019-09-13 NOTE — PROGRESS NOTES
was the last time he did this    Cough       HPI  The patient presents with a chief complaint of shortness of breath and cough. He was in the ER 9/10 with a flare up and hemoptysis. He is on Abx and steroids and starting to feel a little better. He had hemoptysis with an episode of broncitis 4/18 and we did bronch which was negative. Last CT chest was 4/18 . No Chest pain, Nausea or vomiting reported    Review of Systems  As documented in HPI     Physical Exam:  Well developed, well nourished  Alert and oriented  Sclera is clear  No cervical adenopathy  No JVD. Chest examination is clear. Cardiac examination reveals regular rate and rhythm without murmur, gallop or rub. The abdomen is soft, nontender and nondistended. There is no clubbing, cyanosis or edema of the extremities. There is no obvious skin rash. No focal neuro deficicts  Normal mood and affect    Allergies   Allergen Reactions    Lisinopril Other (See Comments)     COUGH     Prior to Visit Medications    Medication Sig Taking?  Authorizing Provider   doxycycline monohydrate (ADOXA) 100 MG tablet Take 1 tablet by mouth 2 times daily for 10 days  Lora Calderon PA-C   predniSONE (DELTASONE) 10 MG tablet Take 6 tablets by mouth daily for 5 doses  Lora Calderon PA-C   doxazosin (CARDURA) 4 MG tablet Take 1 tablet by mouth nightly  ALEXEI Marks CNP   umeclidinium-vilanterol (ANORO ELLIPTA) 62.5-25 MCG/INH AEPB inhaler Inhale 1 puff into the lungs daily  ALEXEI López CNP   albuterol sulfate HFA (PROVENTIL HFA) 108 (90 Base) MCG/ACT inhaler Inhale 2 puffs into the lungs every 4 hours as needed for Wheezing  ALEXEI López CNP   Roflumilast (DALIRESP) 500 MCG tablet Take 1 tablet by mouth daily  ALEXEI López CNP   losartan (COZAAR) 100 MG tablet Take 1 tablet by mouth daily  ALEXEI Marks CNP   spironolactone (ALDACTONE) 25 MG tablet Take 1 tablet by

## 2019-09-15 LAB
BLOOD CULTURE, ROUTINE: NORMAL
CULTURE, BLOOD 2: NORMAL

## 2019-09-26 ENCOUNTER — HOSPITAL ENCOUNTER (OUTPATIENT)
Dept: CT IMAGING | Age: 69
Discharge: HOME | End: 2019-09-26
Payer: MEDICARE

## 2019-09-26 ENCOUNTER — HOSPITAL ENCOUNTER (OUTPATIENT)
Dept: PULMONOLOGY | Age: 69
Discharge: HOME | End: 2019-09-26
Payer: MEDICARE

## 2019-09-26 VITALS — OXYGEN SATURATION: 96 % | RESPIRATION RATE: 16 BRPM | HEART RATE: 72 BPM

## 2019-09-26 DIAGNOSIS — J44.9 COPD, SEVERE (HCC): ICD-10-CM

## 2019-09-26 DIAGNOSIS — R04.2 HEMOPTYSIS: ICD-10-CM

## 2019-09-26 LAB
DLCO %PRED: 92 %
DLCO PRED: NORMAL ML/MIN/MMHG
DLCO/VA %PRED: NORMAL %
DLCO/VA PRED: NORMAL ML/MIN/MMHG
DLCO/VA: NORMAL ML/MIN/MMHG
DLCO: NORMAL ML/MIN/MMHG
EXPIRATORY TIME-POST: NORMAL SEC
EXPIRATORY TIME: NORMAL SEC
FEF 25-75% %CHNG: NORMAL
FEF 25-75% %PRED-POST: NORMAL %
FEF 25-75% %PRED-PRE: NORMAL L/SEC
FEF 25-75% PRED: NORMAL L/SEC
FEF 25-75%-POST: NORMAL L/SEC
FEF 25-75%-PRE: NORMAL L/SEC
FEV1 %PRED-POST: 68 %
FEV1 %PRED-PRE: 59 %
FEV1 PRED: NORMAL L
FEV1-POST: NORMAL L
FEV1-PRE: NORMAL L
FEV1/FVC %PRED-POST: NORMAL %
FEV1/FVC %PRED-PRE: NORMAL %
FEV1/FVC PRED: NORMAL %
FEV1/FVC-POST: 104 %
FEV1/FVC-PRE: 96 %
FVC %PRED-POST: NORMAL L
FVC %PRED-PRE: NORMAL %
FVC PRED: NORMAL L
FVC-POST: NORMAL L
FVC-PRE: NORMAL L
GAW %PRED: NORMAL %
GAW PRED: NORMAL L/S/CMH2O
GAW: NORMAL L/S/CMH2O
IC %PRED: NORMAL %
IC PRED: NORMAL L
IC: NORMAL L
MEP: NORMAL
MIP: NORMAL
MVV %PRED-PRE: NORMAL %
MVV PRED: NORMAL L/MIN
MVV-PRE: NORMAL L/MIN
PEF %PRED-POST: NORMAL %
PEF %PRED-PRE: NORMAL L/SEC
PEF PRED: NORMAL L/SEC
PEF%CHNG: NORMAL
PEF-POST: NORMAL L/SEC
PEF-PRE: NORMAL L/SEC
RAW %PRED: NORMAL %
RAW PRED: NORMAL CMH2O/L/S
RAW: NORMAL CMH2O/L/S
RV %PRED: NORMAL %
RV PRED: NORMAL L
RV: NORMAL L
SVC %PRED: NORMAL %
SVC PRED: NORMAL L
SVC: NORMAL L
TLC %PRED: 119 %
TLC PRED: NORMAL L
TLC: NORMAL L
VA %PRED: NORMAL %
VA PRED: NORMAL L
VA: NORMAL L
VTG %PRED: NORMAL %
VTG PRED: NORMAL L
VTG: NORMAL L

## 2019-09-26 PROCEDURE — 94726 PLETHYSMOGRAPHY LUNG VOLUMES: CPT

## 2019-09-26 PROCEDURE — 71250 CT THORAX DX C-: CPT

## 2019-09-26 PROCEDURE — 94760 N-INVAS EAR/PLS OXIMETRY 1: CPT

## 2019-09-26 PROCEDURE — 94060 EVALUATION OF WHEEZING: CPT

## 2019-09-26 PROCEDURE — 94200 LUNG FUNCTION TEST (MBC/MVV): CPT

## 2019-09-26 PROCEDURE — 6370000000 HC RX 637 (ALT 250 FOR IP): Performed by: INTERNAL MEDICINE

## 2019-09-26 PROCEDURE — 94729 DIFFUSING CAPACITY: CPT

## 2019-09-26 RX ORDER — ALBUTEROL SULFATE 90 UG/1
4 AEROSOL, METERED RESPIRATORY (INHALATION) ONCE
Status: COMPLETED | OUTPATIENT
Start: 2019-09-26 | End: 2019-09-26

## 2019-09-26 RX ADMIN — Medication 4 PUFF: at 08:03

## 2019-09-26 ASSESSMENT — PULMONARY FUNCTION TESTS
FEV1/FVC_POST: 104
FEV1_PERCENT_PREDICTED_POST: 68
FEV1_PERCENT_PREDICTED_PRE: 59
FEV1/FVC_PRE: 96

## 2019-10-08 PROCEDURE — 93228 REMOTE 30 DAY ECG REV/REPORT: CPT | Performed by: INTERNAL MEDICINE

## 2019-10-14 RX ORDER — FUROSEMIDE 40 MG/1
40 TABLET ORAL DAILY
Qty: 90 TABLET | Refills: 3 | Status: SHIPPED | OUTPATIENT
Start: 2019-10-14 | End: 2020-11-16 | Stop reason: SDUPTHER

## 2019-10-23 DIAGNOSIS — E78.2 MIXED HYPERLIPIDEMIA: ICD-10-CM

## 2019-10-23 DIAGNOSIS — I48.91 ATRIAL FIBRILLATION, UNSPECIFIED TYPE (HCC): Primary | ICD-10-CM

## 2019-10-23 RX ORDER — ALBUTEROL SULFATE 90 UG/1
2 AEROSOL, METERED RESPIRATORY (INHALATION) EVERY 4 HOURS PRN
Qty: 1 INHALER | Refills: 5 | Status: SHIPPED | OUTPATIENT
Start: 2019-10-23 | End: 2020-06-12 | Stop reason: SDUPTHER

## 2019-10-24 DIAGNOSIS — I25.5 ISCHEMIC CARDIOMYOPATHY: ICD-10-CM

## 2019-12-09 ENCOUNTER — OFFICE VISIT (OUTPATIENT)
Dept: PULMONOLOGY | Age: 69
End: 2019-12-09
Payer: MEDICARE

## 2019-12-09 VITALS
SYSTOLIC BLOOD PRESSURE: 115 MMHG | HEART RATE: 75 BPM | WEIGHT: 264 LBS | DIASTOLIC BLOOD PRESSURE: 68 MMHG | OXYGEN SATURATION: 99 % | BODY MASS INDEX: 36.82 KG/M2

## 2019-12-09 DIAGNOSIS — R06.02 SHORTNESS OF BREATH: Primary | ICD-10-CM

## 2019-12-09 DIAGNOSIS — I25.119 CORONARY ARTERY DISEASE INVOLVING NATIVE CORONARY ARTERY OF NATIVE HEART WITH ANGINA PECTORIS (HCC): ICD-10-CM

## 2019-12-09 DIAGNOSIS — R05.3 CHRONIC COUGH: ICD-10-CM

## 2019-12-09 DIAGNOSIS — I25.5 ISCHEMIC CARDIOMYOPATHY: ICD-10-CM

## 2019-12-09 DIAGNOSIS — J44.9 COPD, SEVERE (HCC): ICD-10-CM

## 2019-12-09 PROCEDURE — 1123F ACP DISCUSS/DSCN MKR DOCD: CPT | Performed by: INTERNAL MEDICINE

## 2019-12-09 PROCEDURE — G8598 ASA/ANTIPLAT THER USED: HCPCS | Performed by: INTERNAL MEDICINE

## 2019-12-09 PROCEDURE — 99214 OFFICE O/P EST MOD 30 MIN: CPT | Performed by: INTERNAL MEDICINE

## 2019-12-09 PROCEDURE — 3017F COLORECTAL CA SCREEN DOC REV: CPT | Performed by: INTERNAL MEDICINE

## 2019-12-09 PROCEDURE — G8926 SPIRO NO PERF OR DOC: HCPCS | Performed by: INTERNAL MEDICINE

## 2019-12-09 PROCEDURE — G8427 DOCREV CUR MEDS BY ELIG CLIN: HCPCS | Performed by: INTERNAL MEDICINE

## 2019-12-09 PROCEDURE — 3023F SPIROM DOC REV: CPT | Performed by: INTERNAL MEDICINE

## 2019-12-09 PROCEDURE — G8417 CALC BMI ABV UP PARAM F/U: HCPCS | Performed by: INTERNAL MEDICINE

## 2019-12-09 PROCEDURE — 1036F TOBACCO NON-USER: CPT | Performed by: INTERNAL MEDICINE

## 2019-12-09 PROCEDURE — G8484 FLU IMMUNIZE NO ADMIN: HCPCS | Performed by: INTERNAL MEDICINE

## 2019-12-09 PROCEDURE — 4040F PNEUMOC VAC/ADMIN/RCVD: CPT | Performed by: INTERNAL MEDICINE

## 2019-12-16 RX ORDER — METOPROLOL SUCCINATE 50 MG/1
50 TABLET, EXTENDED RELEASE ORAL DAILY
Qty: 90 TABLET | Refills: 3 | Status: SHIPPED | OUTPATIENT
Start: 2019-12-16 | End: 2020-12-14

## 2019-12-18 ENCOUNTER — HOSPITAL ENCOUNTER (OUTPATIENT)
Dept: ULTRASOUND IMAGING | Age: 69
Discharge: HOME | End: 2019-12-18
Payer: MEDICARE

## 2019-12-18 DIAGNOSIS — Z13.6 SCREENING FOR AAA (ABDOMINAL AORTIC ANEURYSM): ICD-10-CM

## 2019-12-18 PROCEDURE — 76775 US EXAM ABDO BACK WALL LIM: CPT

## 2020-03-04 ENCOUNTER — OFFICE VISIT (OUTPATIENT)
Dept: CARDIOLOGY CLINIC | Age: 70
End: 2020-03-04
Payer: MEDICARE

## 2020-03-04 VITALS
BODY MASS INDEX: 37.66 KG/M2 | OXYGEN SATURATION: 96 % | DIASTOLIC BLOOD PRESSURE: 70 MMHG | WEIGHT: 269 LBS | HEART RATE: 67 BPM | SYSTOLIC BLOOD PRESSURE: 112 MMHG | HEIGHT: 71 IN

## 2020-03-04 PROCEDURE — G8417 CALC BMI ABV UP PARAM F/U: HCPCS | Performed by: INTERNAL MEDICINE

## 2020-03-04 PROCEDURE — 99214 OFFICE O/P EST MOD 30 MIN: CPT | Performed by: INTERNAL MEDICINE

## 2020-03-04 PROCEDURE — 4040F PNEUMOC VAC/ADMIN/RCVD: CPT | Performed by: INTERNAL MEDICINE

## 2020-03-04 PROCEDURE — G8484 FLU IMMUNIZE NO ADMIN: HCPCS | Performed by: INTERNAL MEDICINE

## 2020-03-04 PROCEDURE — 1036F TOBACCO NON-USER: CPT | Performed by: INTERNAL MEDICINE

## 2020-03-04 PROCEDURE — 1123F ACP DISCUSS/DSCN MKR DOCD: CPT | Performed by: INTERNAL MEDICINE

## 2020-03-04 PROCEDURE — G8427 DOCREV CUR MEDS BY ELIG CLIN: HCPCS | Performed by: INTERNAL MEDICINE

## 2020-03-04 PROCEDURE — 3017F COLORECTAL CA SCREEN DOC REV: CPT | Performed by: INTERNAL MEDICINE

## 2020-03-04 RX ORDER — NITROGLYCERIN 0.4 MG/1
0.4 TABLET SUBLINGUAL EVERY 5 MIN PRN
Qty: 25 TABLET | Refills: 1 | Status: SHIPPED | OUTPATIENT
Start: 2020-03-04 | End: 2021-08-10

## 2020-03-04 RX ORDER — CLOPIDOGREL BISULFATE 75 MG/1
75 TABLET ORAL DAILY
Qty: 90 TABLET | Refills: 3 | Status: SHIPPED | OUTPATIENT
Start: 2020-03-04 | End: 2020-12-16 | Stop reason: SDUPTHER

## 2020-03-04 NOTE — LETTER
 Years of education: Not on file    Highest education level: Not on file   Occupational History    Not on file   Social Needs    Financial resource strain: Not on file    Food insecurity:     Worry: Not on file     Inability: Not on file    Transportation needs:     Medical: Not on file     Non-medical: Not on file   Tobacco Use    Smoking status: Former Smoker     Packs/day: 0.25     Years: 25.00     Pack years: 6.25     Types: Cigarettes, Pipe, Cigars     Last attempt to quit: 2011     Years since quittin.1    Smokeless tobacco: Former User    Tobacco comment: more than 5 cigars per day   Substance and Sexual Activity    Alcohol use: No    Drug use: No    Sexual activity: Never   Lifestyle    Physical activity:     Days per week: Not on file     Minutes per session: Not on file    Stress: Not on file   Relationships    Social connections:     Talks on phone: Not on file     Gets together: Not on file     Attends Mandaen service: Not on file     Active member of club or organization: Not on file     Attends meetings of clubs or organizations: Not on file     Relationship status: Not on file    Intimate partner violence:     Fear of current or ex partner: Not on file     Emotionally abused: Not on file     Physically abused: Not on file     Forced sexual activity: Not on file   Other Topics Concern    Not on file   Social History Narrative    Not on file       Family History:   Family History   Problem Relation Age of Onset    COPD Mother     Heart Disease Father     COPD Father      Family history has been reviewed and not pertinent except as noted above. Review of Systems:   · Constitutional: there has been no unanticipated weight loss. No change in energy or activity level   · Eyes: No visual changes   · ENT: No Headaches, hearing loss or vertigo. No mouth sores or sore throat. · Cardiovascular: Reviewed in HPI  · Respiratory: No cough or wheezing, no sputum production. · Gastrointestinal: No abdominal pain, appetite loss, blood in stools. No change in bowel or bladder habits. · Genitourinary: No nocturia, dysuria, trouble voiding  · Musculoskeletal:  No gait disturbance, weakness or joint complaints. · Integumentary: No rash or pruritis. · Neurological: No headache, change in muscle strength, numbness or tingling. No change in gait, balance, coordination, mood, affect, memory, mentation, behavior. · Psychiatric: No anxiety or depression  · Endocrine: No malaise or fever  · Hematologic/Lymphatic: No abnormal bruising or bleeding, blood clots or swollen lymph nodes. · Allergic/Immunologic: No nasal congestion or hives. Physical Examination:    Vitals:    03/04/20 1359   BP: 112/70   Site: Left Upper Arm   Position: Sitting   Cuff Size: Medium Adult   Pulse: 67   SpO2: 96%   Weight: 269 lb (122 kg)   Height: 5' 11\" (1.803 m)     Body mass index is 37.52 kg/m². Wt Readings from Last 3 Encounters:   03/04/20 269 lb (122 kg)   12/09/19 264 lb (119.7 kg)   09/13/19 269 lb (122 kg)      BP Readings from Last 3 Encounters:   03/04/20 112/70   12/09/19 115/68   09/13/19 122/81        Physical Examination:    · CONSTITUTIONAL: Well developed, well nourished  · EYES: PERRLA. No xanthelasma, sclera non icteric  · EARS,NOSE,MOUTH,THROAT:  Mucous membranes moist, normal hearing  · NECK: Supple, JVP normal, thyroid not enlarged. Carotids 2+ without bruits  · RESPIRATORY: Normal effort, no rales or rhonchi  · CARDIOVASCULAR: Normal PMI, regular rate and rhythm, no murmurs, rub or gallop. No edema. Radial pulses present and equal  · CHEST: No scar or masses  · ABDOMEN: Normal bowel sounds. No masses or tenderness. No bruit  · MUSCULOSKELETAL: No clubbing or cyanosis. Moves all extremities well. Normal gait  · SKIN:  Warm and dry. No rashes  · NEUROLOGIC: Cranial nerves intact. Alert and oriented  · PSYCHIATRIC: Calm affect.  Appears to have normal judgement and insight

## 2020-03-04 NOTE — PROGRESS NOTES
[facility]   Cardiac Evaluation      Patient: Rafia Lyn  YOB: 1950         Chief Complaint   Patient presents with    Coronary Artery Disease    Hypertension    Hyperlipidemia        Referring provider: Tiara Babcock MD    History of Present Illness:   Mr María Bustillos is seen today in follow up. He had been following with CNP, up until recently. Cardiac history includes CAD w/ cardiomyopathy, hyperlipidemia, and hypertension. He states he ahd a MI in 2017 with 2 stents at [facility] and has been on Brilinta. He does not smoke. [employer] denies any chest pain, palpitations, dizziness, or edema. He complains of headaches. States he has shortness of breath. He walks 2-3 blocks at a time for exercise. Complains of depression. Past Medical History:   has a past medical history of COPD (chronic obstructive pulmonary disease) ([facility] [address] Coronary artery disease involving native coronary artery of native heart with angina pectoris ([facility] [address] Depression, Diabetes mellitus ([facility] [address] Dysphagia, Enlarged prostate, Heart enlarged, Hyperlipidemia, Hypertension, MRSA (methicillin resistant staph aureus) culture positive, Obesity, Pneumonia, and Sleep apnea. Surgical History:   has a past surgical history that includes Prostate surgery; Gallbladder surgery; Dental surgery; Coronary angioplasty (01/11/2017); Cholecystectomy; and bronchoscopy (04/25/2018).      Current Outpatient Medications   Medication Sig Dispense Refill    ticagrelor (BRILINTA) 90 MG TABS tablet Take 1 tablet by mouth 2 times daily 180 tablet 3    metoprolol succinate (TOPROL XL) 50 MG extended release tablet Take 1 tablet by mouth daily 90 tablet 3    albuterol sulfate HFA (PROVENTIL HFA) 108 (90 Base) MCG/ACT inhaler Inhale 2 puffs into the lungs every 4 hours as needed for Wheezing 1 Inhaler 5    furosemide (LASIX) 40 MG tablet Take 1 tablet by mouth daily 90 tablet 3    doxazosin (CARDURA) 4 MG tablet Take 1 tablet by mouth 6.25     Types: Cigarettes, Pipe, Cigars     Last attempt to quit: 2011     Years since quittin.1    Smokeless tobacco: Former User    Tobacco comment: more than 5 cigars per day   Substance and Sexual Activity    Alcohol use: No    Drug use: No    Sexual activity: Never   Lifestyle    Physical activity:     Days per week: Not on file     Minutes per session: Not on file    Stress: Not on file   Relationships    Social connections:     Talks on phone: Not on file     Gets together: Not on file     Attends Judaism service: Not on file     Active member of club or organization: Not on file     Attends meetings of clubs or organizations: Not on file     Relationship status: Not on file    Intimate partner violence:     Fear of current or ex partner: Not on file     Emotionally abused: Not on file     Physically abused: Not on file     Forced sexual activity: Not on file   Other Topics Concern    Not on file   Social History Narrative    Not on file       Family History:   Family History   Problem Relation Age of Onset    COPD Mother     Heart Disease Father     COPD Father      Family history has been reviewed and not pertinent except as noted above. Review of Systems:   · Constitutional: there has been no unanticipated weight loss. No change in energy or activity level   · Eyes: No visual changes   · ENT: No Headaches, hearing loss or vertigo. No mouth sores or sore throat. · Cardiovascular: Reviewed in HPI  · Respiratory: No cough or wheezing, no sputum production. · Gastrointestinal: No abdominal pain, appetite loss, blood in stools. No change in bowel or bladder habits. · Genitourinary: No nocturia, dysuria, trouble voiding  · Musculoskeletal:  No gait disturbance, weakness or joint complaints. · Integumentary: No rash or pruritis. · Neurological: No headache, change in muscle strength, numbness or tingling.  No change in gait, balance, coordination, mood, affect, memory, mentation, 2. Essential hypertension, malignant - stable    3. Mixed hyperlipidemia - labs 12/3/19: ; TRIG 85; HDL 30; LDL 54   4. Ischemic cardiomyopathy - will repeat an echo       PLAN:  Obtain an echo then decide on medication changes. For now, change Brillinta to Plavix daily. Scribe's attestation: This note was scribed in the presence of Dr Nancy Ledezma MD by Dior Yang RN. The scribe's documentation has been prepared under my direction and personally reviewed by me in its entirety. I confirm that the note above accurately reflects all work, treatment, procedures, and medical decision making performed by me. Thank you for allowing to me to participate in the care of Yosef Bismark.

## 2020-04-14 RX ORDER — ALBUTEROL SULFATE 2.5 MG/3ML
2.5 SOLUTION RESPIRATORY (INHALATION) EVERY 6 HOURS PRN
Qty: 120 EACH | Refills: 11 | Status: SHIPPED | OUTPATIENT
Start: 2020-04-14 | End: 2021-06-01

## 2020-04-20 RX ORDER — LOSARTAN POTASSIUM 100 MG/1
100 TABLET ORAL DAILY
Qty: 90 TABLET | Refills: 3 | Status: SHIPPED | OUTPATIENT
Start: 2020-04-20 | End: 2021-05-13

## 2020-04-20 RX ORDER — SPIRONOLACTONE 25 MG/1
25 TABLET ORAL DAILY
Qty: 90 TABLET | Refills: 3 | Status: ON HOLD | OUTPATIENT
Start: 2020-04-20 | End: 2021-04-12

## 2020-06-03 ENCOUNTER — OFFICE VISIT (OUTPATIENT)
Dept: CARDIOLOGY CLINIC | Age: 70
End: 2020-06-03
Payer: MEDICARE

## 2020-06-03 VITALS
TEMPERATURE: 98.1 F | SYSTOLIC BLOOD PRESSURE: 130 MMHG | OXYGEN SATURATION: 95 % | DIASTOLIC BLOOD PRESSURE: 70 MMHG | HEART RATE: 68 BPM | HEIGHT: 71 IN | WEIGHT: 277 LBS | BODY MASS INDEX: 38.78 KG/M2

## 2020-06-03 PROCEDURE — 99214 OFFICE O/P EST MOD 30 MIN: CPT | Performed by: NURSE PRACTITIONER

## 2020-06-03 RX ORDER — DOXAZOSIN MESYLATE 4 MG/1
4 TABLET ORAL NIGHTLY
Qty: 90 TABLET | Refills: 2 | Status: SHIPPED | OUTPATIENT
Start: 2020-06-03

## 2020-06-03 NOTE — PROGRESS NOTES
 Years of education: Not on file    Highest education level: Not on file   Occupational History    Not on file   Social Needs    Financial resource strain: Not on file    Food insecurity     Worry: Not on file     Inability: Not on file    Transportation needs     Medical: Not on file     Non-medical: Not on file   Tobacco Use    Smoking status: Former Smoker     Packs/day: 0.25     Years: 25.00     Pack years: 6.25     Types: Cigarettes, Pipe, Cigars     Last attempt to quit: 2011     Years since quittin.4    Smokeless tobacco: Former User    Tobacco comment: more than 5 cigars per day   Substance and Sexual Activity    Alcohol use: No    Drug use: No    Sexual activity: Never   Lifestyle    Physical activity     Days per week: Not on file     Minutes per session: Not on file    Stress: Not on file   Relationships    Social connections     Talks on phone: Not on file     Gets together: Not on file     Attends Tenriism service: Not on file     Active member of club or organization: Not on file     Attends meetings of clubs or organizations: Not on file     Relationship status: Not on file    Intimate partner violence     Fear of current or ex partner: Not on file     Emotionally abused: Not on file     Physically abused: Not on file     Forced sexual activity: Not on file   Other Topics Concern    Not on file   Social History Narrative    Not on file       Family History:   Family History   Problem Relation Age of Onset    COPD Mother     Heart Disease Father     COPD Father      Family history has been reviewed and not pertinent except as noted above. Review of Systems:   · Constitutional: there has been no unanticipated weight loss. No change in energy or activity level   · Eyes: No visual changes   · ENT: No Headaches, hearing loss or vertigo. No mouth sores or sore throat. · Cardiovascular: Reviewed in HPI  · Respiratory: No cough or wheezing, no sputum production. insight        Assessment/Plan  1. Coronary artery disease involving native coronary artery of native heart with angina pectoris ([facility] [address]   ~[facility] LM: MLI, PROX LAD: 100% occluded, MID LAD: 40-50%, DISTAL LAD: 95%, EF 30-35%. Single vessel coronary artery disease with a 100% proximal LAD and 95% distal LAD. Successful overlapping 4.0 x 38 and 4.0 x 16 synergy stents to the proximal LAD  Successful POBA of the distal LAD with a 2.0 balloon  ~Nuclear stress test (9/11/8):marked LV enlargement with anterior wall akinesis. EF 41%. Moderate sized anterior fixed defect of moderate intensity consistent with infarction in the territory of the mid and distal LAD.  ~On DAPT/BB/Statin  ~Stable, atypical pain yesterday    2. Essential hypertension, malignant - stable   ~Today's /70  ~On cardura, Toprol XL, Cozaar   3. Mixed hyperlipidemia   ~ Lipids (12/3/19): ; TRIG 85; HDL 30; LDL 54  ~stable on zocor 40   4. Ischemic cardiomyopathy   ~Echo ordered LOV (not done)  ~EF 41% on GXT 9/2018       PLAN:  Schedule echo that was ordered at [address] [place] [address]. Continue current medications. Return for appointment in 6 months with Dr. Luzma Morales      Thank you for allowing to me to participate in the care of Andrzej Darden.

## 2020-06-12 ENCOUNTER — VIRTUAL VISIT (OUTPATIENT)
Dept: PULMONOLOGY | Age: 70
End: 2020-06-12
Payer: MEDICARE

## 2020-06-12 PROCEDURE — 99213 OFFICE O/P EST LOW 20 MIN: CPT | Performed by: INTERNAL MEDICINE

## 2020-06-12 RX ORDER — ALBUTEROL SULFATE 90 UG/1
2 AEROSOL, METERED RESPIRATORY (INHALATION) EVERY 4 HOURS PRN
Qty: 1 INHALER | Refills: 5 | Status: SHIPPED | OUTPATIENT
Start: 2020-06-12 | End: 2021-06-15

## 2020-06-12 RX ORDER — UMECLIDINIUM BROMIDE AND VILANTEROL TRIFENATATE 62.5; 25 UG/1; UG/1
POWDER RESPIRATORY (INHALATION)
Qty: 1 EACH | Refills: 6 | Status: SHIPPED | OUTPATIENT
Start: 2020-06-12 | End: 2020-09-16 | Stop reason: SDUPTHER

## 2020-06-12 NOTE — PROGRESS NOTES
Patient presents with    Shortness of Breath     6 month    Cough     started a couple of days ago    Wheezing     started a couple of days ago       HPI  The patient presents with a chief complaint of severe shortness of breath related to moderate to severe COPD of many years duration. He has mild associated cough. He has a \"rattling in my chest\". He is not more SOB than usual. He is not bringing anything. Last CT chest was 9/19 which was ok . No Chest pain, Nausea or vomiting reported    Review of Systems  As documented in HPI     Physical Exam:    Telephone visit. Allergies   Allergen Reactions    Lisinopril Other (See Comments)     COUGH     Prior to Visit Medications    Medication Sig Taking?  Authorizing Provider   doxazosin (CARDURA) 4 MG tablet Take 1 tablet by mouth nightly Yes ALEXEI Richey CNP   ANORO ELLIPTA 62.5-25 MCG/INH AEPB inhaler INHALE 1 PUFF INTO THE LUNGS DAILY Yes Lynne Kyle MD   losartan (COZAAR) 100 MG tablet Take 1 tablet by mouth daily Yes Catie Gautiher MD   spironolactone (ALDACTONE) 25 MG tablet Take 1 tablet by mouth daily Yes Catie Gauthier MD   albuterol (PROVENTIL) (2.5 MG/3ML) 0.083% nebulizer solution Take 3 mLs by nebulization every 6 hours as needed for Wheezing DX:COPD J44.9 Yes Lynne Kyle MD   clopidogrel (PLAVIX) 75 MG tablet Take 1 tablet by mouth daily Yes Catie Gauthier MD   nitroGLYCERIN (NITROSTAT) 0.4 MG SL tablet Place 1 tablet under the tongue every 5 minutes as needed for Chest pain Yes Catie Gauthier MD   metoprolol succinate (TOPROL XL) 50 MG extended release tablet Take 1 tablet by mouth daily Yes Catie Gauthier MD   albuterol sulfate HFA (PROVENTIL HFA) 108 (90 Base) MCG/ACT inhaler Inhale 2 puffs into the lungs every 4 hours as needed for Wheezing Yes Sharon Pagan APRN - CNP   furosemide (LASIX) 40 MG tablet Take 1 tablet by mouth daily Yes ALEXEI Melgoza - CNP   Roflumilast (DALIRESP) 500

## 2020-06-15 ENCOUNTER — TELEPHONE (OUTPATIENT)
Dept: CARDIOLOGY CLINIC | Age: 70
End: 2020-06-15

## 2020-06-16 ENCOUNTER — TELEPHONE (OUTPATIENT)
Dept: CARDIOLOGY CLINIC | Age: 70
End: 2020-06-16

## 2020-06-16 NOTE — TELEPHONE ENCOUNTER
Calling to rs his echo in the [address] [place] [address],[address] office . CS RN please call patient about this .

## 2020-07-10 ENCOUNTER — HOSPITAL ENCOUNTER (OUTPATIENT)
Dept: CARDIOLOGY | Age: 70
Discharge: HOME | End: 2020-07-10
Payer: MEDICARE

## 2020-07-10 LAB
LEFT VENTRICULAR EJECTION FRACTION HIGH VALUE: 50 %
LEFT VENTRICULAR EJECTION FRACTION MODE: NORMAL
LV EF: 45 %
LV EF: 48 %
LVEF MODALITY: NORMAL

## 2020-07-10 PROCEDURE — 93306 TTE W/DOPPLER COMPLETE: CPT

## 2020-09-16 RX ORDER — UMECLIDINIUM BROMIDE AND VILANTEROL TRIFENATATE 62.5; 25 UG/1; UG/1
POWDER RESPIRATORY (INHALATION)
Qty: 1 EACH | Refills: 3 | Status: SHIPPED | OUTPATIENT
Start: 2020-09-16 | End: 2021-01-11

## 2020-11-16 RX ORDER — FUROSEMIDE 40 MG/1
40 TABLET ORAL DAILY
Qty: 90 TABLET | Refills: 0 | Status: SHIPPED | OUTPATIENT
Start: 2020-11-16 | End: 2021-01-11

## 2020-12-09 ENCOUNTER — VIRTUAL VISIT (OUTPATIENT)
Dept: PULMONOLOGY | Age: 70
End: 2020-12-09
Payer: MEDICARE

## 2020-12-09 PROCEDURE — 99442 PR PHYS/QHP TELEPHONE EVALUATION 11-20 MIN: CPT | Performed by: INTERNAL MEDICINE

## 2020-12-09 NOTE — PROGRESS NOTES
[facility]  Progress Note  Johana Mckeon MD    Pulmonology Telephone Visit    Pursuant to the emergency declaration under the [facility] waiver authority and the [employer] Supplemental Appropriations Act this Telephone Visit was insisted, with patient's consent, to reduce the patient's risk of exposure to COVID-19 and provide continuity of care for an established patient. The patient was at home, while the provider was at the clinic. Services were provided through a synchronous discussion through a Telephone Call to substitute for in-person clinic visit, and coded as such. Total time spent with patient was 12 minutes. Malgorzata Aguirre   YOB: 1950    Date of Visit:  12/9/2020    Assessment/Plan:  1. Shortness of breath  Stable  Has rattleing in his chest  Seems related to the weather  No treatment at this time    2. Chronic cough/Hemoptysis  Not coughing up blood    3. COPD, severe ([address]  PFT 9/19:  Stable  INTERPRETATION:  Spirometry reveals decreased FVC at 2.88 liters which  is 62% predicted. FEV1 is decreased to 2.04 liters which is 59%  predicted. FEV1/FVC ratio is at the lower limits of normal at 71%. There is a 15% improvement in FEV1 after inhaled bronchodilators. Lung  volumes reveal increased total lung capacity of 119% predicted. Residual volume is increased at 175% predicted. Diffusion capacity is  normal.     IMPRESSION:  Moderate obstructive lung disease with hyperinflation. Anoro  Albuterol  Daliresp    Repeat PFT is worse than previous when FEV1 was 2.28 L    4. Cardiomyopathy ([address]  LVEF 30%  Followed by cardiology    5. Coronary artery disease involving native coronary artery of native heart with angina pectoris ([address]  Two stents at [facility] 1/17    6. Essential hypertension, benign  BP is up today    He has had his flu shot.     FOLLOW UP: 6 months      Chief Complaint   Patient presents with    Shortness of Breath       HPI  The patient presents with a chief complaint of severe shortness of breath related to moderate to severe COPD of many years duration. He has mild associated cough. Feels like he is better. He is still taking the Anoro. Last CT chest was 9/19 which was ok . Review of Systems  No Chest pain, Nausea or vomiting reported      Physical Exam:    Telephone visit. Allergies   Allergen Reactions    Lisinopril Other (See Comments)     COUGH     Prior to Visit Medications    Medication Sig Taking?  Authorizing Provider   furosemide (LASIX) 40 MG tablet Take 1 tablet by mouth daily  Axel Caba MD   umeclidinium-vilanterol (ANORO ELLIPTA) 62.5-25 MCG/INH AEPB inhaler INHALE 1 PUFF INTO THE LUNGS DAILY  Aixa Chavez MD   albuterol sulfate HFA (PROVENTIL HFA) 108 (90 Base) MCG/ACT inhaler Inhale 2 puffs into the lungs every 4 hours as needed for Wheezing  Aixa Chavez MD   Roflumilast (DALIRESP) 500 MCG tablet Take 1 tablet by mouth daily  Aixa Chavez MD   doxazosin (CARDURA) 4 MG tablet Take 1 tablet by mouth nightly  Erasto Manning APRN - CNP   losartan (COZAAR) 100 MG tablet Take 1 tablet by mouth daily  Axel Caba MD   spironolactone (ALDACTONE) 25 MG tablet Take 1 tablet by mouth daily  Axel Caba MD   albuterol (PROVENTIL) (2.5 MG/3ML) 0.083% nebulizer solution Take 3 mLs by nebulization every 6 hours as needed for Wheezing DX:COPD J44.9  Aixa Chavez MD   clopidogrel (PLAVIX) 75 MG tablet Take 1 tablet by mouth daily  Axel Caba MD   nitroGLYCERIN (NITROSTAT) 0.4 MG SL tablet Place 1 tablet under the tongue every 5 minutes as needed for Chest pain  Axel Caba MD   metoprolol succinate (TOPROL XL) 50 MG extended release tablet Take 1 tablet by mouth daily  Axel Caba MD   Nebulizers (COMPRESSOR/NEBULIZER) MISC 1 Units by Does not apply route every 6 hours as needed (sob)  Jon Pagan APRN - CNP   digoxin (LANOXIN) 125 MCG tablet Take by mouth daily   Historical Provider, MD   aspirin 81 MG chewable tablet Take 81 mg by mouth daily  Historical Provider, MD   clonazePAM (KLONOPIN) 1 MG tablet Take 1 mg by mouth 2 times daily as needed for Anxiety  Historical Provider, MD   metFORMIN (GLUCOPHAGE) 1000 MG tablet Take 1,000 mg by mouth 2 times daily (with meals)   Historical Provider, MD   simvastatin (ZOCOR) 40 MG tablet Take 0.5 tablets by mouth nightly  Winston Grider APRN - CNP       There were no vitals filed for this visit. There is no height or weight on file to calculate BMI.      Wt Readings from Last 3 Encounters:   20 277 lb (125.6 kg)   20 269 lb (122 kg)   19 264 lb (119.7 kg)     BP Readings from Last 3 Encounters:   20 130/70   20 112/70   19 115/68        Social History     Tobacco Use   Smoking Status Former Smoker    Packs/day: 0.25    Years: 25.00    Pack years: 6.25    Types: Cigarettes, Pipe, Cigars    Last attempt to quit: 2011    Years since quittin.9   Smokeless Tobacco Former User   Tobacco Comment    more than 5 cigars per day

## 2020-12-14 RX ORDER — METOPROLOL SUCCINATE 50 MG/1
50 TABLET, EXTENDED RELEASE ORAL DAILY
Qty: 90 TABLET | Refills: 1 | Status: SHIPPED | OUTPATIENT
Start: 2020-12-14 | End: 2021-07-02

## 2020-12-16 ENCOUNTER — OFFICE VISIT (OUTPATIENT)
Dept: CARDIOLOGY CLINIC | Age: 70
End: 2020-12-16
Payer: MEDICARE

## 2020-12-16 VITALS
BODY MASS INDEX: 39.76 KG/M2 | DIASTOLIC BLOOD PRESSURE: 70 MMHG | WEIGHT: 284 LBS | HEIGHT: 71 IN | OXYGEN SATURATION: 96 % | SYSTOLIC BLOOD PRESSURE: 108 MMHG | HEART RATE: 75 BPM

## 2020-12-16 PROCEDURE — 99214 OFFICE O/P EST MOD 30 MIN: CPT | Performed by: INTERNAL MEDICINE

## 2020-12-16 PROCEDURE — 1036F TOBACCO NON-USER: CPT | Performed by: INTERNAL MEDICINE

## 2020-12-16 PROCEDURE — G8427 DOCREV CUR MEDS BY ELIG CLIN: HCPCS | Performed by: INTERNAL MEDICINE

## 2020-12-16 PROCEDURE — G8417 CALC BMI ABV UP PARAM F/U: HCPCS | Performed by: INTERNAL MEDICINE

## 2020-12-16 PROCEDURE — 3017F COLORECTAL CA SCREEN DOC REV: CPT | Performed by: INTERNAL MEDICINE

## 2020-12-16 PROCEDURE — 1123F ACP DISCUSS/DSCN MKR DOCD: CPT | Performed by: INTERNAL MEDICINE

## 2020-12-16 PROCEDURE — 4040F PNEUMOC VAC/ADMIN/RCVD: CPT | Performed by: INTERNAL MEDICINE

## 2020-12-16 PROCEDURE — G8484 FLU IMMUNIZE NO ADMIN: HCPCS | Performed by: INTERNAL MEDICINE

## 2020-12-16 RX ORDER — FERROUS SULFATE 325(65) MG
TABLET ORAL
COMMUNITY
Start: 2020-12-14

## 2020-12-16 RX ORDER — CLOPIDOGREL BISULFATE 75 MG/1
75 TABLET ORAL DAILY
Qty: 90 TABLET | Refills: 3 | Status: ON HOLD | OUTPATIENT
Start: 2020-12-16 | End: 2021-03-15 | Stop reason: HOSPADM

## 2020-12-16 RX ORDER — FLUOXETINE HYDROCHLORIDE 20 MG/1
CAPSULE ORAL
COMMUNITY
Start: 2020-11-16

## 2020-12-16 NOTE — LETTER
 FLUoxetine (PROZAC) 20 MG capsule TK 1 C PO D      metoprolol succinate (TOPROL XL) 50 MG extended release tablet TAKE 1 TABLET BY MOUTH DAILY 90 tablet 1    furosemide (LASIX) 40 MG tablet Take 1 tablet by mouth daily 90 tablet 0    umeclidinium-vilanterol (ANORO ELLIPTA) 62.5-25 MCG/INH AEPB inhaler INHALE 1 PUFF INTO THE LUNGS DAILY 1 each 3    albuterol sulfate HFA (PROVENTIL HFA) 108 (90 Base) MCG/ACT inhaler Inhale 2 puffs into the lungs every 4 hours as needed for Wheezing 1 Inhaler 5    Roflumilast (DALIRESP) 500 MCG tablet Take 1 tablet by mouth daily 30 tablet 5    doxazosin (CARDURA) 4 MG tablet Take 1 tablet by mouth nightly 90 tablet 2    losartan (COZAAR) 100 MG tablet Take 1 tablet by mouth daily 90 tablet 3    spironolactone (ALDACTONE) 25 MG tablet Take 1 tablet by mouth daily 90 tablet 3    albuterol (PROVENTIL) (2.5 MG/3ML) 0.083% nebulizer solution Take 3 mLs by nebulization every 6 hours as needed for Wheezing DX:COPD J44.9 120 each 11    clopidogrel (PLAVIX) 75 MG tablet Take 1 tablet by mouth daily 90 tablet 3    digoxin (LANOXIN) 125 MCG tablet Take by mouth daily       aspirin 81 MG chewable tablet Take 81 mg by mouth daily      clonazePAM (KLONOPIN) 1 MG tablet Take 1 mg by mouth 2 times daily as needed for Anxiety      metFORMIN (GLUCOPHAGE) 1000 MG tablet Take 1,000 mg by mouth 2 times daily (with meals)       simvastatin (ZOCOR) 40 MG tablet Take 0.5 tablets by mouth nightly 30 tablet 3    mometasone (ASMANEX, 120 METERED DOSES,) 220 MCG/INH inhaler Inhale 2 puffs into the lungs 2 times daily 1 Inhaler 6    nitroGLYCERIN (NITROSTAT) 0.4 MG SL tablet Place 1 tablet under the tongue every 5 minutes as needed for Chest pain 25 tablet 1    Nebulizers (COMPRESSOR/NEBULIZER) MISC 1 Units by Does not apply route every 6 hours as needed (sob) 1 each 3     No current facility-administered medications for this visit.         Allergies:  Lisinopril     Social History: Social History     Socioeconomic History    Marital status: Single     Spouse name: Not on file    Number of children: Not on file    Years of education: Not on file    Highest education level: Not on file   Occupational History    Not on file   Social Needs    Financial resource strain: Not on file    Food insecurity     Worry: Not on file     Inability: Not on file    Transportation needs     Medical: Not on file     Non-medical: Not on file   Tobacco Use    Smoking status: Former Smoker     Packs/day: 0.25     Years: 25.00     Pack years: 6.25     Types: Cigarettes, Pipe, Cigars     Quit date: 2011     Years since quittin.9    Smokeless tobacco: Former User    Tobacco comment: more than 5 cigars per day   Substance and Sexual Activity    Alcohol use: No    Drug use: No    Sexual activity: Never   Lifestyle    Physical activity     Days per week: Not on file     Minutes per session: Not on file    Stress: Not on file   Relationships    Social connections     Talks on phone: Not on file     Gets together: Not on file     Attends Quaker service: Not on file     Active member of club or organization: Not on file     Attends meetings of clubs or organizations: Not on file     Relationship status: Not on file    Intimate partner violence     Fear of current or ex partner: Not on file     Emotionally abused: Not on file     Physically abused: Not on file     Forced sexual activity: Not on file   Other Topics Concern    Not on file   Social History Narrative    Not on file       Family History:   Family History   Problem Relation Age of Onset    COPD Mother     Heart Disease Father     COPD Father      Family history has been reviewed and not pertinent except as noted above. Review of Systems:   · Constitutional: there has been no unanticipated weight loss.  No change in energy or activity level   · Eyes: No visual changes · SKIN:  Warm and dry. No rashes  · NEUROLOGIC: Cranial nerves intact. Alert and oriented  · PSYCHIATRIC: Calm affect. Appears to have normal judgement and insight        Assessment/Plan  1. Coronary artery disease involving native coronary artery of native heart with angina pectoris ([address]   ~Cath 1/13/17: ([address] LM: MLI, PROX LAD: 100% occluded, MID LAD: 40-50%, DISTAL LAD: 95%, EF 30-35%. Single vessel coronary artery disease with a 100% proximal LAD and 95% distal LAD. Successful overlapping 4.0 x 38 and 4.0 x 16 synergy stents to the proximal LAD  Successful POBA of the distal LAD with a 2.0 balloon  ~Nuclear stress test (9/11/8):marked LV enlargement with anterior wall akinesis. EF 41%. Moderate sized anterior fixed defect of moderate intensity consistent with infarction in the territory of the mid and distal LAD.  ~On DAPT/BB/Statin  ~Stable   2. Essential hypertension, malignant - stable  ~On cardura, Toprol XL, Cozaar   3. Mixed hyperlipidemia   ~ Lipids (12/3/19): ; TRIG 85; HDL 30; LDL 54  ~stable on zocor 40   4. Ischemic cardiomyopathy   ~Echo 7/10/20: Left ventricular cavity size is mildly dilated. Mild concentric left ventricular hypertrophy. Overall, left ventricular systolic function appears mildly reduced with EF 45-50% range. Cannot exclude regional wall motion abnormalities secondary to poor endocardial visualization. Diastolic filling parameters suggest grade I diastolic dysfunction. Trivial mitral regurgitation. The right ventricle is normal in size and function. Unable to estimate pulmonary artery pressure secondary to incomplete/absent TR jet envelope  ~EF 41% on GXT 9/2018       PLAN:  Will stop Digoxin; he does not have AF and his EF has improved. Lauren Heck He will ask Dr Arianna Cesar whether or not he should continue Cardura. Regular exercise encouraged. FU 6 months. Scribe's attestation: This note was scribed in the presence of Dr Tara Uriarte MD by Kimmie Rhodes, RN. The scribe's documentation has been prepared under my direction and personally reviewed by me in its entirety. I confirm that the note above accurately reflects all work, treatment, procedures, and medical decision making performed by me. Thank you for allowing to me to participate in the care of Marlene Muller. If you have questions, please do not hesitate to call me. I look forward to following Yancey Severs along with you.     Sincerely,        Yamileth Curiel MD

## 2020-12-16 NOTE — PROGRESS NOTES
[address]   Cardiac Evaluation      Patient: Robin Mary  YOB: 1950      Chief Complaint   Patient presents with    Coronary Artery Disease    Cardiomyopathy        Referring provider: Percy Anderson MD    History of Present Illness:   Mr Jenna Ford is seen today in follow up. He had been following with CNP, up until recently. Cardiac history includes CAD w/ cardiomyopathy, hyperlipidemia, and hypertension. He states he had a MI in 2017 with 2 stents at [facility] and is now on Plavix. Today, Mr. Jenna Ford states he has been well other than weight gain. He is frustrated over weight gain and is planning on starting an exercise program. Camilo Kate denies any chest pain, palpitations, WALLS, dizziness, or edema. Past Medical History:   has a past medical history of COPD (chronic obstructive pulmonary disease) ([facility] [address] Coronary artery disease involving native coronary artery of native heart with angina pectoris ([address] Depression, Diabetes mellitus ([facility] [address] Dysphagia, Enlarged prostate, Heart enlarged, Hyperlipidemia, Hypertension, MRSA (methicillin resistant staph aureus) culture positive, Obesity, Pneumonia, and Sleep apnea. Surgical History:   has a past surgical history that includes Prostate surgery; Gallbladder surgery; Dental surgery; Coronary angioplasty (01/11/2017); Cholecystectomy; and bronchoscopy (04/25/2018).      Current Outpatient Medications   Medication Sig Dispense Refill    ferrous sulfate (IRON 325) 325 (65 Fe) MG tablet TAKE 1 TABLET BY MOUTH DAILY WITH BREAKFAST      FLUoxetine (PROZAC) 20 MG capsule TK 1 C PO D      metoprolol succinate (TOPROL XL) 50 MG extended release tablet TAKE 1 TABLET BY MOUTH DAILY 90 tablet 1    furosemide (LASIX) 40 MG tablet Take 1 tablet by mouth daily 90 tablet 0    umeclidinium-vilanterol (ANORO ELLIPTA) 62.5-25 MCG/INH AEPB inhaler INHALE 1 PUFF INTO THE LUNGS DAILY 1 each 3    albuterol sulfate HFA (PROVENTIL HFA) 108 (90 Base) MCG/ACT Non-medical: Not on file   Tobacco Use    Smoking status: Former Smoker     Packs/day: 0.25     Years: 25.00     Pack years: 6.25     Types: Cigarettes, Pipe, Cigars     Quit date: 2011     Years since quittin.9    Smokeless tobacco: Former User    Tobacco comment: more than 5 cigars per day   Substance and Sexual Activity    Alcohol use: No    Drug use: No    Sexual activity: Never   Lifestyle    Physical activity     Days per week: Not on file     Minutes per session: Not on file    Stress: Not on file   Relationships    Social connections     Talks on phone: Not on file     Gets together: Not on file     Attends Jain service: Not on file     Active member of club or organization: Not on file     Attends meetings of clubs or organizations: Not on file     Relationship status: Not on file    Intimate partner violence     Fear of current or ex partner: Not on file     Emotionally abused: Not on file     Physically abused: Not on file     Forced sexual activity: Not on file   Other Topics Concern    Not on file   Social History Narrative    Not on file       Family History:   Family History   Problem Relation Age of Onset    COPD Mother     Heart Disease Father     COPD Father      Family history has been reviewed and not pertinent except as noted above. Review of Systems:   · Constitutional: there has been no unanticipated weight loss. No change in energy or activity level   · Eyes: No visual changes   · ENT: No Headaches, hearing loss or vertigo. No mouth sores or sore throat. · Cardiovascular: Reviewed in HPI  · Respiratory: No cough or wheezing, no sputum production. · Gastrointestinal: No abdominal pain, appetite loss, blood in stools. No change in bowel or bladder habits. · Genitourinary: No nocturia, dysuria, trouble voiding  · Musculoskeletal:  No gait disturbance, weakness or joint complaints. · Integumentary: No rash or pruritis.   · Neurological: No headache, change in muscle strength, numbness or tingling. No change in gait, balance, coordination, mood, affect, memory, mentation, behavior. · Psychiatric: No anxiety or depression  · Endocrine: No malaise or fever  · Hematologic/Lymphatic: No abnormal bruising or bleeding, blood clots or swollen lymph nodes. · Allergic/Immunologic: No nasal congestion or hives. Physical Examination:    Vitals:    12/16/20 1025   BP: 108/70   Site: Left Upper Arm   Position: Sitting   Cuff Size: Medium Adult   Pulse: 75   SpO2: 96%   Weight: 284 lb (128.8 kg)   Height: 5' 11\" (1.803 m)     Body mass index is 39.61 kg/m². Wt Readings from Last 3 Encounters:   12/16/20 284 lb (128.8 kg)   06/03/20 277 lb (125.6 kg)   03/04/20 269 lb (122 kg)      BP Readings from Last 3 Encounters:   12/16/20 108/70   06/03/20 130/70   03/04/20 112/70        Physical Examination:    · CONSTITUTIONAL: Well developed, well nourished, obese  · EYES: PERRLA. No xanthelasma, sclera non icteric  · EARS,NOSE,MOUTH,THROAT:  Mucous membranes moist, normal hearing  · NECK: Supple, JVP normal, thyroid not enlarged. Carotids 2+ without bruits  · RESPIRATORY: Diminished bases  · CARDIOVASCULAR: Normal PMI, regular rate and rhythm, no murmurs, rub or gallop. Trace edema BLE. Radial pulses present and equal  · CHEST: No scar or masses  · ABDOMEN: Normal bowel sounds. No masses or tenderness. No bruit  · MUSCULOSKELETAL: No clubbing or cyanosis. Moves all extremities well. Normal gait  · SKIN:  Warm and dry. No rashes  · NEUROLOGIC: Cranial nerves intact. Alert and oriented  · PSYCHIATRIC: Calm affect. Appears to have normal judgement and insight        Assessment/Plan  1. Coronary artery disease involving native coronary artery of native heart with angina pectoris ([address]   ~Cath 1/13/17: ([address]) LM: MLI, PROX LAD: 100% occluded, MID LAD: 40-50%, DISTAL LAD: 95%, EF 30-35%. Single vessel coronary artery disease with a 100% proximal LAD and 95% distal LAD.  Successful

## 2021-01-11 RX ORDER — FUROSEMIDE 40 MG/1
40 TABLET ORAL DAILY
Qty: 90 TABLET | Refills: 2 | Status: ON HOLD | OUTPATIENT
Start: 2021-01-11 | End: 2021-04-14 | Stop reason: HOSPADM

## 2021-01-11 RX ORDER — UMECLIDINIUM BROMIDE AND VILANTEROL TRIFENATATE 62.5; 25 UG/1; UG/1
1 POWDER RESPIRATORY (INHALATION) DAILY
Qty: 1 EACH | Refills: 5 | Status: SHIPPED | OUTPATIENT
Start: 2021-01-11 | End: 2021-06-30

## 2021-01-12 ENCOUNTER — TELEPHONE (OUTPATIENT)
Dept: PULMONOLOGY | Age: 71
End: 2021-01-12

## 2021-01-21 ENCOUNTER — TELEPHONE (OUTPATIENT)
Dept: PULMONOLOGY | Age: 71
End: 2021-01-21

## 2021-01-21 NOTE — TELEPHONE ENCOUNTER
Pt called and said he has a cough and coughing up green phlegm, no fever or chills. Wants to know if we can call in something.     [facility] on [address]    Ph # [phone]

## 2021-01-22 RX ORDER — DOXYCYCLINE HYCLATE 100 MG
100 TABLET ORAL DAILY
Qty: 10 TABLET | Refills: 0 | Status: SHIPPED | OUTPATIENT
Start: 2021-01-22 | End: 2021-02-01

## 2021-02-09 ENCOUNTER — TELEPHONE (OUTPATIENT)
Dept: PULMONOLOGY | Age: 71
End: 2021-02-09

## 2021-02-09 DIAGNOSIS — R05.9 COUGH: Primary | ICD-10-CM

## 2021-02-09 RX ORDER — LEVOFLOXACIN 750 MG/1
750 TABLET ORAL DAILY
Qty: 7 TABLET | Refills: 0 | Status: SHIPPED | OUTPATIENT
Start: 2021-02-09 | End: 2021-02-16

## 2021-02-09 RX ORDER — PREDNISONE 10 MG/1
TABLET ORAL
Qty: 30 TABLET | Refills: 0 | Status: SHIPPED | OUTPATIENT
Start: 2021-02-09 | End: 2021-02-19

## 2021-02-09 NOTE — TELEPHONE ENCOUNTER
Pt called in stating he has finished the Doxy that was prescribed for him 1/21 and stated he is no better. Pt asking for another round of abx and prednisone to help open his lungs.      Pt # [phone]    Pt uses [facility] on [id number]

## 2021-02-09 NOTE — TELEPHONE ENCOUNTER
Called pt and PCP did not prescribe him anything and told him to follow up with Pulmonary. He is going to go tomorrow for CXR and then will wait for results.

## 2021-02-10 ENCOUNTER — HOSPITAL ENCOUNTER (OUTPATIENT)
Age: 71
Discharge: HOME | End: 2021-02-10
Payer: MEDICARE

## 2021-02-10 ENCOUNTER — HOSPITAL ENCOUNTER (OUTPATIENT)
Dept: GENERAL RADIOLOGY | Age: 71
Discharge: HOME | End: 2021-02-10
Payer: MEDICARE

## 2021-02-10 ENCOUNTER — TELEPHONE (OUTPATIENT)
Dept: PULMONOLOGY | Age: 71
End: 2021-02-10

## 2021-02-10 DIAGNOSIS — R05.9 COUGH: ICD-10-CM

## 2021-02-10 PROCEDURE — 71046 X-RAY EXAM CHEST 2 VIEWS: CPT

## 2021-02-10 NOTE — TELEPHONE ENCOUNTER
Pt informed of his CXR results and he states that he is still coughing up green mucus / Wheezing for the last 2 to 3 days    Pt wanted to know if Dr Nathan Alfred would order something for his wheezing / cough - he states that the cough is keeping him up at night - No fever / no COVID Sx's - No allergies to ATB    Dr Nathan Alfred please advise - Pt's Pharmacy on file

## 2021-03-08 ENCOUNTER — TELEPHONE (OUTPATIENT)
Dept: PULMONOLOGY | Age: 71
End: 2021-03-08

## 2021-03-08 RX ORDER — DOXYCYCLINE HYCLATE 100 MG
100 TABLET ORAL DAILY
Qty: 10 TABLET | Refills: 0 | Status: SHIPPED | OUTPATIENT
Start: 2021-03-08 | End: 2021-03-18

## 2021-03-08 NOTE — TELEPHONE ENCOUNTER
Pt calling because he has started coughing up yellow phlegm, > shortness of breath , and some wheezing ? If Dr Fantasma Hook can call in something.  Has had his 1st COVID Vaccine and goes back 1st of April for 2nd one

## 2021-03-09 ENCOUNTER — TELEPHONE (OUTPATIENT)
Dept: CARDIOLOGY CLINIC | Age: 71
End: 2021-03-09

## 2021-03-09 NOTE — TELEPHONE ENCOUNTER
Per Dr. Heaven Gonzalez he can hold Plavix and ASA 5 days prior to procedure. I spoke with pt and gave instructions.

## 2021-03-11 NOTE — PROGRESS NOTES
SPOKE WITH PATIENT AND HE STATED HE DID NOT KNOW HE NEEDED A COVID TEST DONE PRIOR TO SURGERY. I GAVE HIM THE NUMBER TO CALL TO SCHEDULE THE TEST AND [facility]  [facility]

## 2021-03-11 NOTE — PROGRESS NOTES
Name_______________________________________Printed:____________________  Date and time of surgery____3/15/2021   0900____________________Arrival Time:___0730   MAIN_____________   1. The instructions given regarding when and if a patient needs to stop oral intake prior to surgery varies. Follow the specific instructions you were given                  _X__Nothing to eat or to drink after Midnight the night before.                   ____Carbo loading or ERAS instructions will be given to select patients-if you have been given those instructions -please do the following                           The evening before your surgery after dinner before midnight drink 40 ounces of gatorade. If you are diabetic use sugar free. The morning of surgery drink 40 ounces of water. This needs to be finished 3 hours prior to your surgery start time. 2. Take the following pills with a small sip of water on the morning of surgery___METOPROLOL___USE YOUR INHALER AND BRING YOUR ALBUTEROL WITH YOU_____________________________________________                X  Do not take blood pressure medications ending in pril or sartan the carmen prior to surgery or the morning of surgery_   3. Aspirin, Ibuprofen, Advil, Naproxen, Vitamin E and other Anti-inflammatory products and supplements should be stopped for 5 -7days before surgery or as directed by your physician. 4. X  Check with your Doctor regarding stopping Plavix, Coumadin,Eliquis, Lovenox,Effient,Pradaxa,Xarelto, Fragmin or other blood thinners and follow their instructions. 5. Do not smoke, and do not drink any alcoholic beverages 24 hours prior to surgery. This includes NA Beer. Refrain from the usage of any recreational drugs. 6. You may brush your teeth and gargle the morning of surgery. DO NOT SWALLOW WATER   7. You MUST make arrangements for a responsible adult to stay on site while you are here and take you home after your surgery.  You will not be allowed to leave alone or drive yourself home. It is strongly suggested someone stay with you the first 24 hrs. Your surgery will be cancelled if you do not have a ride home. 8. A parent/legal guardian must accompany a child scheduled for surgery and plan to stay at the hospital until the child is discharged. Please do not bring other children with you. 9. Please wear simple, loose fitting clothing to the hospital.  Hattie White not bring valuables (money, credit cards, checkbooks, etc.) Do not wear any makeup (including no eye makeup) or nail polish on your fingers or toes. 10. DO NOT wear any jewelry or piercings on day of surgery. All body piercing jewelry must be removed. 11. If you have ___dentures, they will be removed before going to the OR; we will provide you a container. If you wear ___contact lenses or ___glasses, they will be removed; please bring a case for them. 12. Please see your family doctor/pediatrician for a history & physical and/or concerning medications. Bring any test results/reports from your physician's office. PCP__________________Phone___________H&P Appt. Date________             13 If you  have a Living Will and Durable Power of  for Healthcare, please bring in a copy. 15. Notify your Surgeon if you develop any illness between now and surgery  time, cough, cold, fever, sore throat, nausea, vomiting, etc.  Please notify your surgeon if you experience dizziness, shortness of breath or blurred vision between now & the time of your surgery             15. DO NOT shave your operative site 96 hours prior to surgery. For face & neck surgery, men may use an electric razor 48 hours prior to surgery. 16. Shower the night before or morning of surgery using an antibacterial soap or as you have been instructed. 17. To provide excellent care visitors will be limited to one in the room at any given time.              18.  Please bring picture ID and insurance card. 19.  Visit our web site for additional information:  [email or web address]              20.During flu season no children under the age of 15 are permitted in the hospital for the safety of all patients. 21. If you take a long acting insulin in the evening only  take half of your usual  dose the night  before your procedure              22. If you use a c-pap please bring DOS if staying overnight,             23.For your convenience [facility] has a pharmacy on site to fill your prescriptions. 24. If you use oxygen and have a portable tank please bring it  with you the DOS             25. Bring a complete list of all your medications with name and dose include any supplements. 26. Other__________________________________________   *Please call pre admission testing if you any further questions   [facility]   [address]    [address]  [phone]   [facility]    __ Wong Bibi _____  _X_ Scheduled _3/12/2021__ Where _[place]__   __ Other __________      OHQCDZO POLICY(subject to change)    There is a one visitor policy at [facility] for all surgeries and endoscopies. Whether the visitor can stay or will be asked to wait in the car will depend on the current policy and if social distancing can be maintained. The policy is subject to change at any time. Please make sure the visitor has a cell phone that is on,charged and able to accept calls, as this may be the way that the staff communicates with them. Pain management is NO VISITOR policyThe patients ride is expected to remain in the car with a cell phone for communication. If the ride is leaving the hospital grounds please make sure they are back in time for pickup. Have the patient inform the staff on arrival what their rides plans are while the patient is in the facility. At the [facility] there is one visitor allowed. Please note that the visitor policy is subject to change. All above information reviewed with patient in person or by phone. Patient verbalizes understanding. All questions and concerns addressed.                                                                                                  Patient/Rep___PATIENT_________________                                                                                                                                    PRE OP INSTRUCTIONS

## 2021-03-12 ENCOUNTER — OFFICE VISIT (OUTPATIENT)
Dept: PRIMARY CARE CLINIC | Age: 71
End: 2021-03-12
Payer: MEDICARE

## 2021-03-12 DIAGNOSIS — Z20.828 EXPOSURE TO SARS-ASSOCIATED CORONAVIRUS: Primary | ICD-10-CM

## 2021-03-12 PROCEDURE — G8428 CUR MEDS NOT DOCUMENT: HCPCS | Performed by: NURSE PRACTITIONER

## 2021-03-12 PROCEDURE — 99211 OFF/OP EST MAY X REQ PHY/QHP: CPT | Performed by: NURSE PRACTITIONER

## 2021-03-12 PROCEDURE — G8417 CALC BMI ABV UP PARAM F/U: HCPCS | Performed by: NURSE PRACTITIONER

## 2021-03-12 NOTE — PROGRESS NOTES
Padilla Bowman received a viral test for COVID-19. They were educated on isolation and quarantine as appropriate. For any symptoms, they were directed to seek care from their PCP, given contact information to establish with a doctor, directed to an urgent care or the emergency room.

## 2021-03-13 LAB — SARS-COV-2: NOT DETECTED

## 2021-03-15 ENCOUNTER — HOSPITAL ENCOUNTER (OUTPATIENT)
Age: 71
Setting detail: OUTPATIENT SURGERY
Discharge: HOME | End: 2021-03-15
Attending: UROLOGY | Admitting: UROLOGY
Payer: MEDICARE

## 2021-03-15 ENCOUNTER — ANESTHESIA (OUTPATIENT)
Dept: OPERATING ROOM | Age: 71
End: 2021-03-15
Payer: MEDICARE

## 2021-03-15 ENCOUNTER — ANESTHESIA EVENT (OUTPATIENT)
Dept: OPERATING ROOM | Age: 71
End: 2021-03-15
Payer: MEDICARE

## 2021-03-15 VITALS
DIASTOLIC BLOOD PRESSURE: 71 MMHG | RESPIRATION RATE: 19 BRPM | TEMPERATURE: 97.3 F | SYSTOLIC BLOOD PRESSURE: 150 MMHG | OXYGEN SATURATION: 96 %

## 2021-03-15 VITALS
WEIGHT: 270 LBS | BODY MASS INDEX: 37.8 KG/M2 | DIASTOLIC BLOOD PRESSURE: 74 MMHG | RESPIRATION RATE: 19 BRPM | HEIGHT: 71 IN | TEMPERATURE: 97.4 F | OXYGEN SATURATION: 95 % | SYSTOLIC BLOOD PRESSURE: 122 MMHG | HEART RATE: 67 BPM

## 2021-03-15 LAB
ANION GAP SERPL CALCULATED.3IONS-SCNC: 10 MMOL/L (ref 3–16)
BUN BLDV-MCNC: 22 MG/DL (ref 7–20)
CALCIUM SERPL-MCNC: 9.1 MG/DL (ref 8.3–10.6)
CHLORIDE BLD-SCNC: 103 MMOL/L (ref 99–110)
CO2: 22 MMOL/L (ref 21–32)
CREAT SERPL-MCNC: 0.9 MG/DL (ref 0.8–1.3)
EKG ATRIAL RATE: 61 BPM
EKG DIAGNOSIS: NORMAL
EKG P AXIS: -21 DEGREES
EKG P-R INTERVAL: 132 MS
EKG Q-T INTERVAL: 406 MS
EKG QRS DURATION: 84 MS
EKG QTC CALCULATION (BAZETT): 408 MS
EKG R AXIS: -23 DEGREES
EKG T AXIS: 55 DEGREES
EKG VENTRICULAR RATE: 61 BPM
GFR AFRICAN AMERICAN: >60
GFR NON-AFRICAN AMERICAN: >60
GLUCOSE BLD-MCNC: 110 MG/DL (ref 70–99)
GLUCOSE BLD-MCNC: 114 MG/DL (ref 70–99)
GLUCOSE BLD-MCNC: 130 MG/DL (ref 70–99)
HCT VFR BLD CALC: 38.5 % (ref 40.5–52.5)
HEMOGLOBIN: 12.4 G/DL (ref 13.5–17.5)
MCH RBC QN AUTO: 24.2 PG (ref 26–34)
MCHC RBC AUTO-ENTMCNC: 32.3 G/DL (ref 31–36)
MCV RBC AUTO: 74.9 FL (ref 80–100)
PDW BLD-RTO: 17.1 % (ref 12.4–15.4)
PERFORMED ON: ABNORMAL
PERFORMED ON: ABNORMAL
PLATELET # BLD: 172 K/UL (ref 135–450)
PMV BLD AUTO: 8 FL (ref 5–10.5)
POTASSIUM SERPL-SCNC: 4.1 MMOL/L (ref 3.5–5.1)
RBC # BLD: 5.14 M/UL (ref 4.2–5.9)
SODIUM BLD-SCNC: 135 MMOL/L (ref 136–145)
WBC # BLD: 8.5 K/UL (ref 4–11)

## 2021-03-15 PROCEDURE — 6370000000 HC RX 637 (ALT 250 FOR IP)

## 2021-03-15 PROCEDURE — 88305 TISSUE EXAM BY PATHOLOGIST: CPT

## 2021-03-15 PROCEDURE — 85027 COMPLETE CBC AUTOMATED: CPT

## 2021-03-15 PROCEDURE — 80048 BASIC METABOLIC PNL TOTAL CA: CPT

## 2021-03-15 PROCEDURE — 93005 ELECTROCARDIOGRAM TRACING: CPT | Performed by: ANESTHESIOLOGY

## 2021-03-15 PROCEDURE — 3700000001 HC ADD 15 MINUTES (ANESTHESIA): Performed by: UROLOGY

## 2021-03-15 PROCEDURE — 2580000003 HC RX 258: Performed by: UROLOGY

## 2021-03-15 PROCEDURE — 7100000000 HC PACU RECOVERY - FIRST 15 MIN: Performed by: UROLOGY

## 2021-03-15 PROCEDURE — 6360000002 HC RX W HCPCS: Performed by: ANESTHESIOLOGY

## 2021-03-15 PROCEDURE — 2580000003 HC RX 258: Performed by: NURSE ANESTHETIST, CERTIFIED REGISTERED

## 2021-03-15 PROCEDURE — 2709999900 HC NON-CHARGEABLE SUPPLY: Performed by: UROLOGY

## 2021-03-15 PROCEDURE — 36415 COLL VENOUS BLD VENIPUNCTURE: CPT

## 2021-03-15 PROCEDURE — 7100000010 HC PHASE II RECOVERY - FIRST 15 MIN: Performed by: UROLOGY

## 2021-03-15 PROCEDURE — C1769 GUIDE WIRE: HCPCS | Performed by: UROLOGY

## 2021-03-15 PROCEDURE — 3700000000 HC ANESTHESIA ATTENDED CARE: Performed by: UROLOGY

## 2021-03-15 PROCEDURE — 3600000004 HC SURGERY LEVEL 4 BASE: Performed by: UROLOGY

## 2021-03-15 PROCEDURE — 3600000014 HC SURGERY LEVEL 4 ADDTL 15MIN: Performed by: UROLOGY

## 2021-03-15 PROCEDURE — 7100000011 HC PHASE II RECOVERY - ADDTL 15 MIN: Performed by: UROLOGY

## 2021-03-15 PROCEDURE — 6360000002 HC RX W HCPCS: Performed by: UROLOGY

## 2021-03-15 PROCEDURE — 6360000002 HC RX W HCPCS: Performed by: NURSE ANESTHETIST, CERTIFIED REGISTERED

## 2021-03-15 PROCEDURE — 2500000003 HC RX 250 WO HCPCS: Performed by: NURSE ANESTHETIST, CERTIFIED REGISTERED

## 2021-03-15 PROCEDURE — 2720000010 HC SURG SUPPLY STERILE: Performed by: UROLOGY

## 2021-03-15 PROCEDURE — 93010 ELECTROCARDIOGRAM REPORT: CPT | Performed by: INTERNAL MEDICINE

## 2021-03-15 PROCEDURE — 88307 TISSUE EXAM BY PATHOLOGIST: CPT

## 2021-03-15 PROCEDURE — 7100000001 HC PACU RECOVERY - ADDTL 15 MIN: Performed by: UROLOGY

## 2021-03-15 RX ORDER — FENTANYL CITRATE 50 UG/ML
INJECTION, SOLUTION INTRAMUSCULAR; INTRAVENOUS PRN
Status: DISCONTINUED | OUTPATIENT
Start: 2021-03-15 | End: 2021-03-15 | Stop reason: SDUPTHER

## 2021-03-15 RX ORDER — PROMETHAZINE HYDROCHLORIDE 25 MG/ML
6.25 INJECTION, SOLUTION INTRAMUSCULAR; INTRAVENOUS EVERY 30 MIN PRN
Status: DISCONTINUED | OUTPATIENT
Start: 2021-03-15 | End: 2021-03-15 | Stop reason: HOSPADM

## 2021-03-15 RX ORDER — HYDROCODONE BITARTRATE AND ACETAMINOPHEN 5; 325 MG/1; MG/1
2 TABLET ORAL PRN
Status: DISCONTINUED | OUTPATIENT
Start: 2021-03-15 | End: 2021-03-15 | Stop reason: HOSPADM

## 2021-03-15 RX ORDER — HYDROMORPHONE HCL 110MG/55ML
0.5 PATIENT CONTROLLED ANALGESIA SYRINGE INTRAVENOUS EVERY 5 MIN PRN
Status: DISCONTINUED | OUTPATIENT
Start: 2021-03-15 | End: 2021-03-15 | Stop reason: HOSPADM

## 2021-03-15 RX ORDER — HYDROCODONE BITARTRATE AND ACETAMINOPHEN 5; 325 MG/1; MG/1
1 TABLET ORAL PRN
Status: DISCONTINUED | OUTPATIENT
Start: 2021-03-15 | End: 2021-03-15 | Stop reason: HOSPADM

## 2021-03-15 RX ORDER — FENTANYL CITRATE 50 UG/ML
50 INJECTION, SOLUTION INTRAMUSCULAR; INTRAVENOUS EVERY 5 MIN PRN
Status: DISCONTINUED | OUTPATIENT
Start: 2021-03-15 | End: 2021-03-15 | Stop reason: HOSPADM

## 2021-03-15 RX ORDER — LIDOCAINE HYDROCHLORIDE 10 MG/ML
0.5 INJECTION, SOLUTION EPIDURAL; INFILTRATION; INTRACAUDAL; PERINEURAL ONCE
Status: DISCONTINUED | OUTPATIENT
Start: 2021-03-15 | End: 2021-03-15 | Stop reason: HOSPADM

## 2021-03-15 RX ORDER — MEPERIDINE HYDROCHLORIDE 25 MG/ML
12.5 INJECTION INTRAMUSCULAR; INTRAVENOUS; SUBCUTANEOUS EVERY 5 MIN PRN
Status: DISCONTINUED | OUTPATIENT
Start: 2021-03-15 | End: 2021-03-15 | Stop reason: HOSPADM

## 2021-03-15 RX ORDER — LIDOCAINE HYDROCHLORIDE 10 MG/ML
1 INJECTION, SOLUTION EPIDURAL; INFILTRATION; INTRACAUDAL; PERINEURAL
Status: DISCONTINUED | OUTPATIENT
Start: 2021-03-15 | End: 2021-03-15 | Stop reason: HOSPADM

## 2021-03-15 RX ORDER — ONDANSETRON 2 MG/ML
INJECTION INTRAMUSCULAR; INTRAVENOUS PRN
Status: DISCONTINUED | OUTPATIENT
Start: 2021-03-15 | End: 2021-03-15 | Stop reason: SDUPTHER

## 2021-03-15 RX ORDER — KETOROLAC TROMETHAMINE 30 MG/ML
INJECTION, SOLUTION INTRAMUSCULAR; INTRAVENOUS PRN
Status: DISCONTINUED | OUTPATIENT
Start: 2021-03-15 | End: 2021-03-15 | Stop reason: SDUPTHER

## 2021-03-15 RX ORDER — FENTANYL CITRATE 50 UG/ML
25 INJECTION, SOLUTION INTRAMUSCULAR; INTRAVENOUS EVERY 5 MIN PRN
Status: DISCONTINUED | OUTPATIENT
Start: 2021-03-15 | End: 2021-03-15 | Stop reason: HOSPADM

## 2021-03-15 RX ORDER — SODIUM CHLORIDE 0.9 % (FLUSH) 0.9 %
10 SYRINGE (ML) INJECTION EVERY 12 HOURS SCHEDULED
Status: DISCONTINUED | OUTPATIENT
Start: 2021-03-15 | End: 2021-03-15 | Stop reason: HOSPADM

## 2021-03-15 RX ORDER — SODIUM CHLORIDE 9 MG/ML
INJECTION, SOLUTION INTRAVENOUS CONTINUOUS PRN
Status: DISCONTINUED | OUTPATIENT
Start: 2021-03-15 | End: 2021-03-15 | Stop reason: SDUPTHER

## 2021-03-15 RX ORDER — MAGNESIUM HYDROXIDE 1200 MG/15ML
LIQUID ORAL
Status: COMPLETED | OUTPATIENT
Start: 2021-03-15 | End: 2021-03-15

## 2021-03-15 RX ORDER — EPHEDRINE SULFATE/0.9% NACL/PF 50 MG/5 ML
SYRINGE (ML) INTRAVENOUS PRN
Status: DISCONTINUED | OUTPATIENT
Start: 2021-03-15 | End: 2021-03-15 | Stop reason: SDUPTHER

## 2021-03-15 RX ORDER — ALBUTEROL SULFATE 2.5 MG/3ML
2.5 SOLUTION RESPIRATORY (INHALATION) ONCE
Status: COMPLETED | OUTPATIENT
Start: 2021-03-15 | End: 2021-03-15

## 2021-03-15 RX ORDER — IPRATROPIUM BROMIDE AND ALBUTEROL SULFATE 2.5; .5 MG/3ML; MG/3ML
SOLUTION RESPIRATORY (INHALATION)
Status: COMPLETED
Start: 2021-03-15 | End: 2021-03-15

## 2021-03-15 RX ORDER — SODIUM CHLORIDE 0.9 % (FLUSH) 0.9 %
10 SYRINGE (ML) INJECTION PRN
Status: DISCONTINUED | OUTPATIENT
Start: 2021-03-15 | End: 2021-03-15 | Stop reason: HOSPADM

## 2021-03-15 RX ORDER — DEXAMETHASONE SODIUM PHOSPHATE 4 MG/ML
INJECTION, SOLUTION INTRA-ARTICULAR; INTRALESIONAL; INTRAMUSCULAR; INTRAVENOUS; SOFT TISSUE PRN
Status: DISCONTINUED | OUTPATIENT
Start: 2021-03-15 | End: 2021-03-15 | Stop reason: SDUPTHER

## 2021-03-15 RX ORDER — DIPHENHYDRAMINE HYDROCHLORIDE 50 MG/ML
12.5 INJECTION INTRAMUSCULAR; INTRAVENOUS
Status: DISCONTINUED | OUTPATIENT
Start: 2021-03-15 | End: 2021-03-15 | Stop reason: HOSPADM

## 2021-03-15 RX ORDER — PROPOFOL 10 MG/ML
INJECTION, EMULSION INTRAVENOUS PRN
Status: DISCONTINUED | OUTPATIENT
Start: 2021-03-15 | End: 2021-03-15 | Stop reason: SDUPTHER

## 2021-03-15 RX ORDER — SODIUM CHLORIDE 9 MG/ML
INJECTION, SOLUTION INTRAVENOUS CONTINUOUS
Status: DISCONTINUED | OUTPATIENT
Start: 2021-03-15 | End: 2021-03-15 | Stop reason: HOSPADM

## 2021-03-15 RX ORDER — HYDROMORPHONE HCL 110MG/55ML
0.25 PATIENT CONTROLLED ANALGESIA SYRINGE INTRAVENOUS EVERY 5 MIN PRN
Status: DISCONTINUED | OUTPATIENT
Start: 2021-03-15 | End: 2021-03-15 | Stop reason: HOSPADM

## 2021-03-15 RX ORDER — CIPROFLOXACIN 2 MG/ML
400 INJECTION, SOLUTION INTRAVENOUS
Status: COMPLETED | OUTPATIENT
Start: 2021-03-15 | End: 2021-03-15

## 2021-03-15 RX ORDER — MAGNESIUM HYDROXIDE 1200 MG/15ML
LIQUID ORAL CONTINUOUS PRN
Status: COMPLETED | OUTPATIENT
Start: 2021-03-15 | End: 2021-03-15

## 2021-03-15 RX ORDER — IPRATROPIUM BROMIDE AND ALBUTEROL SULFATE 2.5; .5 MG/3ML; MG/3ML
1 SOLUTION RESPIRATORY (INHALATION) ONCE
Status: COMPLETED | OUTPATIENT
Start: 2021-03-15 | End: 2021-03-15

## 2021-03-15 RX ORDER — LIDOCAINE HYDROCHLORIDE 20 MG/ML
INJECTION, SOLUTION EPIDURAL; INFILTRATION; INTRACAUDAL; PERINEURAL PRN
Status: DISCONTINUED | OUTPATIENT
Start: 2021-03-15 | End: 2021-03-15 | Stop reason: SDUPTHER

## 2021-03-15 RX ORDER — SUCCINYLCHOLINE/SOD CL,ISO/PF 200MG/10ML
SYRINGE (ML) INTRAVENOUS PRN
Status: DISCONTINUED | OUTPATIENT
Start: 2021-03-15 | End: 2021-03-15 | Stop reason: SDUPTHER

## 2021-03-15 RX ADMIN — Medication 10 MG: at 09:50

## 2021-03-15 RX ADMIN — Medication 170 MG: at 09:05

## 2021-03-15 RX ADMIN — Medication 10 MG: at 09:21

## 2021-03-15 RX ADMIN — SUGAMMADEX 200 MG: 100 INJECTION, SOLUTION INTRAVENOUS at 10:06

## 2021-03-15 RX ADMIN — ALBUTEROL SULFATE 2.5 MG: 2.5 SOLUTION RESPIRATORY (INHALATION) at 08:38

## 2021-03-15 RX ADMIN — DEXAMETHASONE SODIUM PHOSPHATE 4 MG: 4 INJECTION, SOLUTION INTRAMUSCULAR; INTRAVENOUS at 09:11

## 2021-03-15 RX ADMIN — Medication 10 MG: at 09:41

## 2021-03-15 RX ADMIN — FENTANYL CITRATE 50 MCG: 50 INJECTION INTRAMUSCULAR; INTRAVENOUS at 10:10

## 2021-03-15 RX ADMIN — ONDANSETRON 4 MG: 2 INJECTION INTRAMUSCULAR; INTRAVENOUS at 09:11

## 2021-03-15 RX ADMIN — SODIUM CHLORIDE: 9 INJECTION, SOLUTION INTRAVENOUS at 09:00

## 2021-03-15 RX ADMIN — SODIUM CHLORIDE: 9 INJECTION, SOLUTION INTRAVENOUS at 08:29

## 2021-03-15 RX ADMIN — FENTANYL CITRATE 50 MCG: 50 INJECTION INTRAMUSCULAR; INTRAVENOUS at 09:05

## 2021-03-15 RX ADMIN — IPRATROPIUM BROMIDE AND ALBUTEROL SULFATE 1 AMPULE: 2.5; .5 SOLUTION RESPIRATORY (INHALATION) at 10:37

## 2021-03-15 RX ADMIN — Medication 10 MG: at 09:32

## 2021-03-15 RX ADMIN — PROPOFOL 150 MG: 10 INJECTION, EMULSION INTRAVENOUS at 09:05

## 2021-03-15 RX ADMIN — LIDOCAINE HYDROCHLORIDE 100 MG: 20 INJECTION, SOLUTION EPIDURAL; INFILTRATION; INTRACAUDAL; PERINEURAL at 09:05

## 2021-03-15 RX ADMIN — IPRATROPIUM BROMIDE AND ALBUTEROL SULFATE 1 AMPULE: .5; 3 SOLUTION RESPIRATORY (INHALATION) at 10:37

## 2021-03-15 RX ADMIN — CIPROFLOXACIN 400 MG: 2 INJECTION, SOLUTION INTRAVENOUS at 08:58

## 2021-03-15 RX ADMIN — KETOROLAC TROMETHAMINE 15 MG: 30 INJECTION, SOLUTION INTRAMUSCULAR; INTRAVENOUS at 09:11

## 2021-03-15 ASSESSMENT — PULMONARY FUNCTION TESTS
PIF_VALUE: 0
PIF_VALUE: 21
PIF_VALUE: 22
PIF_VALUE: 21
PIF_VALUE: 19
PIF_VALUE: 1
PIF_VALUE: 17
PIF_VALUE: 2
PIF_VALUE: 21
PIF_VALUE: 22
PIF_VALUE: 24
PIF_VALUE: 1
PIF_VALUE: 22
PIF_VALUE: 21
PIF_VALUE: 21
PIF_VALUE: 19
PIF_VALUE: 23
PIF_VALUE: 22
PIF_VALUE: 21
PIF_VALUE: 21
PIF_VALUE: 19
PIF_VALUE: 20
PIF_VALUE: 1
PIF_VALUE: 1
PIF_VALUE: 21
PIF_VALUE: 23
PIF_VALUE: 23
PIF_VALUE: 20
PIF_VALUE: 19
PIF_VALUE: 5
PIF_VALUE: 24
PIF_VALUE: 22
PIF_VALUE: 21
PIF_VALUE: 20
PIF_VALUE: 22
PIF_VALUE: 21
PIF_VALUE: 22
PIF_VALUE: 21
PIF_VALUE: 0
PIF_VALUE: 1
PIF_VALUE: 20
PIF_VALUE: 19

## 2021-03-15 ASSESSMENT — ENCOUNTER SYMPTOMS: SHORTNESS OF BREATH: 1

## 2021-03-15 NOTE — H&P
Urology History and Physical  Inpatient Setting - [facility]    Provider: Courtney Donahue MD Patient ID:  Admission Date: 3/15/2021 Name: Yunior Odom  OR Date: n/a MRN: [id number]   Patient Location: OR/NONE : 1950  Attending: Courtney Donahue MD Date of Service: 3/15/2021  PCP: Ludy Steele MD     Diagnoses:  [address]  BPH  Bladder lesion    Assessment/Plan:  81 yo M with GH and lesion at bladder neck likely prostate adenoma. Prior TURP aborted 2/2 bleeding several years ago. Persistent bleeding.    - to OR for TURP  - All rba discussed  - discussed jewell post op and possible overnight if significant bleeding    All the patients questions were answered in detail. He understands the plan as listed above. · Review/order of labs  · Review/order of radiology studies    Total time spent face-to-face with the patient 5 min, including greater than 50% of this time in discussion with the patient/family concerning the following:  · Recommended tests  · management options  · risks/benefits of management options  · importance of compliance  · Prognosis  · Risk factor reduction  · Patient/family education                                                                                                                                                      CC: GH      HPI: Yunior Odom is a 80 y.o. male. I saw the patient today for gross hematuria, and associated symptoms of bladder lesion seen on cysto, that have been present for months. Symptoms relieved by nothing and aggravated by anticoagulation medication. He has tried the following treatments: cystoscopy.      Past medical History:   He has a past medical history of COPD (chronic obstructive pulmonary disease) ([address] Coronary artery disease involving native coronary artery of native heart with angina pectoris ([address] (2017), Depression, Diabetes mellitus ([facility] [address] Dysphagia, Enlarged prostate, Heart enlarged, Hyperlipidemia, Hypertension, MRSA (methicillin resistant staph aureus) culture positive (08/17/2018), Obesity, Pneumonia, and Sleep apnea. Past Surgical History:  He has a past surgical history that includes Prostate surgery; Gallbladder surgery; Dental surgery; Coronary angioplasty (01/11/2017); Cholecystectomy; and bronchoscopy (04/25/2018). Allergies: Allergies   Allergen Reactions    Lisinopril Other (See Comments)     COUGH       Social History:  He reports that he quit smoking about 10 years ago. His smoking use included cigarettes, pipe, and cigars. He has a 6.25 pack-year smoking history. He has quit using smokeless tobacco. He reports previous alcohol use. He reports that he does not use drugs. Family History:  family history includes COPD in his father and mother; Heart Disease in his father. Medications:   Scheduled Meds:   lidocaine PF  0.5 mL Intradermal Once    ciprofloxacin  400 mg Intravenous On Call to OR    sodium chloride flush  10 mL Intravenous 2 times per day     Continuous Infusions:   sodium chloride      sodium chloride       PRN Meds:meperidine, HYDROmorphone, HYDROmorphone, fentanNYL, fentanNYL, HYDROcodone 5 mg - acetaminophen **OR** HYDROcodone 5 mg - acetaminophen, promethazine, diphenhydrAMINE, sodium chloride flush, lidocaine PF    Review of Systems:  Constitutional: Negative for fever    Genitourinary: see HPI  Eyes: negative for sudden change in vision  EENT: no complaints  Cardiovascular: Negative for chest pain  Respiratory: Negative for shortness of breath  Gastrointestinal: Negative for nausea  Musculoskeletal: Negative for back pain   Neurological: Negative for weakness  Psychiatric: Negative for anxiety  Integumentary: Negative for rashes or adenopathy     Physical Exam:  There were no vitals filed for this visit. Constitutional: NAD, well-developed, well-nourished. HEENT: MMM. Hearing intact. PERRL  Neck: no thyroid masses appreciated. Trachea is midline.  Neck appears unremarkable

## 2021-03-15 NOTE — PROGRESS NOTES
Discharge instructions reviewed with pt and pts cousin, both verbalized understanding. Pt safely dressed and transferred self to wheelchair. IV removed without complications. Pt discharged in wheelchair with all belongings to car by Yani Mora RN. Pt tolerated well.

## 2021-03-15 NOTE — PROGRESS NOTES
Pt arrived from OR to PACU, awakens to voice, denies pain. VSS, O2 sats 98% on 6 L simple mask. 3 way jewell in place with CBI, infusing, urine pink tinged in color, no clots noted. Pt with audible expiratory wheezing, Dr. Felicitas Ervin notified and order received for duoneb breathing treatment. Will monitor.

## 2021-03-15 NOTE — ANESTHESIA PRE PROCEDURE
Department of Anesthesiology  Preprocedure Note       Name:  Virgil Solorzano   Age:  80 y.o.  :  1950                                          MRN:  [id number]         Date:  3/15/2021      Surgeon: Jose Bailey):  Daniel Wesley MD    Procedure: Procedure(s):  RIGID CYSTOSCOPY WITH BLADDER BIOPSY  WITH POSSIBLE TRANSURETHRAL RESECTION OF PROSTATE    Medications prior to admission:   Prior to Admission medications    Medication Sig Start Date End Date Taking?  Authorizing Provider   furosemide (LASIX) 40 MG tablet TAKE 1 TABLET BY MOUTH DAILY 21  Yes Jaxon Carter MD   umeclidinium-vilanterol (ANORO ELLIPTA) 62.5-25 MCG/INH AEPB inhaler Inhale 1 puff into the lungs daily INHALE 1 PUFF INTO THE LUNGS DAILY 21  Yes Melissa Andrews MD   ferrous sulfate (IRON 325) 325 (65 Fe) MG tablet TAKE 1 TABLET BY MOUTH DAILY WITH BREAKFAST 20  Yes Historical Provider, MD   FLUoxetine (PROZAC) 20 MG capsule TK 1 C PO D 20  Yes Historical Provider, MD   metoprolol succinate (TOPROL XL) 50 MG extended release tablet TAKE 1 TABLET BY MOUTH DAILY 20  Yes Jaxon Carter MD   albuterol sulfate HFA (PROVENTIL HFA) 108 (90 Base) MCG/ACT inhaler Inhale 2 puffs into the lungs every 4 hours as needed for Wheezing 20  Yes Melissa Andrews MD   losartan (COZAAR) 100 MG tablet Take 1 tablet by mouth daily 20  Yes Jaxon Carter MD   spironolactone (ALDACTONE) 25 MG tablet Take 1 tablet by mouth daily 20  Yes Jaxon Carter MD   clonazePAM (KLONOPIN) 1 MG tablet Take 1 mg by mouth 2 times daily as needed for Anxiety   Yes Historical Provider, MD   metFORMIN (GLUCOPHAGE) 1000 MG tablet Take 1,000 mg by mouth 2 times daily (with meals)    Yes Historical Provider, MD   simvastatin (ZOCOR) 40 MG tablet Take 0.5 tablets by mouth nightly 5/15/15  Yes ALEXEI oDng - CNP   doxycycline hyclate (VIBRA-TABS) 100 MG tablet Take 1 tablet by mouth daily for 10 days 3/8/21 3/18/21  Russell Zeny  Acute bronchitis J20.9    Chronic bronchitis (HCC) J42    Bruit R09.89    Chronic cough R05    Coronary artery disease involving native coronary artery of native heart with angina pectoris ([facility]) I25.119    Cardiomyopathy ([facility]) I42.9    Hemoptysis R04.2    Pneumonia due to infectious organism J18.9    COPD, severe ([facility] J44.9       Past Medical History:        Diagnosis Date    [facility]     Coronary artery disease involving native coronary artery of native heart with angina pectoris ([facility] 1/18/2017    Depression     Diabetes mellitus ([facility]     type II    Dysphagia     Enlarged prostate     Heart enlarged     Hyperlipidemia     Hypertension     MRSA (methicillin resistant staph aureus) culture positive 08/17/2018    sputum    Obesity     Pneumonia     multiple times 2018    Sleep apnea     has cpap but hasn't used \"in a long time\"       Past Surgical History:        Procedure Laterality Date    BRONCHOSCOPY  04/25/2018    CHOLECYSTECTOMY      CORONARY ANGIOPLASTY  01/11/2017    DENTAL SURGERY      teeth removed    GALLBLADDER SURGERY      gall stones    PROSTATE SURGERY         Social History:    Social History     Tobacco Use    Smoking status: Former Smoker     Packs/day: 0.25     Years: 25.00     Pack years: 6.25     Types: Cigarettes, Pipe, Cigars     Quit date: 1/1/2011     Years since quitting: 10.2    Smokeless tobacco: Former User   Substance Use Topics    Alcohol use: Not Currently                                Counseling given: Not Answered      Vital Signs (Current):   Vitals:    03/11/21 1030   Weight: 270 lb (122.5 kg)   Height: 5' 11\" (1.803 m)                                              BP Readings from Last 3 Encounters:   12/16/20 108/70   06/03/20 130/70   03/04/20 112/70       NPO Status:                                                                                 BMI:   Wt Readings from Last 3 Encounters:   03/11/21 270 lb (122.5 kg)   12/16/20 284 lb (128.8 kg)   06/03/20 277 lb (125.6 kg)     Body mass index is 37.66 kg/m². CBC:   Lab Results   Component Value Date    WBC 7.1 09/10/2019    RBC 5.21 09/10/2019    HGB 13.0 09/10/2019    HCT 40.0 09/10/2019    MCV 76.8 09/10/2019    RDW 16.7 09/10/2019     09/10/2019       CMP:   Lab Results   Component Value Date     09/10/2019    K 3.8 09/10/2019    K 3.6 04/19/2018     09/10/2019    CO2 22 09/10/2019    BUN 22 09/10/2019    CREATININE 0.9 09/10/2019    GFRAA >60 09/10/2019    GFRAA >60 09/13/2012    AGRATIO 1.1 09/10/2019    LABGLOM >60 09/10/2019    GLUCOSE 107 09/10/2019    GLUCOSE 108 12/11/2018    PROT 7.6 09/10/2019    CALCIUM 9.2 09/10/2019    BILITOT 0.5 09/10/2019    ALKPHOS 96 09/10/2019    AST 19 09/10/2019    ALT 13 09/10/2019       POC Tests: No results for input(s): POCGLU, POCNA, POCK, POCCL, POCBUN, POCHEMO, POCHCT in the last 72 hours.     Coags:   Lab Results   Component Value Date    PROTIME 12.4 04/25/2018    INR 1.10 04/25/2018    APTT 29.4 04/25/2018       HCG (If Applicable): No results found for: PREGTESTUR, PREGSERUM, HCG, HCGQUANT     ABGs: No results found for: PHART, PO2ART, ADG4HPD, KYA1BEU, BEART, R3XJWJVN     Type & Screen (If Applicable):  No results found for: LABABO, LABRH    Drug/Infectious Status (If Applicable):  No results found for: HIV, HEPCAB    COVID-19 Screening (If Applicable):   Lab Results   Component Value Date    COVID19 Not Detected 03/12/2021           Anesthesia Evaluation  Patient summary reviewed and Nursing notes reviewed  Airway: Mallampati: II  TM distance: >3 FB   Neck ROM: full  Mouth opening: > = 3 FB Dental:          Pulmonary:   (+) COPD:  shortness of breath:  sleep apnea: on CPAP,                             Cardiovascular:  Exercise tolerance: good (>4 METS),   (+) hypertension: moderate, angina: with exertion, CAD: non-obstructive,                   Neuro/Psych:   (+) psychiatric history: GI/Hepatic/Renal:             Endo/Other:    (+) DiabetesType II DM, well controlled, , .                 Abdominal:           Vascular:                                      Anesthesia Plan      MAC     ASA 3       Induction: intravenous. MIPS: Prophylactic antiemetics administered. Anesthetic plan and risks discussed with patient. Plan discussed with CRNA.                   Rose Eduardo MD   3/15/2021

## 2021-03-15 NOTE — ANESTHESIA POSTPROCEDURE EVALUATION
Department of Anesthesiology  Postprocedure Note    Patient: Constantino Chowdhury  MRN: [id number]  YOB: 1950  Date of evaluation: 3/15/2021  Time:  10:24 AM     Procedure Summary     Date: 03/15/21 Room / Location: [address] [place] [address]    Anesthesia Start: 0900 Anesthesia Stop:     Procedures:       RIGID CYSTOSCOPY WITH RESECTION OF BLADDER TUMOR (N/A )      WITH TRANSURETHRAL RESECTION OF PROSTATE (N/A ) Diagnosis:       (R31.  GROSS HEMATURIA)      (N40.  ENLARGED PROSTATE WITH LOWER URINARY TRACT SYMPTOMS)    Surgeons: Evelia Puckett MD Responsible Provider: Dereck Velez MD    Anesthesia Type: MAC ASA Status: 3          Anesthesia Type: No value filed. Jason Phase I: Jason Score: 10    Jason Phase II:      Last vitals: Reviewed and per EMR flowsheets.        Anesthesia Post Evaluation    Patient location during evaluation: PACU  Patient participation: complete - patient participated  Level of consciousness: awake and awake and alert  Pain score: 2  Airway patency: patent  Nausea & Vomiting: no vomiting  Complications: no  Cardiovascular status: blood pressure returned to baseline  Respiratory status: acceptable  Hydration status: euvolemic

## 2021-03-15 NOTE — PROGRESS NOTES
Pt resting quietly in bed, awake, denies pain. VSS, O2 sats 97% on room air. Mcgowan in place draining pink tinged urine. CBI port plugged per order from Dr. Svitlana Petersen. Pt seen by anesthesia, phase 1 criteria met. Will transfer pt into phase 2 for discharge per orders.

## 2021-03-15 NOTE — OP NOTE
Urology Operative Report  [facility]    Provider: Courtney Donahue MD Patient ID:  Admission Date: 3/15/2021 Name: Yunior Odom  OR Date: 3/15/2021  MRN: [id number]   Patient Location: OR/NONE : 1950  Attending: Courtney Donahue MD Date of Service: 3/15/2021  PCP: Ludy Steele MD     Date of Operation: 3/15/2021    Preoperative Diagnosis: BPH w obstruction    Postoperative Diagnosis: same    Procedure:    1. Cystoscopy with Transurethral Resection of the Prostate  2. TURBT (small ~1 cm at left trigone)    Surgeon:   Courtney Donahue MD    Anesthesia: General endotracheal anesthesia    Indications: Yunior Odom is a 80 y.o. male who presents for the above named surgery. Informed consent was obtained and the risks, benefits, and details of the procedure were explained to the patient who elected to proceed. Details of Procedure: The patient was brought to the operating room and placed in the supine position on the operating room table. SCDs were placed on the lower extremities. Following induction of anesthesia the patient was positioned in a lithotomy position, all pressure points were padded, and the genitals were prepped and draped in the usual sterile fashion. A routine timeout was performed, confirming the patient, procedure, site, risk of fire, patient allergies and confirming that preoperative antibiotics had been administered prior to beginning. A 21 fr rigid cystoscope was advanced via a normal appearing urethra into the bladder. The bladder was inspected systematically with a 30 degree and 70 degree lenses. The right UO was seen easily, and we were unable to visualize the LEFT ureteral orifice. The prostate was significantly obstructive with several regrowth adenomas. There was a 1 cm papillary appearing bladder tumor identified at the left trigone/bladder neck.  My differential was low grade bladder cancer vs. Chronic irritation from prostate adenoma which was adjacent to this. The cystoscope was removed and a resectoscope advanced into the bladder. Using cutting current resection was done systematically until all visible tumor had been resected and evacuated from the bladder. Hemostasis was ensured by filling under low bladder pressure. All tumor chips were removed. The left ureteral orifice was then able to be seen. The resectoscope was then positioned and the resection of the prostate was done systematically  Focusing on the left large prostate adenoma which was completely obstructing the bladder. Bleeding was controlled as needed with cautery. We resected back to the verumontanum and care was taken to avoid resection distal to that location. When we were completed the hemostasis was ensured by filling under low bladder pressure. All chips were removed. The ureteral orifices were visualized and without injury. There was still significant prostate tissue but overall a much more wide open channel. The bladder was left full and the scope was removed. There was good urinary flow. A 22 Mcgowan was placed with return of light pink urine and 30 cc in the balloon    At the end of the procedure all counts were correct. The patient tolerated the procedure well and was transported to the PACU in stable condition. Findings:  ~1 cm papillary appearing bladder tumor at left trigone/bladder neck; ddx low grade bladder cancer vs. Chronic irritation adenoma. Turp to open channel focusing on large left lateral regrowth adenoma    Estimated Blood Loss: 50 cc                  Drains: 22 fr 3 way          Specimens:   1. bladder tumor  2. Prostate chips    Complications: none apparent           Disposition:  PACU - hemodynamically stable.     Plan: Follow up this week for VT, plan on ~1 week follow up to discuss pathology            Pamela Wagoner MD  3/15/2021

## 2021-03-17 ENCOUNTER — TELEPHONE (OUTPATIENT)
Dept: CARDIOLOGY CLINIC | Age: 71
End: 2021-03-17

## 2021-03-17 NOTE — TELEPHONE ENCOUNTER
Pt calling he had prostate surgery on 03/15 and went off his blood thinners on 03/08. Surgeon told him to resume blood thinners on 03/20 pt thinks that's a long time to be off them, wants to know what [id number] thinks? When should he restart them?   Pls call to advise Thank you

## 2021-03-17 NOTE — TELEPHONE ENCOUNTER
Dr. Mike Rosado is out please advise. Pt is concerned about being off his Plavix and ASA too long. He had a cysto with biopsy done on 3/15. Yesterday was the last day he had any blood in his urine. Pt wants to know if he should still hold till 3/20?

## 2021-03-17 NOTE — TELEPHONE ENCOUNTER
Since he has no blood in urine would be fine to restart plavix and aspirin.  Agree with him that it should be restarted

## 2021-04-11 ENCOUNTER — HOSPITAL ENCOUNTER (INPATIENT)
Age: 71
LOS: 3 days | Discharge: HOME | DRG: 663 | End: 2021-04-14
Attending: EMERGENCY MEDICINE | Admitting: INTERNAL MEDICINE
Payer: MEDICARE

## 2021-04-11 DIAGNOSIS — R33.9 URINARY RETENTION: Primary | ICD-10-CM

## 2021-04-11 PROBLEM — N39.0 UTI (URINARY TRACT INFECTION): Status: ACTIVE | Noted: 2021-04-11

## 2021-04-11 LAB
A/G RATIO: 1.4 (ref 1.1–2.2)
ALBUMIN SERPL-MCNC: 4.1 G/DL (ref 3.4–5)
ALP BLD-CCNC: 123 U/L (ref 40–129)
ALT SERPL-CCNC: 12 U/L (ref 10–40)
ANION GAP SERPL CALCULATED.3IONS-SCNC: 11 MMOL/L (ref 3–16)
ANISOCYTOSIS: ABNORMAL
APTT: 29.3 SEC (ref 24.2–36.2)
AST SERPL-CCNC: 13 U/L (ref 15–37)
BASOPHILS ABSOLUTE: 0 K/UL (ref 0–0.2)
BASOPHILS RELATIVE PERCENT: 0 %
BILIRUB SERPL-MCNC: 0.5 MG/DL (ref 0–1)
BILIRUBIN URINE: NEGATIVE
BLOOD, URINE: ABNORMAL
BUN BLDV-MCNC: 21 MG/DL (ref 7–20)
CALCIUM SERPL-MCNC: 9.1 MG/DL (ref 8.3–10.6)
CHLORIDE BLD-SCNC: 103 MMOL/L (ref 99–110)
CLARITY: ABNORMAL
CO2: 24 MMOL/L (ref 21–32)
COLOR: ABNORMAL
CREAT SERPL-MCNC: 1.4 MG/DL (ref 0.8–1.3)
EOSINOPHILS ABSOLUTE: 0 K/UL (ref 0–0.6)
EOSINOPHILS RELATIVE PERCENT: 0 %
GFR AFRICAN AMERICAN: >60
GFR NON-AFRICAN AMERICAN: 50
GLOBULIN: 2.9 G/DL
GLUCOSE BLD-MCNC: 129 MG/DL (ref 70–99)
GLUCOSE URINE: NEGATIVE MG/DL
HCT VFR BLD CALC: 34.7 % (ref 40.5–52.5)
HCT VFR BLD CALC: 38.9 % (ref 40.5–52.5)
HEMOGLOBIN: 11.3 G/DL (ref 13.5–17.5)
HEMOGLOBIN: 12.4 G/DL (ref 13.5–17.5)
HYPOCHROMIA: ABNORMAL
INR BLD: 1.11 (ref 0.86–1.14)
KETONES, URINE: NEGATIVE MG/DL
LEUKOCYTE ESTERASE, URINE: ABNORMAL
LYMPHOCYTES ABSOLUTE: 0.7 K/UL (ref 1–5.1)
LYMPHOCYTES RELATIVE PERCENT: 5 %
MCH RBC QN AUTO: 24.1 PG (ref 26–34)
MCH RBC QN AUTO: 24.5 PG (ref 26–34)
MCHC RBC AUTO-ENTMCNC: 32 G/DL (ref 31–36)
MCHC RBC AUTO-ENTMCNC: 32.5 G/DL (ref 31–36)
MCV RBC AUTO: 75.3 FL (ref 80–100)
MCV RBC AUTO: 75.3 FL (ref 80–100)
MICROCYTES: ABNORMAL
MICROSCOPIC EXAMINATION: YES
MONOCYTES ABSOLUTE: 0.4 K/UL (ref 0–1.3)
MONOCYTES RELATIVE PERCENT: 3 %
NEUTROPHILS ABSOLUTE: 12.9 K/UL (ref 1.7–7.7)
NEUTROPHILS RELATIVE PERCENT: 92 %
NITRITE, URINE: NEGATIVE
PDW BLD-RTO: 17.1 % (ref 12.4–15.4)
PDW BLD-RTO: 17.1 % (ref 12.4–15.4)
PH UA: 7 (ref 5–8)
PLATELET # BLD: 221 K/UL (ref 135–450)
PLATELET # BLD: 237 K/UL (ref 135–450)
PMV BLD AUTO: 7.9 FL (ref 5–10.5)
PMV BLD AUTO: 8.1 FL (ref 5–10.5)
POTASSIUM REFLEX MAGNESIUM: 4.1 MMOL/L (ref 3.5–5.1)
PROTEIN UA: >=300 MG/DL
PROTHROMBIN TIME: 12.9 SEC (ref 10–13.2)
RBC # BLD: 4.6 M/UL (ref 4.2–5.9)
RBC # BLD: 5.17 M/UL (ref 4.2–5.9)
RBC UA: >100 /HPF (ref 0–4)
SODIUM BLD-SCNC: 138 MMOL/L (ref 136–145)
SPECIFIC GRAVITY UA: 1.02 (ref 1–1.03)
TOTAL PROTEIN: 7 G/DL (ref 6.4–8.2)
URINE TYPE: ABNORMAL
UROBILINOGEN, URINE: 0.2 E.U./DL
WBC # BLD: 11.7 K/UL (ref 4–11)
WBC # BLD: 14 K/UL (ref 4–11)
WBC UA: ABNORMAL /HPF (ref 0–5)

## 2021-04-11 PROCEDURE — 85027 COMPLETE CBC AUTOMATED: CPT

## 2021-04-11 PROCEDURE — 85025 COMPLETE CBC W/AUTO DIFF WBC: CPT

## 2021-04-11 PROCEDURE — 85610 PROTHROMBIN TIME: CPT

## 2021-04-11 PROCEDURE — 80053 COMPREHEN METABOLIC PANEL: CPT

## 2021-04-11 PROCEDURE — 2580000003 HC RX 258: Performed by: INTERNAL MEDICINE

## 2021-04-11 PROCEDURE — 1200000000 HC SEMI PRIVATE

## 2021-04-11 PROCEDURE — 81001 URINALYSIS AUTO W/SCOPE: CPT

## 2021-04-11 PROCEDURE — 85730 THROMBOPLASTIN TIME PARTIAL: CPT

## 2021-04-11 PROCEDURE — 84153 ASSAY OF PSA TOTAL: CPT

## 2021-04-11 PROCEDURE — 36415 COLL VENOUS BLD VENIPUNCTURE: CPT

## 2021-04-11 PROCEDURE — 87086 URINE CULTURE/COLONY COUNT: CPT

## 2021-04-11 PROCEDURE — 6360000002 HC RX W HCPCS: Performed by: EMERGENCY MEDICINE

## 2021-04-11 PROCEDURE — 51702 INSERT TEMP BLADDER CATH: CPT

## 2021-04-11 PROCEDURE — 99284 EMERGENCY DEPT VISIT MOD MDM: CPT

## 2021-04-11 PROCEDURE — 96365 THER/PROPH/DIAG IV INF INIT: CPT

## 2021-04-11 RX ORDER — ATORVASTATIN CALCIUM 20 MG/1
20 TABLET, FILM COATED ORAL DAILY
Status: DISCONTINUED | OUTPATIENT
Start: 2021-04-12 | End: 2021-04-14 | Stop reason: HOSPADM

## 2021-04-11 RX ORDER — METOPROLOL SUCCINATE 50 MG/1
50 TABLET, EXTENDED RELEASE ORAL DAILY
Status: DISCONTINUED | OUTPATIENT
Start: 2021-04-12 | End: 2021-04-13

## 2021-04-11 RX ORDER — SODIUM CHLORIDE 9 MG/ML
INJECTION, SOLUTION INTRAVENOUS CONTINUOUS
Status: DISCONTINUED | OUTPATIENT
Start: 2021-04-11 | End: 2021-04-14 | Stop reason: HOSPADM

## 2021-04-11 RX ORDER — BUDESONIDE 0.5 MG/2ML
0.25 INHALANT ORAL 2 TIMES DAILY
Status: DISCONTINUED | OUTPATIENT
Start: 2021-04-11 | End: 2021-04-14 | Stop reason: HOSPADM

## 2021-04-11 RX ORDER — ASPIRIN 81 MG/1
81 TABLET ORAL DAILY
COMMUNITY

## 2021-04-11 RX ORDER — ROFLUMILAST 500 UG/1
TABLET ORAL
Qty: 30 TABLET | Refills: 5 | Status: CANCELLED | OUTPATIENT
Start: 2021-04-11

## 2021-04-11 RX ORDER — LOSARTAN POTASSIUM 100 MG/1
100 TABLET ORAL DAILY
Status: DISCONTINUED | OUTPATIENT
Start: 2021-04-12 | End: 2021-04-14 | Stop reason: HOSPADM

## 2021-04-11 RX ORDER — CLONAZEPAM 1 MG/1
0.5 TABLET ORAL 2 TIMES DAILY PRN
Status: DISCONTINUED | OUTPATIENT
Start: 2021-04-11 | End: 2021-04-14 | Stop reason: HOSPADM

## 2021-04-11 RX ORDER — IPRATROPIUM BROMIDE AND ALBUTEROL SULFATE 2.5; .5 MG/3ML; MG/3ML
1 SOLUTION RESPIRATORY (INHALATION) EVERY 4 HOURS PRN
Status: DISCONTINUED | OUTPATIENT
Start: 2021-04-11 | End: 2021-04-14 | Stop reason: HOSPADM

## 2021-04-11 RX ORDER — FLUOXETINE HYDROCHLORIDE 20 MG/1
20 CAPSULE ORAL DAILY
Status: DISCONTINUED | OUTPATIENT
Start: 2021-04-12 | End: 2021-04-14 | Stop reason: HOSPADM

## 2021-04-11 RX ORDER — ALBUTEROL SULFATE 2.5 MG/3ML
2.5 SOLUTION RESPIRATORY (INHALATION) EVERY 6 HOURS PRN
Status: DISCONTINUED | OUTPATIENT
Start: 2021-04-11 | End: 2021-04-14 | Stop reason: HOSPADM

## 2021-04-11 RX ORDER — SPIRONOLACTONE 25 MG/1
25 TABLET ORAL DAILY
Status: DISCONTINUED | OUTPATIENT
Start: 2021-04-12 | End: 2021-04-14 | Stop reason: HOSPADM

## 2021-04-11 RX ORDER — DOXAZOSIN MESYLATE 4 MG/1
4 TABLET ORAL NIGHTLY
Status: DISCONTINUED | OUTPATIENT
Start: 2021-04-11 | End: 2021-04-14 | Stop reason: HOSPADM

## 2021-04-11 RX ORDER — FERROUS SULFATE 325(65) MG
325 TABLET ORAL
Status: DISCONTINUED | OUTPATIENT
Start: 2021-04-12 | End: 2021-04-14 | Stop reason: HOSPADM

## 2021-04-11 RX ORDER — NITROGLYCERIN 0.4 MG/1
0.4 TABLET SUBLINGUAL EVERY 5 MIN PRN
Status: DISCONTINUED | OUTPATIENT
Start: 2021-04-11 | End: 2021-04-14 | Stop reason: HOSPADM

## 2021-04-11 RX ADMIN — SODIUM CHLORIDE: 9 INJECTION, SOLUTION INTRAVENOUS at 23:33

## 2021-04-11 RX ADMIN — Medication 1000 MG: at 19:15

## 2021-04-11 ASSESSMENT — PAIN SCALES - GENERAL
PAINLEVEL_OUTOF10: 7
PAINLEVEL_OUTOF10: 0
PAINLEVEL_OUTOF10: 0

## 2021-04-11 ASSESSMENT — PAIN DESCRIPTION - FREQUENCY: FREQUENCY: CONTINUOUS

## 2021-04-11 ASSESSMENT — PAIN DESCRIPTION - LOCATION: LOCATION: GROIN

## 2021-04-11 ASSESSMENT — PAIN DESCRIPTION - DESCRIPTORS: DESCRIPTORS: PRESSURE

## 2021-04-11 NOTE — ED PROVIDER NOTES
[facility]        Pt Name: Alexy Diaz  MRN: [id number]  [place] 1950  Date of evaluation: 4/11/2021  Provider: Lior Toro MD  PCP: Winter Reina MD    This patient was seen and evaluated by the attending physician Lior Toro MD.      [address] [place] [address]       Chief Complaint   Patient presents with    Groin Pain     [facility]    Urinary Retention     pt had prostate surgery 03/15/2021; unable to urinate since last afternoon. HISTORY OF PRESENT ILLNESS   (Location/Symptom, Timing/Onset, Context/Setting, Quality, Duration, Modifying Factors, Severity)  Note limiting factors. Alexy Diaz is a 80 y.o. male here with a chief complaint of urinary retention. Last time he is able to urinate was sometime yesterday afternoon. Notes some barak blood when he tries to urinate. No fevers chills sweats headache chest pain dyspnea    Seen by Dr. Yue Gupta on 3/15 for resection of 1 cm papillary appearing bladder tumor at left trigone/bladder neck; ddx low grade bladder cancer vs. Chronic irritation adenoma. Had a TURP to open channel focusing on large left lateral regrowth adenoma. Nursing Notes were all reviewed and agreed with or any disagreements were addressed  in the HPI. REVIEW OF SYSTEMS    (2-9 systems for level 4, 10 or more for level 5)     Review of Systems    Positives and Pertinent negatives as per HPI. Except as noted abovein the ROS, all other systems were reviewed and negative.        PAST MEDICAL HISTORY     Past Medical History:   Diagnosis Date    [facility] ([facility] disease) [facility]     Coronary artery disease involving native coronary artery of native heart with angina pectoris ([facility] 1/18/2017    Depression     Diabetes mellitus (HCC)     type II    Dysphagia     Enlarged prostate     Heart enlarged     Hyperlipidemia     Hypertension     MRSA (methicillin resistant staph aureus) culture positive 08/17/2018    sputum    Obesity     Pneumonia     multiple times 2018    Sleep apnea     has cpap but hasn't used \"in a long time\"         SURGICAL HISTORY     Past Surgical History:   Procedure Laterality Date    BRONCHOSCOPY  04/25/2018    CHOLECYSTECTOMY      CORONARY ANGIOPLASTY  01/11/2017    CYSTOSCOPY W BIOPSY OF BLADDER N/A 3/15/2021    RIGID CYSTOSCOPY WITH RESECTION OF BLADDER TUMOR performed by Vandana Lennon MD at [address]      teeth removed    GALLBLADDER SURGERY      gall stones    PROSTATE SURGERY      TURP N/A 3/15/2021    WITH TRANSURETHRAL RESECTION OF PROSTATE performed by Vandana Lennon MD at [facility]       Previous Medications    ALBUTEROL (PROVENTIL) (2.5 MG/3ML) 0.083% NEBULIZER SOLUTION    Take 3 mLs by nebulization every 6 hours as needed for Wheezing DX:COPD J44.9    ALBUTEROL SULFATE HFA (PROVENTIL HFA) 108 (90 BASE) MCG/ACT INHALER    Inhale 2 puffs into the lungs every 4 hours as needed for Wheezing    CLONAZEPAM (KLONOPIN) 1 MG TABLET    Take 1 mg by mouth 2 times daily as needed for Anxiety    DOXAZOSIN (CARDURA) 4 MG TABLET    Take 1 tablet by mouth nightly    FERROUS SULFATE (IRON 325) 325 (65 FE) MG TABLET    TAKE 1 TABLET BY MOUTH DAILY WITH BREAKFAST    FLUOXETINE (PROZAC) 20 MG CAPSULE    TK 1 C PO D    FUROSEMIDE (LASIX) 40 MG TABLET    TAKE 1 TABLET BY MOUTH DAILY    LOSARTAN (COZAAR) 100 MG TABLET    Take 1 tablet by mouth daily    METFORMIN (GLUCOPHAGE) 1000 MG TABLET    Take 1,000 mg by mouth 2 times daily (with meals)     METOPROLOL SUCCINATE (TOPROL XL) 50 MG EXTENDED RELEASE TABLET    TAKE 1 TABLET BY MOUTH DAILY    MOMETASONE (ASMANEX, 120 METERED DOSES,) 220 MCG/INH INHALER    Inhale 2 puffs into the lungs 2 times daily    NITROGLYCERIN (NITROSTAT) 0.4 MG SL TABLET    Place 1 tablet under the tongue every 5 minutes as needed for Chest pain    ROFLUMILAST (DALIRESP) 500 MCG TABLET    Take 1 tablet by mouth daily    SIMVASTATIN (ZOCOR) 40 MG TABLET    Take 0.5 tablets by mouth nightly    SPIRONOLACTONE (ALDACTONE) 25 MG TABLET    Take 1 tablet by mouth daily    UMECLIDINIUM-VILANTEROL (ANORO ELLIPTA) 62.5-25 MCG/INH AEPB INHALER    Inhale 1 puff into the lungs daily INHALE 1 PUFF INTO THE LUNGS DAILY         ALLERGIES     Lisinopril    FAMILYHISTORY       Family History   Problem Relation Age of Onset    COPD Mother     Heart Disease Father     COPD Father           SOCIAL HISTORY       Social History     Socioeconomic History    Marital status: Single     Spouse name: None    Number of children: None    Years of education: None    Highest education level: None   Occupational History    None   Social Needs    Financial resource strain: None    Food insecurity     Worry: None     Inability: None    Transportation needs     Medical: None     Non-medical: None   Tobacco Use    Smoking status: Former Smoker     Packs/day: 0.25     Years: 25.00     Pack years: 6.25     Types: Cigarettes, Pipe, Cigars     Quit date: 1/1/2011     Years since quitting: 10.2    Smokeless tobacco: Former User   Substance and Sexual Activity    Alcohol use: Not Currently    Drug use: No    Sexual activity: None   Lifestyle    Physical activity     Days per week: None     Minutes per session: None    Stress: None   Relationships    Social connections     Talks on phone: None     Gets together: None     Attends [employer] service: None     Active member of club or organization: None     Attends meetings of clubs or organizations: None     Relationship status: None    Intimate partner violence     Fear of current or ex partner: None     Emotionally abused: None     Physically abused: None     Forced sexual activity: None   Other Topics Concern    None   Social History Narrative    None       SCREENINGS             PHYSICAL EXAM    (up to 7 for level 4, 8 or more for level 5)     ED Triage Vitals BP Temp Temp src Pulse Resp SpO2 Height Weight   -- -- -- -- -- -- -- --       Physical Exam    General Appearance:  Alert, cooperative, no distress, appears stated age. Head:  Normocephalic, without obvious abnormality, atraumatic. Eyes:  conjunctiva/corneas clear, EOM's intact. Sclera anicteric. ENT: Mucous membranes moist.   Neck: Supple, symmetrical, trachea midline, no adenopathy. No jugular venous distention. Lungs:   No Respiratory Distress. Chest Wall:   Atraumatic    Heart:  regular rate rhythm   Abdomen:    With the obese. Palpable bladder up to level umbilicus. Extremities:  Full range of motion. Pulses: Symmetric x4   Skin:  No rashes or lesions to exposed skin. Neurologic: Alert and oriented X 3. Motor grossly normal.  Speech clear.           DIAGNOSTIC RESULTS   LABS:    Labs Reviewed   CBC WITH AUTO DIFFERENTIAL - Abnormal; Notable for the following components:       Result Value    WBC 14.0 (*)     Hemoglobin 12.4 (*)     Hematocrit 38.9 (*)     MCV 75.3 (*)     MCH 24.1 (*)     RDW 17.1 (*)     Neutrophils Absolute 12.9 (*)     Lymphocytes Absolute 0.7 (*)     Anisocytosis 1+ (*)     Microcytes 1+ (*)     Hypochromia Occasional (*)     All other components within normal limits    Narrative:     Performed at:  [facility] [address],  [place]   Phone [phone]   COMPREHENSIVE METABOLIC PANEL W/ REFLEX TO MG FOR LOW K - Abnormal; Notable for the following components:    Glucose 129 (*)     BUN 21 (*)     CREATININE 1.4 (*)     GFR Non- 50 (*)     AST 13 (*)     All other components within normal limits    Narrative:     Performed at:  [facility] [address],  [place]   Phone [phone]   URINALYSIS - Abnormal; Notable for the following components:    Color, UA RED (*)     Clarity, UA TURBID (*)     Blood, Urine LARGE (*)     Protein, UA >=300 (*) cefTRIAXone (ROCEPHIN) 1000 mg in sterile water 10 mL IV syringe (has no administration in time range)       10year-old male with hematuria who is in for urinary retention. Placing Mcgowan catheter. Checking some labs and urinalysis. He is not anticoagulated. FINAL IMPRESSION      1. Urinary retention          DISPOSITION/PLAN   DISPOSITION        PATIENT REFERREDTO:  No follow-up provider specified.     DISCHARGE MEDICATIONS:  New Prescriptions    No medications on file       DISCONTINUED MEDICATIONS:  Discontinued Medications    No medications on file              (Please note that portions ofthis note were completed with a voice recognition program.  Efforts were made to edit the dictations but occasionally words are mis-transcribed.)    Marylin Blanco MD (electronically signed)           Marylin Blanco MD  04/11/21 1593

## 2021-04-11 NOTE — PROGRESS NOTES
Mcgowan placed right away barak blood drained. Approximately 1800 ml drained immediately. MD [facility] notified.

## 2021-04-12 PROBLEM — R04.2 HEMOPTYSIS: Status: RESOLVED | Noted: 2018-04-10 | Resolved: 2021-04-12

## 2021-04-12 LAB
ANION GAP SERPL CALCULATED.3IONS-SCNC: 8 MMOL/L (ref 3–16)
BUN BLDV-MCNC: 22 MG/DL (ref 7–20)
CALCIUM SERPL-MCNC: 8.3 MG/DL (ref 8.3–10.6)
CHLORIDE BLD-SCNC: 107 MMOL/L (ref 99–110)
CO2: 24 MMOL/L (ref 21–32)
CREAT SERPL-MCNC: 1 MG/DL (ref 0.8–1.3)
GFR AFRICAN AMERICAN: >60
GFR NON-AFRICAN AMERICAN: >60
GLUCOSE BLD-MCNC: 119 MG/DL (ref 70–99)
POTASSIUM SERPL-SCNC: 3.6 MMOL/L (ref 3.5–5.1)
PROSTATE SPECIFIC ANTIGEN: 4.33 NG/ML (ref 0–4)
SODIUM BLD-SCNC: 139 MMOL/L (ref 136–145)
URINE CULTURE, ROUTINE: NORMAL

## 2021-04-12 PROCEDURE — 51798 US URINE CAPACITY MEASURE: CPT

## 2021-04-12 PROCEDURE — 51702 INSERT TEMP BLADDER CATH: CPT

## 2021-04-12 PROCEDURE — 80048 BASIC METABOLIC PNL TOTAL CA: CPT

## 2021-04-12 PROCEDURE — 6360000002 HC RX W HCPCS: Performed by: INTERNAL MEDICINE

## 2021-04-12 PROCEDURE — 94640 AIRWAY INHALATION TREATMENT: CPT

## 2021-04-12 PROCEDURE — 6370000000 HC RX 637 (ALT 250 FOR IP): Performed by: INTERNAL MEDICINE

## 2021-04-12 PROCEDURE — 94760 N-INVAS EAR/PLS OXIMETRY 1: CPT

## 2021-04-12 PROCEDURE — 6370000000 HC RX 637 (ALT 250 FOR IP): Performed by: DENTIST

## 2021-04-12 PROCEDURE — 1200000000 HC SEMI PRIVATE

## 2021-04-12 PROCEDURE — 94761 N-INVAS EAR/PLS OXIMETRY MLT: CPT

## 2021-04-12 PROCEDURE — 2580000003 HC RX 258: Performed by: INTERNAL MEDICINE

## 2021-04-12 RX ORDER — SPIRONOLACTONE 25 MG/1
25 TABLET ORAL DAILY
Qty: 90 TABLET | Refills: 3 | Status: SHIPPED | OUTPATIENT
Start: 2021-04-12 | End: 2022-06-14 | Stop reason: SDUPTHER

## 2021-04-12 RX ORDER — ACETAMINOPHEN 325 MG/1
650 TABLET ORAL EVERY 4 HOURS PRN
Status: DISCONTINUED | OUTPATIENT
Start: 2021-04-12 | End: 2021-04-14 | Stop reason: HOSPADM

## 2021-04-12 RX ORDER — ROFLUMILAST 500 UG/1
TABLET ORAL
Qty: 30 TABLET | Refills: 5 | Status: SHIPPED | OUTPATIENT
Start: 2021-04-12 | End: 2021-06-16 | Stop reason: SDUPTHER

## 2021-04-12 RX ORDER — LIDOCAINE HYDROCHLORIDE 20 MG/ML
JELLY TOPICAL ONCE
Status: COMPLETED | OUTPATIENT
Start: 2021-04-12 | End: 2021-04-12

## 2021-04-12 RX ADMIN — METFORMIN HYDROCHLORIDE 1000 MG: 500 TABLET ORAL at 08:24

## 2021-04-12 RX ADMIN — METFORMIN HYDROCHLORIDE 1000 MG: 500 TABLET ORAL at 17:57

## 2021-04-12 RX ADMIN — LOSARTAN POTASSIUM 100 MG: 100 TABLET, FILM COATED ORAL at 08:23

## 2021-04-12 RX ADMIN — ACETAMINOPHEN 650 MG: 325 TABLET ORAL at 15:47

## 2021-04-12 RX ADMIN — Medication 1000 MG: at 20:20

## 2021-04-12 RX ADMIN — METOPROLOL SUCCINATE 50 MG: 50 TABLET, EXTENDED RELEASE ORAL at 08:23

## 2021-04-12 RX ADMIN — FLUOXETINE 20 MG: 20 CAPSULE ORAL at 08:23

## 2021-04-12 RX ADMIN — SODIUM CHLORIDE: 9 INJECTION, SOLUTION INTRAVENOUS at 08:25

## 2021-04-12 RX ADMIN — ROFLUMILAST 500 MCG: 500 TABLET ORAL at 12:12

## 2021-04-12 RX ADMIN — SPIRONOLACTONE 25 MG: 25 TABLET ORAL at 08:23

## 2021-04-12 RX ADMIN — GLYCOPYRROLATE AND FORMOTEROL FUMARATE 2 PUFF: 9; 4.8 AEROSOL, METERED RESPIRATORY (INHALATION) at 19:20

## 2021-04-12 RX ADMIN — ATORVASTATIN CALCIUM 20 MG: 20 TABLET, FILM COATED ORAL at 08:24

## 2021-04-12 RX ADMIN — CLONAZEPAM 0.5 MG: 1 TABLET ORAL at 22:33

## 2021-04-12 RX ADMIN — BUDESONIDE 500 MCG: 0.5 SUSPENSION RESPIRATORY (INHALATION) at 19:20

## 2021-04-12 RX ADMIN — GLYCOPYRROLATE AND FORMOTEROL FUMARATE 2 PUFF: 9; 4.8 AEROSOL, METERED RESPIRATORY (INHALATION) at 10:47

## 2021-04-12 RX ADMIN — CLONAZEPAM 0.5 MG: 1 TABLET ORAL at 01:35

## 2021-04-12 RX ADMIN — LIDOCAINE HYDROCHLORIDE: 20 JELLY TOPICAL at 12:16

## 2021-04-12 RX ADMIN — FERROUS SULFATE TAB 325 MG (65 MG ELEMENTAL FE) 325 MG: 325 (65 FE) TAB at 08:24

## 2021-04-12 RX ADMIN — BUDESONIDE 250 MCG: 0.5 SUSPENSION RESPIRATORY (INHALATION) at 10:44

## 2021-04-12 ASSESSMENT — PAIN DESCRIPTION - LOCATION
LOCATION: HEAD
LOCATION: HEAD

## 2021-04-12 ASSESSMENT — PAIN SCALES - GENERAL
PAINLEVEL_OUTOF10: 0
PAINLEVEL_OUTOF10: 0
PAINLEVEL_OUTOF10: 5
PAINLEVEL_OUTOF10: 0
PAINLEVEL_OUTOF10: 0
PAINLEVEL_OUTOF10: 6

## 2021-04-12 ASSESSMENT — PAIN DESCRIPTION - DESCRIPTORS: DESCRIPTORS: ACHING;DISCOMFORT

## 2021-04-12 ASSESSMENT — PAIN DESCRIPTION - ONSET: ONSET: SUDDEN

## 2021-04-12 ASSESSMENT — PAIN DESCRIPTION - DIRECTION: RADIATING_TOWARDS: BACK OF THE HEAD

## 2021-04-12 ASSESSMENT — PAIN DESCRIPTION - PAIN TYPE
TYPE: ACUTE PAIN
TYPE: ACUTE PAIN

## 2021-04-12 ASSESSMENT — PAIN DESCRIPTION - ORIENTATION: ORIENTATION: RIGHT;LEFT;MID

## 2021-04-12 ASSESSMENT — PAIN DESCRIPTION - FREQUENCY: FREQUENCY: CONTINUOUS

## 2021-04-12 ASSESSMENT — PAIN - FUNCTIONAL ASSESSMENT: PAIN_FUNCTIONAL_ASSESSMENT: ACTIVITIES ARE NOT PREVENTED

## 2021-04-12 NOTE — PROGRESS NOTES
Assessment completed. VSS. Medications given per MAR. Jewell intact, putting out blood and clots. Will continue to monitor. Urology to switch out jewell catheter, supplies put to the bedside.

## 2021-04-12 NOTE — PLAN OF CARE
Problem: Sensory:  Goal: General experience of comfort will improve  Description: General experience of comfort will improve  Outcome: Ongoing     Problem: Urinary Elimination:  Goal: Signs and symptoms of infection will decrease  Description: Signs and symptoms of infection will decrease  Outcome: Ongoing  Goal: Ability to reestablish a normal urinary elimination pattern will improve - after catheter removal  Description: Ability to reestablish a normal urinary elimination pattern will improve  Outcome: Ongoing  Goal: Complications related to the disease process, condition or treatment will be avoided or minimized  Description: Complications related to the disease process, condition or treatment will be avoided or minimized  Outcome: Ongoing

## 2021-04-12 NOTE — PROGRESS NOTES
note    Removed 18F and replaced with 22F Mcgowan. Irrigation with 1,800cc's sterile saline. Removed numerous large clots. Mcgowan is draining well. Will hold AC and keep NPO at midnight, will see patient tomorrow am and revaluate hematuria. PRN irrigation per staff.

## 2021-04-12 NOTE — CARE COORDINATION
Discharge Planning Assessment  Rn/SW discharge planner met w/patient/family member to discuss reason for admission, current living situation, and potential needs at the time of discharge. Demographics/Insurance verified Yes    Current type of dwellin level home    Living arrangements: w/ex-wife    Level of function/support: independent w/all ADL's    PCP: Bhanu    Last Visit to PCP: stated about 2-3 mos ago    DME: stated none    Active with any community resources/agencies/skilled home care: stated no services in the home    Medication compliance issues: stated no issues    Financial issues that could impact healthcare: stated no issues    Transportation at time of d/c (Discussed w/pt/family that on the day of discharge home, tentative time of discharge will be between 10am and noon): may need Lyft on dc    Discussed and provided facilities of choice if transition to a skilled nursing facility is required a the time of discharge-NA. Tentative discharge plan: Met w/pt to discuss any potential dc needs. Pt stated he is from home, independently (assists spouse as needed). Pt stated he may need transport home-will notify should need arises. Pt anticipates no needs on dc.     Electronically signed by DEBBIE Murguia  [phone]

## 2021-04-12 NOTE — CONSULTS
[facility] Consult Note  [facility]    Provider: Zelda Schumacher MD Patient ID:  Admission Date: 2021 Name: Areli Prakash  OR Date: n/a MRN: [id number]   Patient Location: [id number] : 1950  Attending: Romayne Dexter, MD Date of Service: 2021  PCP: Godwin Harrell MD     Diagnoses:  1. Urinary retention      [facility]  Clot retention    Assessment/Plan:  81 yo M with TURP , recurrent GH and repeat TURP 3/2021. [facility] post op cleared, now with 3 days of [facility] and feeling of incomplete emptying. 18 Fr jewell placed in ER with 1800 cc bloody urine. Seen this AM and 0 old clot irrigated at the bedside. Feeling much better after irrigation    - will upsize jewell later this AM and additional manual irrigation  - nursing to irrigate PRN if increase in pain or not draining  - if persistent bleeding may need imaging vs clot evac. - NPO at [place] tonight  - hold anticoagulation       All the patients questions were answered in detail. He understands the plan as listed above. · Review/order of labs  · Review/order of radiology studies  · transferring data from old records into today's office visit record    Total time spent face-to-face with the patient 25 min, including greater than 50% of this time in discussion with the patient/family concerning the following:  · Recommended tests  · management options  · risks/benefits of management options  · importance of compliance  · Prognosis  · Risk factor reduction  · Patient/family education                                                                                                                                                      CC:   Chief Complaint   Patient presents with    Groin Pain     [facility]    Urinary Retention     pt had prostate surgery 03/15/2021; unable to urinate since last afternoon. HPI: Areli Prakash is a 80 y.o. male.  I saw the patient today for gross hematuria, and associated symptoms of bladder pain, that have been present for several days. Symptoms relieved by jewell placement and aggravated by continued clots. He has tried the following treatments: ER visit with jewell placement followed by irrigation. Past medical History:   He has a past medical history of COPD (chronic obstructive pulmonary disease) ([facility] [address] Coronary artery disease involving native coronary artery of native heart with angina pectoris ([facility] [address] (1/18/2017), Depression, [facility] Dysphagia, Enlarged prostate, Heart enlarged, Hyperlipidemia, Hypertension, MRSA (methicillin resistant staph aureus) culture positive (08/17/2018), Obesity, Pneumonia, and Sleep apnea. Past Surgical History:  He has a past surgical history that includes Prostate surgery; Gallbladder surgery; Dental surgery; Coronary angioplasty (01/11/2017); Cholecystectomy; bronchoscopy (04/25/2018); cystoscopy w biopsy of bladder (N/A, 3/15/2021); and TURP (N/A, 3/15/2021). Allergies: Allergies   Allergen Reactions    Lisinopril Other (See Comments)     COUGH       Social History:  He reports that he quit smoking about 10 years ago. His smoking use included cigarettes, pipe, and cigars. He has a 6.25 pack-year smoking history. He has quit using smokeless tobacco. He reports previous alcohol use. He reports that he does not use drugs. Family History:  family history includes COPD in his father and mother; Heart Disease in his father.     Medications:   Scheduled Meds:   doxazosin  4 mg Oral Nightly    ferrous sulfate  325 mg Oral Daily with breakfast    FLUoxetine  20 mg Oral Daily    losartan  100 mg Oral Daily    metFORMIN  1,000 mg Oral BID WC    metoprolol succinate  50 mg Oral Daily    budesonide  0.25 mg Nebulization BID    Roflumilast  500 mcg Oral Daily    atorvastatin  20 mg Oral Daily    spironolactone  25 mg Oral Daily    glycopyrrolate-formoterol  2 puff Inhalation BID    cefTRIAXone (ROCEPHIN) IV  1,000 mg Intravenous Q24H    enoxaparin  40 mg Subcutaneous Daily     Continuous Infusions:   sodium chloride 100 mL/hr at 04/12/21 0825     PRN Meds:albuterol, ipratropium-albuterol, clonazePAM, nitroGLYCERIN    Review of Systems:  Constitutional: Negative for fever    Genitourinary: see HPI  Eyes: negative for sudden change in vision  EENT: no complaints  Cardiovascular: Negative for chest pain  Respiratory: Negative for shortness of breath  Gastrointestinal: Negative for nausea  Musculoskeletal: Negative for back pain   Neurological: Negative for weakness  Psychiatric: Negative for anxiety  Integumentary: Negative for rashes or adenopathy     Physical Exam:  Vitals:    04/12/21 0850   BP:    Pulse:    Resp:    Temp:    SpO2: 93%     Constitutional: NAD, well-developed, well-nourished. HEENT: MMM. Hearing intact. PERRL  Neck: no thyroid masses appreciated. Trachea is midline. Neck appears unremarkable   Lymph: no palpable adenopathy in supraclavicular, or axillary lymph nodes  Cardiovascular: Regular rate. No peripheral edema  Respiratory: Respirations are even and non-labored. No audible breath sounds. Genitourinary:  glans normal, no penile discharge. No rashes/lesions. Testes descended bilaterally, no masses, nontender to palpation. Remainder of scrotal contents normal. No hernia appreciated. Mcgowan with dark red blood with clots  Abdomen: Soft. No distension, tenderness, hernias, masses or guarding. No CVA tenderness. No hernias appreciated. Liver and spleen appear normal  Psychiatric: A + O x 3, normal affect. Insight appears intact. Muskuloskeletal: JACOBY x 4   Skin: Pink, warm and dry. No rashes on face and arms.     Labs:  Lab Results   Component Value Date    WBC 11.7 (H) 04/11/2021    HGB 11.3 (L) 04/11/2021    HCT 34.7 (L) 04/11/2021    MCV 75.3 (L) 04/11/2021     04/11/2021     Lab Results   Component Value Date    CREATININE 1.0 04/12/2021    BUN 22 (H) 04/12/2021     04/12/2021    K 3.6 04/12/2021     04/12/2021    CO2 24 04/12/2021     Lab Results   Component Value Date    PSA 4.33 (H) 04/11/2021    PSA 1.82 09/28/2011        Imaging:   none    Luisa Roland MD  4/12/2021

## 2021-04-12 NOTE — PLAN OF CARE
Problem: Sensory:  Goal: General experience of comfort will improve  Description: General experience of comfort will improve  4/12/2021 1005 by Dianne Kramer RN  Outcome: Ongoing  4/11/2021 2222 by Jax Lerner RN  Outcome: Ongoing     Problem: Urinary Elimination:  Goal: Signs and symptoms of infection will decrease  Description: Signs and symptoms of infection will decrease  4/12/2021 1005 by Dianne Kramer RN  Outcome: Ongoing  4/11/2021 2222 by Jax Lerner RN  Outcome: Ongoing     Problem: Urinary Elimination:  Goal: Ability to reestablish a normal urinary elimination pattern will improve - after catheter removal  Description: Ability to reestablish a normal urinary elimination pattern will improve  4/12/2021 1005 by Dianne Kramer RN  Outcome: Ongoing  4/11/2021 2222 by Jax Lerner RN  Outcome: Ongoing     Problem: Urinary Elimination:  Goal: Complications related to the disease process, condition or treatment will be avoided or minimized  Description: Complications related to the disease process, condition or treatment will be avoided or minimized  4/12/2021 1005 by Dianne Kramer RN  Outcome: Ongoing  4/11/2021 2222 by Jax Lerner RN  Outcome: Ongoing

## 2021-04-12 NOTE — PROGRESS NOTES
Assessment complete. Alert, oriented x4. Patient talked to son on phone; OK with son Kaye Wheeler.) and daughter Di Goodwin receiving updates. Assisted to bathroom, unable to have BM. Blood leaking from jewell insertion site; jewell care. Jewell in place, emptied of bloody urine with clots. Dry, flaky skin noted to scrotum. Blanchable erythema noted to buttocks; educated on turning and repositioning, verbalized understanding. Patient rating low fall risk, alarm in place as a reminder to call for assistance with IV pole and jewell bag prior to ambulation. Oriented to room and use of call light; verbalized understanding. The care plan and education has been reviewed and mutually agreed upon with the patient. In bed, bed alarm on, bed in lowest position, call light and bedside table within reach. No further needs expressed at this time. 0140  Patient having difficulty sleeping, requesting Klonopin which he takes nightly at home; given per [facility]    0550  Not much output in jewell bag since last emptied. Bladder scanned for 320 mL. Patient denies feelings of discomfort. Jewell irrigated with normal saline; urine appears to be flowing slowly, but freely by gravity into bag.    0650  100 mL bloody urine emptied from jewell bag following irrigation.

## 2021-04-13 PROCEDURE — 94761 N-INVAS EAR/PLS OXIMETRY MLT: CPT

## 2021-04-13 PROCEDURE — 2580000003 HC RX 258: Performed by: INTERNAL MEDICINE

## 2021-04-13 PROCEDURE — 94640 AIRWAY INHALATION TREATMENT: CPT

## 2021-04-13 PROCEDURE — 6360000002 HC RX W HCPCS: Performed by: INTERNAL MEDICINE

## 2021-04-13 PROCEDURE — 1200000000 HC SEMI PRIVATE

## 2021-04-13 PROCEDURE — 6370000000 HC RX 637 (ALT 250 FOR IP): Performed by: INTERNAL MEDICINE

## 2021-04-13 RX ORDER — ACETAMINOPHEN, ASPIRIN AND CAFFEINE 250; 250; 65 MG/1; MG/1; MG/1
1 TABLET, FILM COATED ORAL EVERY 6 HOURS PRN
Status: DISCONTINUED | OUTPATIENT
Start: 2021-04-13 | End: 2021-04-14 | Stop reason: HOSPADM

## 2021-04-13 RX ORDER — METOPROLOL SUCCINATE 50 MG/1
100 TABLET, EXTENDED RELEASE ORAL DAILY
Status: DISCONTINUED | OUTPATIENT
Start: 2021-04-14 | End: 2021-04-14 | Stop reason: HOSPADM

## 2021-04-13 RX ADMIN — ATORVASTATIN CALCIUM 20 MG: 20 TABLET, FILM COATED ORAL at 11:56

## 2021-04-13 RX ADMIN — SODIUM CHLORIDE: 9 INJECTION, SOLUTION INTRAVENOUS at 14:00

## 2021-04-13 RX ADMIN — BUDESONIDE 250 MCG: 0.5 SUSPENSION RESPIRATORY (INHALATION) at 08:03

## 2021-04-13 RX ADMIN — Medication 1000 MG: at 17:40

## 2021-04-13 RX ADMIN — DOXAZOSIN 4 MG: 4 TABLET ORAL at 20:54

## 2021-04-13 RX ADMIN — METFORMIN HYDROCHLORIDE 1000 MG: 500 TABLET ORAL at 11:56

## 2021-04-13 RX ADMIN — FLUOXETINE 20 MG: 20 CAPSULE ORAL at 11:56

## 2021-04-13 RX ADMIN — ROFLUMILAST 500 MCG: 500 TABLET ORAL at 11:56

## 2021-04-13 RX ADMIN — FERROUS SULFATE TAB 325 MG (65 MG ELEMENTAL FE) 325 MG: 325 (65 FE) TAB at 11:56

## 2021-04-13 RX ADMIN — METFORMIN HYDROCHLORIDE 1000 MG: 500 TABLET ORAL at 17:09

## 2021-04-13 RX ADMIN — CLONAZEPAM 0.5 MG: 1 TABLET ORAL at 22:40

## 2021-04-13 RX ADMIN — ACETAMINOPHEN 650 MG: 325 TABLET ORAL at 20:54

## 2021-04-13 RX ADMIN — METOPROLOL SUCCINATE 50 MG: 50 TABLET, EXTENDED RELEASE ORAL at 09:07

## 2021-04-13 RX ADMIN — LOSARTAN POTASSIUM 100 MG: 100 TABLET, FILM COATED ORAL at 11:56

## 2021-04-13 RX ADMIN — GLYCOPYRROLATE AND FORMOTEROL FUMARATE 2 PUFF: 9; 4.8 AEROSOL, METERED RESPIRATORY (INHALATION) at 22:48

## 2021-04-13 RX ADMIN — SPIRONOLACTONE 25 MG: 25 TABLET ORAL at 11:56

## 2021-04-13 RX ADMIN — SODIUM CHLORIDE: 9 INJECTION, SOLUTION INTRAVENOUS at 04:11

## 2021-04-13 RX ADMIN — BUDESONIDE 250 MCG: 0.5 SUSPENSION RESPIRATORY (INHALATION) at 22:46

## 2021-04-13 RX ADMIN — GLYCOPYRROLATE AND FORMOTEROL FUMARATE 2 PUFF: 9; 4.8 AEROSOL, METERED RESPIRATORY (INHALATION) at 08:03

## 2021-04-13 ASSESSMENT — PAIN DESCRIPTION - ORIENTATION: ORIENTATION: UPPER;POSTERIOR

## 2021-04-13 ASSESSMENT — PAIN - FUNCTIONAL ASSESSMENT: PAIN_FUNCTIONAL_ASSESSMENT: ACTIVITIES ARE NOT PREVENTED

## 2021-04-13 ASSESSMENT — PAIN DESCRIPTION - PROGRESSION: CLINICAL_PROGRESSION: GRADUALLY WORSENING

## 2021-04-13 ASSESSMENT — PAIN DESCRIPTION - FREQUENCY: FREQUENCY: CONTINUOUS

## 2021-04-13 ASSESSMENT — PAIN DESCRIPTION - ONSET: ONSET: ON-GOING

## 2021-04-13 ASSESSMENT — PAIN SCALES - GENERAL
PAINLEVEL_OUTOF10: 0
PAINLEVEL_OUTOF10: 5

## 2021-04-13 ASSESSMENT — PAIN DESCRIPTION - PAIN TYPE: TYPE: ACUTE PAIN

## 2021-04-13 ASSESSMENT — PAIN DESCRIPTION - DESCRIPTORS: DESCRIPTORS: HEADACHE

## 2021-04-13 ASSESSMENT — PAIN DESCRIPTION - LOCATION: LOCATION: HEAD

## 2021-04-13 NOTE — PROGRESS NOTES
Patient Active Problem List   Diagnosis    Mixed hyperlipidemia    Left ventricular hypertrophy    Obesity    Essential hypertension, malignant    KARYNA (obstructive sleep apnea)    Restrictive lung disease    Coronary artery disease involving native coronary artery of native heart with angina pectoris (HCC)    Cardiomyopathy ([facility] [address]    COPD, severe ([facility] [address] [facility]    UTI (urinary tract infection)   H&P dictated

## 2021-04-13 NOTE — PLAN OF CARE
Problem: Sensory:  Goal: General experience of comfort will improve  Description: General experience of comfort will improve  4/13/2021 0917 by Tushar Hines RN  Outcome: Ongoing  4/13/2021 0308 by Jose Glover RN  Outcome: Ongoing     Problem: Urinary Elimination:  Goal: Signs and symptoms of infection will decrease  Description: Signs and symptoms of infection will decrease  4/13/2021 0917 by Tushar Hines RN  Outcome: Ongoing  4/13/2021 0308 by Jose Glover RN  Outcome: Ongoing  Goal: Ability to reestablish a normal urinary elimination pattern will improve - after catheter removal  Description: Ability to reestablish a normal urinary elimination pattern will improve  4/13/2021 0917 by Tushar Hines RN  Outcome: Ongoing  4/13/2021 0308 by Jose Glover RN  Outcome: Ongoing  Goal: Complications related to the disease process, condition or treatment will be avoided or minimized  Description: Complications related to the disease process, condition or treatment will be avoided or minimized  4/13/2021 0917 by Tushar Hines RN  Outcome: Ongoing  4/13/2021 0308 by Jose Glover RN  Outcome: Ongoing

## 2021-04-13 NOTE — PROGRESS NOTES
6682: Shift assessment complete. VSS. Alert and oriented x4. See flowsheets. Morning medications held because patient is NPO for potential procedure today, metoprolol given. The care plan and education has been reviewed and mutually agreed upon with the patient. Pt needs expressed, call light within reach, will continue to monitor. 1045: General diet ordered per urology's note.

## 2021-04-13 NOTE — H&P
a . 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 There is no history of substance abuse. REVIEW OF SYSTEMS:  Negative for loss of consciousness. Does have  generalized weakness and malaise. No fever, no chills. No visual  blurring. No seizure. No dysphagia. No angina pectoris. Does have  exertional shortness of breath. Does have lower abdominal discomfort. Does have dysuria and urinary retention. Does have gross hematuria. There is no fever, no chills. No arthralgia. There is no seizure  activity. PHYSICAL EXAMINATION:  GENERAL:   Alert, awake, oriented x3, moderately distressed 78-year-old  white man looking consistent with his stated age. His temperature is  98.1, blood pressure 143/89, respirations 16, heart rate 63. HEENT:  Oral mucosa dry. SKIN:  Warm and pale. NECK:  Supple. No jugular venous distention. No lymphadenopathy. No  thyromegaly. LUNGS:  Vesicular breath sounds. Fairly clear to auscultation. HEART:  Regular rate and rhythm. S1, S2 without any S3 or S4 gallop. ABDOMEN:  Soft. There is slight tenderness in the lower quadrant. No  guarding, rebound or rigidity. EXTREMITIES:  Shows no cyanosis or edema. Distal pulsations are weak. NEUROLOGIC:  The patient is grossly intact. LABORATORY DATA:  Lab evaluation shows sodium 138, potassium 4.1,  chloride 103, CO2 24, BUN 21, creatinine 1.4, anion gap is 11, GFR is  50, calcium is 9.1, albumin 4.1, globulin 2.9, alkaline phosphatase 123. AST and ALT are 13 and 12, total protein 7. Blood sugar 129. White  blood cell count 14,000, hemoglobin/hematocrit 12.4 and 38.9, platelet  count 260. PT/INR is 12.9 and 1.11, PTT is 29.3. PSA is 4.33. Urinalysis, there is large amount of blood, negative nitrite, large  amount of leukocyte esterase. ASSESSMENT:  Hematuria, urinary retention, lower abdominal pain, TURP  per history, hypertension. PLAN:  Get him admitted.   Treat him with IV hydration, Mcgowan  catheterization, IV ceftriaxone, urine culture and sensitivity.     As always, it is a pleasure and privilege to take care of your patients  at [address] [place], [place] [address]isadora Yost MD    D: 04/12/2021 23:29:49       T: 04/12/2021 23:33:06     [email or web address]  Job#: [id number]     Doc#: [id number]    CC:  Vinod Knott MD

## 2021-04-13 NOTE — PROGRESS NOTES
Assessment complete. Alert, oriented x4. Denies pain. Jewell in place, draining bloody urine without clots freely by gravity. Patient rates as low fall risk, but bed alarm on as a reminder to call for assistance with IV pole and jewell while ambulating. Patient aware that he is to be NPO after midnight. The care plan and education has been reviewed and mutually agreed upon with the patient. In bed, bed alarm on, bed in lowest position, call light and bedside table within reach. No further needs expressed at this time. 0030  Patient NPO at this time. Jewell still draining well. SCDs educated, in place.

## 2021-04-13 NOTE — PROGRESS NOTES
Urology Progress Note  [facility]    Provider: Rosendo Irving APRYAYA-CNP Patient ID:  Admission Date: 2021 Name: Nito Salas  OR Date: 2021 MRN: [id number]   Patient Location: [id number] : 1950  Attending: Evelyn Quiroz MD Date of Service: 2021  PCP: Le Bethea MD     Diagnoses:  [facility]  Clot Retention    Assessment/Plan:  79 yo M with TURP , recurrent [facility] and repeat TURP 3/2021. [facility] post op cleared, now with 3 days of [facility] and feeling of incomplete emptying. 18 Fr jewell placed in ER with 1800 cc bloody urine. Seen this AM and 0 old clot irrigated at the bedside. Feeling much better after irrigation     - Now with 22F draining with good output. - Dark cherry red urine. Irrigation at bedside this morning with 2L sterile saline. Large clots irrigated. Draining well. - OK to eat today, Keep NPO at midnight  - Continue to hold [facility]   - Urology will continue to follow, call with any questions. The patient had a chance to ask questions which were answered. he understands the above plan. Subjective:   Nito Salas is a 80 y.o. male. He was seen and examined this morning. Today we discussed holding blood thinners another night. Irrigation at bedside this morning. Discussed discharge when urine clear. Objective:   Vitals:  Vitals:    21 0803   BP:    Pulse:    Resp:    Temp:    SpO2: 98%       Intake/Output Summary (Last 24 hours) at 2021 9963  Last data filed at 2021 0411  Gross per 24 hour   Intake 3283 ml   Output 3175 ml   Net 108 ml     Physical Exam:  Gen: Alert and oriented x3, no acute distress  CV: Regular rate   Resp: unlabored respirations  Abd: Soft, non-distended, non-tender, no masses  : Jewell in place, skin intact, circumcised, dark red urine  Ext: no peripheral edema noted, moves upper and lower extremities spontaneously  Skin: warmand well perfused, no rashes noted on the face, or arms.      Labs:  Lab Results   Component Value Date    WBC 11.7 (H) 04/11/2021    HGB 11.3 (L) 04/11/2021    HCT 34.7 (L) 04/11/2021    MCV 75.3 (L) 04/11/2021     04/11/2021     Lab Results   Component Value Date    CREATININE 1.0 04/12/2021    BUN 22 (H) 04/12/2021     04/12/2021    K 3.6 04/12/2021     04/12/2021    CO2 24 04/12/2021       Gutierrez Miles APRN-CNP  4/13/2021

## 2021-04-14 ENCOUNTER — ANESTHESIA (OUTPATIENT)
Dept: OPERATING ROOM | Age: 71
DRG: 663 | End: 2021-04-14
Payer: MEDICARE

## 2021-04-14 ENCOUNTER — ANESTHESIA EVENT (OUTPATIENT)
Dept: OPERATING ROOM | Age: 71
DRG: 663 | End: 2021-04-14
Payer: MEDICARE

## 2021-04-14 VITALS
OXYGEN SATURATION: 100 % | SYSTOLIC BLOOD PRESSURE: 100 MMHG | DIASTOLIC BLOOD PRESSURE: 62 MMHG | RESPIRATION RATE: 15 BRPM

## 2021-04-14 VITALS
OXYGEN SATURATION: 97 % | WEIGHT: 265 LBS | TEMPERATURE: 98 F | DIASTOLIC BLOOD PRESSURE: 91 MMHG | HEIGHT: 71 IN | SYSTOLIC BLOOD PRESSURE: 157 MMHG | HEART RATE: 70 BPM | BODY MASS INDEX: 37.1 KG/M2 | RESPIRATION RATE: 16 BRPM

## 2021-04-14 LAB
ANION GAP SERPL CALCULATED.3IONS-SCNC: 9 MMOL/L (ref 3–16)
BUN BLDV-MCNC: 12 MG/DL (ref 7–20)
CALCIUM SERPL-MCNC: 8.1 MG/DL (ref 8.3–10.6)
CHLORIDE BLD-SCNC: 108 MMOL/L (ref 99–110)
CO2: 21 MMOL/L (ref 21–32)
CREAT SERPL-MCNC: 0.8 MG/DL (ref 0.8–1.3)
GFR AFRICAN AMERICAN: >60
GFR NON-AFRICAN AMERICAN: >60
GLUCOSE BLD-MCNC: 100 MG/DL (ref 70–99)
GLUCOSE BLD-MCNC: 103 MG/DL (ref 70–99)
GLUCOSE BLD-MCNC: 104 MG/DL (ref 70–99)
HCT VFR BLD CALC: 27.7 % (ref 40.5–52.5)
HEMOGLOBIN: 9 G/DL (ref 13.5–17.5)
MCH RBC QN AUTO: 24.6 PG (ref 26–34)
MCHC RBC AUTO-ENTMCNC: 32.4 G/DL (ref 31–36)
MCV RBC AUTO: 75.9 FL (ref 80–100)
PDW BLD-RTO: 16.8 % (ref 12.4–15.4)
PERFORMED ON: ABNORMAL
PERFORMED ON: ABNORMAL
PLATELET # BLD: 153 K/UL (ref 135–450)
PMV BLD AUTO: 7.9 FL (ref 5–10.5)
POTASSIUM SERPL-SCNC: 3.9 MMOL/L (ref 3.5–5.1)
RBC # BLD: 3.65 M/UL (ref 4.2–5.9)
SODIUM BLD-SCNC: 138 MMOL/L (ref 136–145)
WBC # BLD: 6.7 K/UL (ref 4–11)

## 2021-04-14 PROCEDURE — 2500000003 HC RX 250 WO HCPCS: Performed by: NURSE ANESTHETIST, CERTIFIED REGISTERED

## 2021-04-14 PROCEDURE — 2709999900 HC NON-CHARGEABLE SUPPLY: Performed by: UROLOGY

## 2021-04-14 PROCEDURE — 6360000002 HC RX W HCPCS: Performed by: NURSE ANESTHETIST, CERTIFIED REGISTERED

## 2021-04-14 PROCEDURE — 80048 BASIC METABOLIC PNL TOTAL CA: CPT

## 2021-04-14 PROCEDURE — 6360000002 HC RX W HCPCS: Performed by: ANESTHESIOLOGY

## 2021-04-14 PROCEDURE — 2720000010 HC SURG SUPPLY STERILE: Performed by: UROLOGY

## 2021-04-14 PROCEDURE — 3700000001 HC ADD 15 MINUTES (ANESTHESIA): Performed by: UROLOGY

## 2021-04-14 PROCEDURE — 3700000000 HC ANESTHESIA ATTENDED CARE: Performed by: UROLOGY

## 2021-04-14 PROCEDURE — 6370000000 HC RX 637 (ALT 250 FOR IP): Performed by: INTERNAL MEDICINE

## 2021-04-14 PROCEDURE — 94761 N-INVAS EAR/PLS OXIMETRY MLT: CPT

## 2021-04-14 PROCEDURE — 85027 COMPLETE CBC AUTOMATED: CPT

## 2021-04-14 PROCEDURE — 0TCB8ZZ EXTIRPATION OF MATTER FROM BLADDER, VIA NATURAL OR ARTIFICIAL OPENING ENDOSCOPIC: ICD-10-PCS | Performed by: UROLOGY

## 2021-04-14 PROCEDURE — 0W3R8ZZ CONTROL BLEEDING IN GENITOURINARY TRACT, VIA NATURAL OR ARTIFICIAL OPENING ENDOSCOPIC: ICD-10-PCS | Performed by: UROLOGY

## 2021-04-14 PROCEDURE — 3600000004 HC SURGERY LEVEL 4 BASE: Performed by: UROLOGY

## 2021-04-14 PROCEDURE — 6360000002 HC RX W HCPCS: Performed by: INTERNAL MEDICINE

## 2021-04-14 PROCEDURE — 94640 AIRWAY INHALATION TREATMENT: CPT

## 2021-04-14 PROCEDURE — 7100000000 HC PACU RECOVERY - FIRST 15 MIN: Performed by: UROLOGY

## 2021-04-14 PROCEDURE — 6370000000 HC RX 637 (ALT 250 FOR IP): Performed by: UROLOGY

## 2021-04-14 PROCEDURE — 2580000003 HC RX 258: Performed by: UROLOGY

## 2021-04-14 PROCEDURE — 3600000014 HC SURGERY LEVEL 4 ADDTL 15MIN: Performed by: UROLOGY

## 2021-04-14 PROCEDURE — 2580000003 HC RX 258: Performed by: NURSE ANESTHETIST, CERTIFIED REGISTERED

## 2021-04-14 PROCEDURE — 36415 COLL VENOUS BLD VENIPUNCTURE: CPT

## 2021-04-14 PROCEDURE — 7100000001 HC PACU RECOVERY - ADDTL 15 MIN: Performed by: UROLOGY

## 2021-04-14 PROCEDURE — 2580000003 HC RX 258: Performed by: INTERNAL MEDICINE

## 2021-04-14 RX ORDER — HYDROMORPHONE HCL 110MG/55ML
0.5 PATIENT CONTROLLED ANALGESIA SYRINGE INTRAVENOUS EVERY 5 MIN PRN
Status: DISCONTINUED | OUTPATIENT
Start: 2021-04-14 | End: 2021-04-14 | Stop reason: HOSPADM

## 2021-04-14 RX ORDER — MAGNESIUM HYDROXIDE 1200 MG/15ML
LIQUID ORAL
Status: COMPLETED | OUTPATIENT
Start: 2021-04-14 | End: 2021-04-14

## 2021-04-14 RX ORDER — PROPOFOL 10 MG/ML
INJECTION, EMULSION INTRAVENOUS PRN
Status: DISCONTINUED | OUTPATIENT
Start: 2021-04-14 | End: 2021-04-14 | Stop reason: SDUPTHER

## 2021-04-14 RX ORDER — ALBUTEROL SULFATE 2.5 MG/3ML
2.5 SOLUTION RESPIRATORY (INHALATION) ONCE
Status: COMPLETED | OUTPATIENT
Start: 2021-04-14 | End: 2021-04-14

## 2021-04-14 RX ORDER — FENTANYL CITRATE 50 UG/ML
INJECTION, SOLUTION INTRAMUSCULAR; INTRAVENOUS PRN
Status: DISCONTINUED | OUTPATIENT
Start: 2021-04-14 | End: 2021-04-14 | Stop reason: SDUPTHER

## 2021-04-14 RX ORDER — HYDROCODONE BITARTRATE AND ACETAMINOPHEN 5; 325 MG/1; MG/1
1 TABLET ORAL
Status: DISCONTINUED | OUTPATIENT
Start: 2021-04-14 | End: 2021-04-14 | Stop reason: HOSPADM

## 2021-04-14 RX ORDER — SODIUM CHLORIDE 9 MG/ML
INJECTION, SOLUTION INTRAVENOUS CONTINUOUS PRN
Status: DISCONTINUED | OUTPATIENT
Start: 2021-04-14 | End: 2021-04-14 | Stop reason: SDUPTHER

## 2021-04-14 RX ORDER — MAGNESIUM HYDROXIDE 1200 MG/15ML
LIQUID ORAL CONTINUOUS PRN
Status: COMPLETED | OUTPATIENT
Start: 2021-04-14 | End: 2021-04-14

## 2021-04-14 RX ORDER — EPHEDRINE SULFATE/0.9% NACL/PF 50 MG/5 ML
SYRINGE (ML) INTRAVENOUS PRN
Status: DISCONTINUED | OUTPATIENT
Start: 2021-04-14 | End: 2021-04-14 | Stop reason: SDUPTHER

## 2021-04-14 RX ORDER — CEFUROXIME AXETIL 250 MG/1
250 TABLET ORAL EVERY 12 HOURS SCHEDULED
Qty: 20 TABLET | Refills: 0 | Status: SHIPPED | OUTPATIENT
Start: 2021-04-14 | End: 2021-04-24

## 2021-04-14 RX ORDER — PROMETHAZINE HYDROCHLORIDE 25 MG/ML
6.25 INJECTION, SOLUTION INTRAMUSCULAR; INTRAVENOUS
Status: DISCONTINUED | OUTPATIENT
Start: 2021-04-14 | End: 2021-04-14 | Stop reason: HOSPADM

## 2021-04-14 RX ORDER — HYDROMORPHONE HCL 110MG/55ML
0.25 PATIENT CONTROLLED ANALGESIA SYRINGE INTRAVENOUS EVERY 5 MIN PRN
Status: DISCONTINUED | OUTPATIENT
Start: 2021-04-14 | End: 2021-04-14 | Stop reason: HOSPADM

## 2021-04-14 RX ORDER — GLYCINE 1.5 G/100ML
IRRIGANT IRRIGATION
Status: COMPLETED | OUTPATIENT
Start: 2021-04-14 | End: 2021-04-14

## 2021-04-14 RX ORDER — SODIUM CHLORIDE 9 MG/ML
INJECTION, SOLUTION INTRAVENOUS CONTINUOUS
Status: DISCONTINUED | OUTPATIENT
Start: 2021-04-14 | End: 2021-04-14

## 2021-04-14 RX ORDER — LIDOCAINE HYDROCHLORIDE 20 MG/ML
INJECTION, SOLUTION EPIDURAL; INFILTRATION; INTRACAUDAL; PERINEURAL PRN
Status: DISCONTINUED | OUTPATIENT
Start: 2021-04-14 | End: 2021-04-14 | Stop reason: SDUPTHER

## 2021-04-14 RX ORDER — GLYCOPYRROLATE 0.2 MG/ML
INJECTION INTRAMUSCULAR; INTRAVENOUS PRN
Status: DISCONTINUED | OUTPATIENT
Start: 2021-04-14 | End: 2021-04-14 | Stop reason: SDUPTHER

## 2021-04-14 RX ORDER — ACETAMINOPHEN, ASPIRIN AND CAFFEINE 250; 250; 65 MG/1; MG/1; MG/1
1 TABLET, FILM COATED ORAL EVERY 6 HOURS PRN
Qty: 90 TABLET | Refills: 3 | Status: SHIPPED | OUTPATIENT
Start: 2021-04-14

## 2021-04-14 RX ORDER — ONDANSETRON 2 MG/ML
INJECTION INTRAMUSCULAR; INTRAVENOUS PRN
Status: DISCONTINUED | OUTPATIENT
Start: 2021-04-14 | End: 2021-04-14 | Stop reason: SDUPTHER

## 2021-04-14 RX ORDER — CEFUROXIME AXETIL 250 MG/1
250 TABLET ORAL EVERY 12 HOURS SCHEDULED
Status: DISCONTINUED | OUTPATIENT
Start: 2021-04-14 | End: 2021-04-14 | Stop reason: HOSPADM

## 2021-04-14 RX ORDER — DEXAMETHASONE SODIUM PHOSPHATE 4 MG/ML
INJECTION, SOLUTION INTRA-ARTICULAR; INTRALESIONAL; INTRAMUSCULAR; INTRAVENOUS; SOFT TISSUE PRN
Status: DISCONTINUED | OUTPATIENT
Start: 2021-04-14 | End: 2021-04-14 | Stop reason: SDUPTHER

## 2021-04-14 RX ORDER — LIDOCAINE HYDROCHLORIDE 10 MG/ML
1 INJECTION, SOLUTION EPIDURAL; INFILTRATION; INTRACAUDAL; PERINEURAL
Status: DISCONTINUED | OUTPATIENT
Start: 2021-04-14 | End: 2021-04-14 | Stop reason: HOSPADM

## 2021-04-14 RX ADMIN — GLYCOPYRROLATE AND FORMOTEROL FUMARATE 2 PUFF: 9; 4.8 AEROSOL, METERED RESPIRATORY (INHALATION) at 07:57

## 2021-04-14 RX ADMIN — ONDANSETRON 4 MG: 2 INJECTION INTRAMUSCULAR; INTRAVENOUS at 10:20

## 2021-04-14 RX ADMIN — PHENYLEPHRINE HYDROCHLORIDE 50 MCG: 10 INJECTION INTRAVENOUS at 10:25

## 2021-04-14 RX ADMIN — GLYCOPYRROLATE 0.1 MG: 0.2 INJECTION INTRAMUSCULAR; INTRAVENOUS at 10:25

## 2021-04-14 RX ADMIN — PHENYLEPHRINE HYDROCHLORIDE 100 MCG: 10 INJECTION INTRAVENOUS at 10:24

## 2021-04-14 RX ADMIN — CEFUROXIME AXETIL 250 MG: 250 TABLET ORAL at 08:09

## 2021-04-14 RX ADMIN — ALBUTEROL SULFATE 2.5 MG: 2.5 SOLUTION RESPIRATORY (INHALATION) at 09:32

## 2021-04-14 RX ADMIN — Medication 10 MG: at 10:33

## 2021-04-14 RX ADMIN — DEXAMETHASONE SODIUM PHOSPHATE 4 MG: 4 INJECTION, SOLUTION INTRAMUSCULAR; INTRAVENOUS at 10:20

## 2021-04-14 RX ADMIN — FENTANYL CITRATE 50 MCG: 50 INJECTION, SOLUTION INTRAMUSCULAR; INTRAVENOUS at 10:17

## 2021-04-14 RX ADMIN — METFORMIN HYDROCHLORIDE 1000 MG: 500 TABLET ORAL at 16:29

## 2021-04-14 RX ADMIN — PROPOFOL 200 MG: 10 INJECTION, EMULSION INTRAVENOUS at 10:17

## 2021-04-14 RX ADMIN — BUDESONIDE 250 MCG: 0.5 SUSPENSION RESPIRATORY (INHALATION) at 07:56

## 2021-04-14 RX ADMIN — METOPROLOL SUCCINATE 100 MG: 50 TABLET, EXTENDED RELEASE ORAL at 08:06

## 2021-04-14 RX ADMIN — FENTANYL CITRATE 25 MCG: 50 INJECTION, SOLUTION INTRAMUSCULAR; INTRAVENOUS at 10:49

## 2021-04-14 RX ADMIN — LIDOCAINE HYDROCHLORIDE 100 MG: 20 INJECTION, SOLUTION EPIDURAL; INFILTRATION; INTRACAUDAL; PERINEURAL at 10:17

## 2021-04-14 RX ADMIN — PHENYLEPHRINE HYDROCHLORIDE 100 MCG: 10 INJECTION INTRAVENOUS at 10:31

## 2021-04-14 RX ADMIN — FENTANYL CITRATE 25 MCG: 50 INJECTION, SOLUTION INTRAMUSCULAR; INTRAVENOUS at 10:41

## 2021-04-14 RX ADMIN — SODIUM CHLORIDE: 9 INJECTION, SOLUTION INTRAVENOUS at 10:11

## 2021-04-14 RX ADMIN — SODIUM CHLORIDE: 9 INJECTION, SOLUTION INTRAVENOUS at 00:18

## 2021-04-14 RX ADMIN — Medication 10 MG: at 10:41

## 2021-04-14 ASSESSMENT — PULMONARY FUNCTION TESTS
PIF_VALUE: 17
PIF_VALUE: 26
PIF_VALUE: 0
PIF_VALUE: 4
PIF_VALUE: 18
PIF_VALUE: 16
PIF_VALUE: 15
PIF_VALUE: 17
PIF_VALUE: 15
PIF_VALUE: 18
PIF_VALUE: 3
PIF_VALUE: 17
PIF_VALUE: 4
PIF_VALUE: 5
PIF_VALUE: 15
PIF_VALUE: 15
PIF_VALUE: 16
PIF_VALUE: 1
PIF_VALUE: 15
PIF_VALUE: 15
PIF_VALUE: 0
PIF_VALUE: 17
PIF_VALUE: 18
PIF_VALUE: 15

## 2021-04-14 ASSESSMENT — PAIN DESCRIPTION - LOCATION: LOCATION: PENIS

## 2021-04-14 ASSESSMENT — PAIN SCALES - GENERAL
PAINLEVEL_OUTOF10: 0

## 2021-04-14 ASSESSMENT — COPD QUESTIONNAIRES: CAT_SEVERITY: SEVERE

## 2021-04-14 ASSESSMENT — PAIN DESCRIPTION - PAIN TYPE: TYPE: SURGICAL PAIN

## 2021-04-14 NOTE — ANESTHESIA POSTPROCEDURE EVALUATION
Department of Anesthesiology  Postprocedure Note    Patient: Juma Kelly  MRN: [id number]  YOB: 1950  Date of evaluation: 4/14/2021  Time:  11:22 AM     Procedure Summary     Date: 04/14/21 Room / Location: [address] [place] [address]    Anesthesia Start: 1011 Anesthesia Stop: 1107    Procedure: Whitley Mc AND EVACUATION OF CLOTS (N/A Urethra) Diagnosis: (Hematuria)    Surgeons: Lenwood Felty, MD Responsible Provider:     Anesthesia Type: general ASA Status: 3          Anesthesia Type: general    Jason Phase I: Jason Score: 8    Jason Phase II:      Last vitals: Reviewed and per EMR flowsheets.        Anesthesia Post Evaluation    Patient location during evaluation: PACU  Patient participation: complete - patient participated  Level of consciousness: awake  Airway patency: patent  Nausea & Vomiting: no vomiting  Complications: no  Cardiovascular status: hemodynamically stable  Respiratory status: acceptable  Hydration status: euvolemic

## 2021-04-14 NOTE — CARE COORDINATION
SW contacted by RN informing that pt is being discharged this evening and new [address] orders had been placed. SW reviewed pt's chart and saw [address] orders were put in for RN only for assistance with new catheter. SW spoke with pt who was agreeable with [facility]. Did not have a preference in agencies. SW informed would refer to [facility]. Pt agreeable. Verified pt's demographic information again. Faxed referral to [facility] at [phone]. All needs met per case mgmt at this time. Discharge Plan:  Home w/Critical access hospital. Referral faxed.     Electronically signed by DEBBIE Pritchard, GABRIEL on 4/14/2021 at 5:12 PM

## 2021-04-14 NOTE — PROGRESS NOTES
spontaneously  Skin: warmand well perfused, no rashes noted on the face, or arms.      Labs:  Lab Results   Component Value Date    WBC 6.7 04/14/2021    HGB 9.0 (L) 04/14/2021    HCT 27.7 (L) 04/14/2021    MCV 75.9 (L) 04/14/2021     04/14/2021     Lab Results   Component Value Date    CREATININE 0.8 04/14/2021    BUN 12 04/14/2021     04/14/2021    K 3.9 04/14/2021     04/14/2021    CO2 21 04/14/2021       Omi Mahoneyl APRN-CNP  4/14/2021

## 2021-04-14 NOTE — PLAN OF CARE
Problem: Sensory:  Goal: General experience of comfort will improve  Description: General experience of comfort will improve  4/14/2021 1354 by Mckenna Garcia RN  Outcome: Ongoing  4/14/2021 0123 by Francisco Javier Amin RN  Outcome: Ongoing     Problem: Urinary Elimination:  Goal: Signs and symptoms of infection will decrease  Description: Signs and symptoms of infection will decrease  4/14/2021 1354 by Mckenna Garcia RN  Outcome: Ongoing  4/14/2021 0123 by Francisco Javier Amin RN  Outcome: Ongoing  Goal: Ability to reestablish a normal urinary elimination pattern will improve - after catheter removal  Description: Ability to reestablish a normal urinary elimination pattern will improve  4/14/2021 1354 by Mckenna Garcia RN  Outcome: Ongoing  4/14/2021 0123 by Francisco Javier Amin RN  Outcome: Ongoing  Goal: Complications related to the disease process, condition or treatment will be avoided or minimized  Description: Complications related to the disease process, condition or treatment will be avoided or minimized  4/14/2021 1354 by Mckenna Garcia RN  Outcome: Ongoing  4/14/2021 0123 by Francisco Javier Amin RN  Outcome: Ongoing

## 2021-04-14 NOTE — PROGRESS NOTES
Patient provided with discharge instructions, discussed in detail, new medications reviewed including use and side effects. Patient verbalized understanding. Prescriptions sent to patient's pharmacy of [facility]. All questions answered, family at the bedside to transport home. Patient discharged home on 4/14/21 at [phone]. Discharging unit phone number [phone].

## 2021-04-14 NOTE — PROGRESS NOTES
Department of Internal Medicine  General Internal Medicine   Progress Note      SUBJECTIVE: hematuria  Decreasing abdominal discomfort improving bladder irrigation in progress     History obtained from chart review and the patient  General ROS: positive for  - fatigue and malaise  negative for - chills, fever, night sweats or weight loss  Psychological ROS: negative  Ophthalmic ROS: negative  Respiratory ROS: no cough, shortness of breath, or wheezing  Cardiovascular ROS: no chest pain or dyspnea on exertion  Gastrointestinal ROS: no abdominal pain, change in bowel habits, or black or bloody stools  Genito-Urinary ROS: positive for - change in urinary stream, dysuria, hematuria and urinary frequency/urgency  negative for - pelvic pain  Musculoskeletal ROS: negative  Neurological ROS: no TIA or stroke symptoms  Dermatological ROS: negative    OBJECTIVE      Medications      Current Facility-Administered Medications: aspirin-acetaminophen-caffeine (EXCEDRIN MIGRAINE) per tablet 1 tablet, 1 tablet, Oral, Q6H PRN  metoprolol succinate (TOPROL XL) extended release tablet 100 mg, 100 mg, Oral, Daily  acetaminophen (TYLENOL) tablet 650 mg, 650 mg, Oral, Q4H PRN  albuterol (PROVENTIL) nebulizer solution 2.5 mg, 2.5 mg, Nebulization, Q6H PRN  ipratropium-albuterol (DUONEB) nebulizer solution 1 ampule, 1 ampule, Inhalation, Q4H PRN  clonazePAM (KLONOPIN) tablet 0.5 mg, 0.5 mg, Oral, BID PRN  doxazosin (CARDURA) tablet 4 mg, 4 mg, Oral, Nightly  ferrous sulfate (IRON 325) tablet 325 mg, 325 mg, Oral, Daily with breakfast  FLUoxetine (PROZAC) capsule 20 mg, 20 mg, Oral, Daily  losartan (COZAAR) tablet 100 mg, 100 mg, Oral, Daily  metFORMIN (GLUCOPHAGE) tablet 1,000 mg, 1,000 mg, Oral, BID WC  budesonide (PULMICORT) nebulizer suspension 250 mcg, 0.25 mg, Nebulization, BID  nitroGLYCERIN (NITROSTAT) SL tablet 0.4 mg, 0.4 mg, Sublingual, Q5 Min PRN  Roflumilast (DALIRESP) tablet 500 mcg, 500 mcg, Oral, Daily  atorvastatin (LIPITOR) tablet 20 mg, 20 mg, Oral, Daily  spironolactone (ALDACTONE) tablet 25 mg, 25 mg, Oral, Daily  glycopyrrolate-formoterol (BEVESPI) 9-4.8 MCG/ACT inhaler 2 puff, 2 puff, Inhalation, BID  0.9 % sodium chloride infusion, , Intravenous, Continuous  cefTRIAXone (ROCEPHIN) 1000 mg in sterile water 10 mL IV syringe, 1,000 mg, Intravenous, Q24H  enoxaparin (LOVENOX) injection 40 mg, 40 mg, Subcutaneous, Daily    Physical      Vitals: BP (!) 150/89   Pulse 69   Temp 97.8 °F (36.6 °C) (Oral)   Resp 16   Ht 5' 11\" (1.803 m)   Wt 265 lb (120.2 kg)   SpO2 98%   BMI 36.96 kg/m²   Temp: Temp: 97.8 °F (36.6 °C)  Max: Temp  Av °F (36.7 °C)  Min: 97.6 °F (36.4 °C)  Max: 98.4 °F (36.9 °C)  Respiration range:  Resp  Avg: 15.8  Min: 15  Max: 16  Pulse Range:  Pulse  Av.3  Min: 62  Max: 73  Blood pressure range:  Systolic (00DVM), YHU:039 , Min:123 , ZUY:895   , Diastolic (57CQM), WBU:94, Min:78, Max:93    SpO2  Av.4 %  Min: 96 %  Max: 98 %    Intake/Output Summary (Last 24 hours) at 2021 0128  Last data filed at 2021 0019  Gross per 24 hour   Intake 2831 ml   Output 5525 ml   Net -2694 ml       Vent settings:  Pulse  Av.3  Min: 55  Max: 77  Resp  Av.2  Min: 1  Max: 20  SpO2  Av.8 %  Min: 88 %  Max: 100 %    CONSTITUTIONAL:  awake, alert, cooperative, no apparent distress, and appears stated age  EYES:  Unremarkable   NECK:  No JVD  and supple, symmetrical, trachea midline  BACK:  symmetric and no curvature  LUNGS:  No increased work of breathing, good air exchange, clear to auscultation bilaterally, no crackles or wheezing  CARDIOVASCULAR:  Normal apical impulse, regular rate and rhythm, normal S1 and S2, no S3 or S4, and no murmur noted  ABDOMEN:  Soft BS + non tender   MUSCULOSKELETAL:  Trace edema   NEUROLOGIC:  Grossly intact   SKIN:  Warm dry and pale  and no bruising or bleeding    Data      Recent Results (from the past 96 hour(s))   Urinalysis, reflex to microscopic Collection Time: 04/11/21  5:08 PM   Result Value Ref Range    Color, UA RED (A) Straw/Yellow    Clarity, UA TURBID (A) Clear    Glucose, Ur Negative Negative mg/dL    Bilirubin Urine Negative Negative    Ketones, Urine Negative Negative mg/dL    Specific Gravity, UA 1.025 1.005 - 1.030    Blood, Urine LARGE (A) Negative    pH, UA 7.0 5.0 - 8.0    Protein, UA >=300 (A) Negative mg/dL    Urobilinogen, Urine 0.2 <2.0 E.U./dL    Nitrite, Urine Negative Negative    Leukocyte Esterase, Urine LARGE (A) Negative    Microscopic Examination YES     Urine Type NotGiven    Culture, Urine    Collection Time: 04/11/21  5:08 PM    Specimen: Urine, clean catch   Result Value Ref Range    Urine Culture, Routine No growth at 18 to 36 hours    Microscopic Urinalysis    Collection Time: 04/11/21  5:08 PM   Result Value Ref Range    WBC, UA see below (A) 0 - 5 /HPF    RBC, UA >100 (A) 0 - 4 /HPF   CBC Auto Differential    Collection Time: 04/11/21  5:33 PM   Result Value Ref Range    WBC 14.0 (H) 4.0 - 11.0 K/uL    RBC 5.17 4.20 - 5.90 M/uL    Hemoglobin 12.4 (L) 13.5 - 17.5 g/dL    Hematocrit 38.9 (L) 40.5 - 52.5 %    MCV 75.3 (L) 80.0 - 100.0 fL    MCH 24.1 (L) 26.0 - 34.0 pg    MCHC 32.0 31.0 - 36.0 g/dL    RDW 17.1 (H) 12.4 - 15.4 %    Platelets 185 978 - 392 K/uL    MPV 7.9 5.0 - 10.5 fL    Neutrophils % 92.0 %    Lymphocytes % 5.0 %    Monocytes % 3.0 %    Eosinophils % 0.0 %    Basophils % 0.0 %    Neutrophils Absolute 12.9 (H) 1.7 - 7.7 K/uL    Lymphocytes Absolute 0.7 (L) 1.0 - 5.1 K/uL    Monocytes Absolute 0.4 0.0 - 1.3 K/uL    Eosinophils Absolute 0.0 0.0 - 0.6 K/uL    Basophils Absolute 0.0 0.0 - 0.2 K/uL    Anisocytosis 1+ (A)     Microcytes 1+ (A)     Hypochromia Occasional (A)    Comprehensive Metabolic Panel w/ Reflex to MG    Collection Time: 04/11/21  5:33 PM   Result Value Ref Range    Sodium 138 136 - 145 mmol/L    Potassium reflex Magnesium 4.1 3.5 - 5.1 mmol/L    Chloride 103 99 - 110 mmol/L    CO2 24 21 - 32 pectoris ([address]    Cardiomyopathy ([address]    COPD, severe ([address]    UTI (urinary tract infection)  Resolved Problems:    Hemoptysis    Bladder irrigation    IV antibiotics    urine culture   Urology consult

## 2021-04-14 NOTE — OP NOTE
Urology Operative Note  [facility]     Patient: Alexy Diaz MRN: [id number]  Room/Bed: OR/NONE   YOB: 1950  Age/Sex: 80 y.o.male  Admission Date: 4/11/2021     Date of Operation: 4/14/2021    Preoperative Diagnosis: Gross hematuria with clot retention after TURP    Postoperative Diagnosis: Same    Procedure:    1. Cystourethroscopy with clot evacuation and fulguration     Surgeon:   Marie Moy MD, CHRISTIANO    [facility]    Indications: Alexy Diaz is a 80 y.o. male who presents for the above named surgery. Informed consent was obtained and the risks, benefits, and details of the procedure were explained to the patient who elected to proceed. Details of Procedure: The patient was brought to the operating room and placed in the supine position on the operating room table. SCDs were placed on the lower extremities. Following induction of anesthesia the patient was positioned in a supine position, all pressure points were padded, and the genitals were prepped and draped in the usual sterile fashion with administration of local anesthesia with lidocaine jelly. A routine timeout was performed, confirming the patient, procedure, site, risk of fire, patient allergies and confirming that preoperative antibiotics had been administered prior to beginning. A 21 Japanese rigid cystoscope was passed atraumatically per urethra into the bladder. The urethra demonstrated a open TURP defect. There was some tissue anteriorly remaining but overall the prostatic urethral channel was wide open. On the left side of the prostatic urethra there was some minimal bleeding, possible source for his ongoing hematuria. The bladder was then thoroughly examined. There was a large well-formed clot in the bladder floor. There is also a site on the posterior wall with some mild bleeding, possibly associated with the catheter.   Otherwise there was bullous edema and signs of catheter associated cystitis diffusely but no there were no stones, tumors, diverticula, or other abnormality seen. The prior bladder biopsy site was well-healed. The bilateral ureteral orifices were in their orthotopic location and demonstrated efflux of clear yellow urine. I then proceeded with clot evacuation and removed the entirety of the well-formed clot with gentle irrigation. I then advanced the resectoscope and using the rollerball cauterized the posterior wall as well as the prostatic urethra, taking careful attention to the left side where there was bleeding noted. This was done until hemostasis was confirmed. The bladder was emptied and filled again and excellent hemostasis was demonstrated. Catheter was replaced and was draining clear urine at the end the procedure. At the end of the procedure all counts were correct. The patient tolerated the procedure well and was transported to the PACU in stable condition. Findings: Large well-formed clot, hematuria from TURP defect, posterior wall, catheter associated cystitis, no evidence of obstruction with wide open TURP channel there is some tissue anteriorly remained    Estimated Blood Loss:  We will                 Drains: 20 [employer]el[employer] coudé catheter          Specimens: None    Complications: None apparent           Disposition:  PACU - stable    Plan: Follow catheter postoperatively would keep in place for 1 week then voiding trial in clinic, okay for discharge today if remains clear and patient remains otherwise stable           Electronically signed by: Tayla Garza MD, CHRISTIANO, 4/14/2021  [facility]  Office Contact: [phone]

## 2021-04-14 NOTE — ANESTHESIA PRE PROCEDURE
Department of Anesthesiology  Preprocedure Note       Name:  Juma Kelly   Age:  80 y.o.  :  1950                                          MRN:  [id number]         Date:  2021      Surgeon: Gutierrez Brenner):  Lenwood Felty, MD    Procedure: Procedure(s):  CYSTOSCOPY EVACUATION OF CLOTS    Medications prior to admission:   Prior to Admission medications    Medication Sig Start Date End Date Taking?  Authorizing Provider   aspirin 81 MG EC tablet Take 81 mg by mouth daily   Yes Historical Provider, MD   furosemide (LASIX) 40 MG tablet TAKE 1 TABLET BY MOUTH DAILY 21  Yes Jose Francisco Michaud MD   umeclidinium-vilanterol (ANORO ELLIPTA) 62.5-25 MCG/INH AEPB inhaler Inhale 1 puff into the lungs daily INHALE 1 PUFF INTO THE LUNGS DAILY 21  Yes Regan Arzate MD   ferrous sulfate (IRON 325) 325 (65 Fe) MG tablet TAKE 1 TABLET BY MOUTH DAILY WITH BREAKFAST 20  Yes Historical Provider, MD   FLUoxetine (PROZAC) 20 MG capsule TK 1 C PO D 20  Yes Historical Provider, MD   metoprolol succinate (TOPROL XL) 50 MG extended release tablet TAKE 1 TABLET BY MOUTH DAILY 20  Yes Jose Francisco Michaud MD   mometasone (ASMANEX, 120 METERED DOSES,) 220 MCG/INH inhaler Inhale 2 puffs into the lungs 2 times daily 20 Yes Regan Arzate MD   albuterol sulfate HFA (PROVENTIL HFA) 108 (90 Base) MCG/ACT inhaler Inhale 2 puffs into the lungs every 4 hours as needed for Wheezing 20  Yes Regan Arzate MD   losartan (COZAAR) 100 MG tablet Take 1 tablet by mouth daily 20  Yes Jose Francisco Michaud MD   albuterol (PROVENTIL) (2.5 MG/3ML) 0.083% nebulizer solution Take 3 mLs by nebulization every 6 hours as needed for Wheezing DX:COPD J44.9 20  Yes Regan Arzate MD   nitroGLYCERIN (NITROSTAT) 0.4 MG SL tablet Place 1 tablet under the tongue every 5 minutes as needed for Chest pain 3/4/20  Yes Jose Francisco Michaud MD   clonazePAM (KLONOPIN) 1 MG tablet Take 1 mg by mouth 2 times daily as needed for Anxiety   Yes Historical Provider, MD   metFORMIN (GLUCOPHAGE) 1000 MG tablet Take 1,000 mg by mouth 2 times daily (with meals)    Yes Historical Provider, MD   simvastatin (ZOCOR) 40 MG tablet Take 0.5 tablets by mouth nightly 5/15/15  Yes Joycelyn Apley, APRN - CNP   DALIRESP 500 MCG tablet TAKE 1 TABLET BY MOUTH DAILY 4/12/21   Jackey Duverney, MD   spironolactone (ALDACTONE) 25 MG tablet TAKE 1 TABLET BY MOUTH DAILY 4/12/21   Rosamaria Carter MD   doxazosin (CARDURA) 4 MG tablet Take 1 tablet by mouth nightly  Patient taking differently: Take 4 mg by mouth nightly PT STATES HASN'T TAKEN IN A FEW DAYS.   INSTRUCTED HIM TO GET IT REFILLED 6/3/20   ALEXEI Robles CNP       Current medications:    Current Facility-Administered Medications   Medication Dose Route Frequency Provider Last Rate Last Admin    cefUROXime (CEFTIN) tablet 250 mg  250 mg Oral 2 times per day Lindsay Mcclain MD   250 mg at 04/14/21 0809    HYDROcodone-acetaminophen (NORCO) 5-325 MG per tablet 1 tablet  1 tablet Oral Once PRN Berniece Rinne, MD        HYDROmorphone (DILAUDID) injection 0.25 mg  0.25 mg Intravenous Q5 Min PRN Berniece Rinne, MD        HYDROmorphone (DILAUDID) injection 0.5 mg  0.5 mg Intravenous Q5 Min PRN Berniece Rinne, MD        promethazine (PHENERGAN) injection 6.25 mg  6.25 mg Intravenous Once PRN Berniece Rinne, MD        aspirin-acetaminophen-caffeine [facility] MIGRAINE) per tablet 1 tablet  1 tablet Oral Q6H PRN Lindsay Mcclain MD        metoprolol succinate (TOPROL XL) extended release tablet 100 mg  100 mg Oral Daily Lindsay Mcclain MD   100 mg at 04/14/21 0806    acetaminophen (TYLENOL) tablet 650 mg  650 mg Oral Q4H PRN Lindsay Mcclain MD   650 mg at 04/13/21 2054    albuterol (PROVENTIL) nebulizer solution 2.5 mg  2.5 mg Nebulization Q6H PRN Lindsay Mcclain MD        ipratropium-albuterol (DUONEB) nebulizer solution 1 ampule  1 ampule Inhalation Q4H PRN Jefry Holloway MD        clonazePAM Elizebeth Lat) tablet 0.5 mg  0.5 mg Oral BID PRN Jefry Holloway MD   0.5 mg at 04/13/21 2240    doxazosin (CARDURA) tablet 4 mg  4 mg Oral Nightly Jefry Holloway MD   4 mg at 04/13/21 2054    ferrous sulfate (IRON 325) tablet 325 mg  325 mg Oral Daily with breakfast Jefry Holloway MD   Stopped at 04/14/21 0809    FLUoxetine (PROZAC) capsule 20 mg  20 mg Oral Daily Jefry Holloway MD   Stopped at 04/14/21 0810    losartan (COZAAR) tablet 100 mg  100 mg Oral Daily Jefry Holloway MD   Stopped at 04/14/21 0810    metFORMIN (GLUCOPHAGE) tablet 1,000 mg  1,000 mg Oral BID WC Jefry Holloway MD   Stopped at 04/14/21 0810    budesonide (PULMICORT) nebulizer suspension 250 mcg  0.25 mg Nebulization BID Jefry Holloway MD   250 mcg at 04/14/21 0756    nitroGLYCERIN (NITROSTAT) SL tablet 0.4 mg  0.4 mg Sublingual Q5 Min PRN Jefry Holloway MD        Roflumilast (DALIRESP) tablet 500 mcg  500 mcg Oral Daily Jefry Holloway MD   Stopped at 04/14/21 0810    atorvastatin (LIPITOR) tablet 20 mg  20 mg Oral Daily Jefry Holloway MD   Stopped at 04/14/21 0809    spironolactone (ALDACTONE) tablet 25 mg  25 mg Oral Daily Jefry Holloway MD   Stopped at 04/14/21 0810    glycopyrrolate-formoterol (BEVESPI) 9-4.8 MCG/ACT inhaler 2 puff  2 puff Inhalation BID Jefry Holloway MD   2 puff at 04/14/21 0757    0.9 % sodium chloride infusion   Intravenous Continuous Jefry Holloway  mL/hr at 04/14/21 0018 New Bag at 04/14/21 0018    enoxaparin (LOVENOX) injection 40 mg  40 mg Subcutaneous Daily Jefry Holloway MD   Stopped at 04/13/21 1555       Allergies:     Allergies   Allergen Reactions    Lisinopril Other (See Comments)     COUGH       Problem List:    Patient Active Problem List   Diagnosis Code    Mixed hyperlipidemia E78.2    Left ventricular hypertrophy I51.7    Obesity E66.9    Essential hypertension, malignant I10    KARYNA (obstructive sleep apnea) G47.33    Restrictive lung disease J98.4    Coronary artery disease involving native coronary artery of native heart with angina pectoris ([facility] I25.119    Cardiomyopathy ([facility]) I42.9    COPD, severe ([facility]) J44.9    UTI (urinary tract infection) N39.0       Past Medical History:        Diagnosis Date    [facility]) [facility]     Coronary artery disease involving native coronary artery of native heart with angina pectoris ([facility] 1/18/2017    Depression     Diabetes mellitus ([facility]     type II    Dysphagia     Enlarged prostate     Heart enlarged     Hyperlipidemia     Hypertension     MRSA (methicillin resistant staph aureus) culture positive 08/17/2018    sputum    Obesity     Pneumonia     multiple times 2018    Sleep apnea     has cpap but hasn't used \"in a long time\"       Past Surgical History:        Procedure Laterality Date    BRONCHOSCOPY  04/25/2018    CHOLECYSTECTOMY      CORONARY ANGIOPLASTY  01/11/2017    CYSTOSCOPY W BIOPSY OF BLADDER N/A 3/15/2021    RIGID CYSTOSCOPY WITH RESECTION OF BLADDER TUMOR performed by Luisa Roland MD at [address] [place] [address]      teeth removed    GALLBLADDER SURGERY      gall stones    PROSTATE SURGERY      TURP N/A 3/15/2021    WITH TRANSURETHRAL RESECTION OF PROSTATE performed by Luisa Roland MD at [address] [place] [address] History:    Social History     Tobacco Use    Smoking status: Former Smoker     Packs/day: 0.25     Years: 25.00     Pack years: 6.25     Types: Cigarettes, Pipe, Cigars     Quit date: 1/1/2011     Years since quitting: 10.2    Smokeless tobacco: Former User    Tobacco comment: per patient, has not smoked in 15 years   Substance Use Topics    Alcohol use: Not Currently     Comment: per patient, has not drank in 15 years                                Counseling given: Not Answered  Comment: per patient, has not smoked in 15 years      Vital Signs (Current):   Vitals:    04/14/21 0009 04/14/21 0303 04/14/21 0757 04/14/21 0804   BP: (!) 150/89 108/61  114/75   Pulse: 69 67  68   Resp: 16 14 14 16   Temp: 97.8 °F (36.6 °C) 97.7 °F (36.5 °C)  97.7 °F (36.5 °C)   TempSrc: Oral Oral  Oral   SpO2:  97% 97% 97%   Weight:       Height:                                                  BP Readings from Last 3 Encounters:   04/14/21 114/75   03/15/21 122/74   03/15/21 (!) 150/71       NPO Status:                                                                                 BMI:   Wt Readings from Last 3 Encounters:   04/11/21 265 lb (120.2 kg)   03/11/21 270 lb (122.5 kg)   12/16/20 284 lb (128.8 kg)     Body mass index is 36.96 kg/m². CBC:   Lab Results   Component Value Date    WBC 6.7 04/14/2021    RBC 3.65 04/14/2021    HGB 9.0 04/14/2021    HCT 27.7 04/14/2021    MCV 75.9 04/14/2021    RDW 16.8 04/14/2021     04/14/2021       CMP:   Lab Results   Component Value Date     04/14/2021    K 3.9 04/14/2021    K 4.1 04/11/2021     04/14/2021    CO2 21 04/14/2021    BUN 12 04/14/2021    CREATININE 0.8 04/14/2021    GFRAA >60 04/14/2021    GFRAA >60 09/13/2012    AGRATIO 1.4 04/11/2021    LABGLOM >60 04/14/2021    GLUCOSE 103 04/14/2021    GLUCOSE 108 12/11/2018    PROT 7.0 04/11/2021    CALCIUM 8.1 04/14/2021    BILITOT 0.5 04/11/2021    ALKPHOS 123 04/11/2021    AST 13 04/11/2021    ALT 12 04/11/2021       POC Tests: No results for input(s): POCGLU, POCNA, POCK, POCCL, POCBUN, POCHEMO, POCHCT in the last 72 hours.     Coags:   Lab Results   Component Value Date    PROTIME 12.9 04/11/2021    INR 1.11 04/11/2021    APTT 29.3 04/11/2021       HCG (If Applicable): No results found for: PREGTESTUR, PREGSERUM, HCG, HCGQUANT     ABGs: No results found for: PHART, PO2ART, AWG9QVY, BCN1VLS, BEART, B1IWTVNO     Type & Screen (If Applicable):  No results found for: LABABO, LABRH    Drug/Infectious Status (If Applicable):  No results found for: HIV, HEPCAB    COVID-19 Screening (If Applicable):   Lab Results   Component Value Date    COVID19 Not Detected 03/12/2021           Anesthesia Evaluation  Patient summary reviewed no history of anesthetic complications:   Airway: Mallampati: II  TM distance: >3 FB   Neck ROM: full  Mouth opening: > = 3 FB Dental:    (+) edentulous      Pulmonary:   (+) pneumonia: resolved,  COPD: severe,  sleep apnea: on noncompliant,      (-) rhonchi and wheezes                           Cardiovascular:  Exercise tolerance: poor (<4 METS),   (+) hypertension:, CAD:, CABG/stent (stents 2017):,         Rhythm: regular  Rate: normal                 ROS comment: Conclusions      Summary   Left ventricular cavity size is mildly dilated. Mild concentric left ventricular hypertrophy. Overall, left ventricular systolic function appears mildly reduced with an   ejection fraction in the 45-50% range. Cannot exclude regional wall motion abnormalities secondary to poor   endocardial visualization. Diastolic filling parameters suggest grade I diastolic dysfunction. Trivial mitral regurgitation. The right ventricle is normal in size and function. Unable to estimate pulmonary artery pressure secondary to incomplete/absent   TR jet envelope. Patient denies CP or NTG use in past year     Neuro/Psych:   (+) psychiatric history:   (-) seizures, TIA and CVA           GI/Hepatic/Renal:        (-) GERD      ROS comment: Patient denies h/o gerd. No acid reflux symptoms today or last night. No recent n/v.   Endo/Other:    (+) Diabetes, blood dyscrasia (last plavix 2-3 days ago): anticoagulation therapy:., .                 Abdominal:           Vascular:                                      Anesthesia Plan      general     ASA 3       Induction: intravenous. Anesthetic plan and risks discussed with patient. Plan discussed with CRNA.                   Isidro Dorantes MD   4/14/2021

## 2021-04-14 NOTE — PROGRESS NOTES
Assessment complete. Alert, oriented x4. Jewell emptied of bloody, pink urine with few clots. Jewell care with jewell wipes. C/o headache. Message sent to urologist. Antoniokelsie Nessa not give Excedrin as it contains ASA. Given PRN Tylenol per MAR. The care plan and education has been reviewed and mutually agreed upon with the patient. In bed, bed alarm on, bed in lowest position, call light and bedside table within reach. No further needs expressed at this time. 0020  NPO at this time.

## 2021-04-14 NOTE — DISCHARGE INSTR - COC
Continuity of Care Form    Patient Name: Benji Williamson   :  1950  MRN:  [id number]    [facility] date:  2021  Discharge date:  2021    Code Status Order: Full Code   Advance Directives:   [facility] Documentation       Date/Time Healthcare Directive Type of Healthcare Directive Copy in [address] Agent's Name Healthcare Agent's Phone Number    21  No, patient does not have an advance directive for healthcare treatment -- -- -- -- --            Admitting Physician:  Lana Davis MD  PCP: Sri Blanco MD    Discharging Nurse: Rufina Doe Unit/Room#: 3BB-9193/1256-76  Discharging Unit Phone Number: [phone]    Emergency Contact:   Extended Emergency Contact Information  Primary Emergency Contact: Lana [address] [facility] of [facility] Phone: [phone]  Mobile Phone: [phone]  Relation: Domestic Partner    Past Surgical History:  Past Surgical History:   Procedure Laterality Date    BRONCHOSCOPY  2018    CHOLECYSTECTOMY      CORONARY ANGIOPLASTY  2017    CYSTOSCOPY N/A 2021    CYSTOSCOPY FULGARATION AND EVACUATION OF CLOTS performed by Jordan Garza MD at [address] [place] [address] N/A 3/15/2021    RIGID CYSTOSCOPY WITH RESECTION OF BLADDER TUMOR performed by Enoch Russell MD at [address] [place] [address]      teeth removed    GALLBLADDER SURGERY      gall stones    PROSTATE SURGERY      TURP N/A 3/15/2021    WITH TRANSURETHRAL RESECTION OF PROSTATE performed by Enoch Russell MD at [address] [place] [address]       Immunization History:   Immunization History   Administered Date(s) Administered    Influenza Vaccine, unspecified formulation 10/23/2015    Influenza, High Dose (Fluzone 65 yrs and older) 10/23/2015, 10/18/2016, 11/15/2017    Pneumococcal Conjugate 13-valent (Xblyuiy91) 2015    Pneumococcal Polysaccharide (Ahnytkrww35) 11/15/2017    Tdap (Boostrix, Adacel) 02/15/2018       Active Problems:  Patient Active Problem List   Diagnosis Code    Mixed hyperlipidemia E78.2    Left ventricular hypertrophy I51.7    Obesity E66.9    Essential hypertension, malignant I10    KARYNA (obstructive sleep apnea) G47.33    Restrictive lung disease J98.4    Coronary artery disease involving native coronary artery of native heart with angina pectoris ([facility] [address] [address] I25.119    Cardiomyopathy ([facility] I42.9    COPD, severe ([facility] [address] J44.9    UTI (urinary tract infection) N39.0       Isolation/Infection:   Isolation            No Isolation          Patient Infection Status       Infection Onset Added Last Indicated Last Indicated By Review Planned Expiration Resolved Resolved By    None active    Resolved    MRSA  08/24/18 08/24/18 Reymundo Walker, JOSHUA   04/12/21 Harlan Kaufman RN    8/17/18; sputum            Nurse Assessment:  Last Vital Signs: /75   Pulse 70   Temp 97.9 °F (36.6 °C) (Oral)   Resp 16   Ht 5' 11\" (1.803 m)   Wt 265 lb (120.2 kg)   SpO2 95%   BMI 36.96 kg/m²     Last documented pain score (0-10 scale): Pain Level: 0  Last Weight:   Wt Readings from Last 1 Encounters:   04/11/21 265 lb (120.2 kg)     Mental Status:  oriented and alert    IV Access:  - None    Nursing Mobility/ADLs:  Walking   Independent  Transfer  Independent  Bathing  Independent  Dressing  Independent  Toileting  Independent  Feeding  Independent  Med Admin  Independent  Med Delivery   whole    Wound Care Documentation and Therapy:        Elimination:  Continence:   · Bowel: Yes  · Bladder: Yes  Urinary Catheter:  Insertion Date: 4/14/21 and Indication for Use of Catheter: Urology/Urologist seeing this patient or inserted indwelling catheter   Colostomy/Ileostomy/Ileal Conduit: No       Date of Last BM: 4/14/21    Intake/Output Summary (Last 24 hours) at 4/14/2021 1258  Last data filed at 4/14/2021 1100  Gross per 24 hour   Intake 3228 ml   Output 3775 ml   Net -547 ml     I/O last 3 completed shifts: In: 2728 [P.O.:600; I.V.:2128]  Out: 5475 [Urine:5475]    Safety Concerns: At Risk for Falls    Impairments/Disabilities:      None    Nutrition Therapy:  Current Nutrition Therapy:   - Oral Diet:  Cardiac    Routes of Feeding: Oral  Liquids: No Restrictions  Daily Fluid Restriction: no  Last Modified Barium Swallow with Video (Video Swallowing Test): not done    Treatments at the Time of Hospital Discharge:   Respiratory Treatments: Daily respiratory meds for COPD  Oxygen Therapy:  is not on home oxygen therapy. Ventilator:    - No ventilator support    Rehab Therapies: Physical Therapy and Occupational Therapy  Weight Bearing Status/Restrictions: No weight bearing restirctions  Other Medical Equipment (for information only, NOT a DME order):  walker  Other Treatments: N/A    Patient's personal belongings (please select all that are sent with patient):  None    RN SIGNATURE:  Electronically signed by Jamar Fuentes RN on 4/14/21 at 4:40 PM EDT    CASE MANAGEMENT/SOCIAL WORK SECTION    Inpatient Status Date: 04/11/21    Readmission Risk Assessment Score:  Readmission Risk              Risk of Unplanned Readmission:        14           Discharging to Facility/ Agency   · Name:  [facility]  · Phone:  [phone]  · Fax:  [phone]        / signature: Electronically signed by DEBBIE Rogers, GABRIEL on 4/14/21 at 4:52 PM EDT    PHYSICIAN SECTION    Prognosis: Guarded    Condition at Discharge: Stable    Rehab Potential (if transferring to Rehab): Fair    Recommended Labs or Other Treatments After Discharge: PT OT leave catheter 1 week and then voiding trial follow up with Urology in 1 week     Physician Certification: I certify the above information and transfer of Stephanie Pichardo  is necessary for the continuing treatment of the diagnosis listed and that he requires [address] for less 30 days.      Update Admission H&P: No

## 2021-04-15 RX ORDER — CLOPIDOGREL BISULFATE 75 MG/1
75 TABLET ORAL DAILY
Qty: 90 TABLET | Refills: 3 | Status: SHIPPED | OUTPATIENT
Start: 2021-04-15 | End: 2022-04-15

## 2021-04-20 NOTE — PROGRESS NOTES
Physician Progress Note      PATIENT:               Sindhu Arango  CSN #:                  [id number]  :                       1950  ADMIT DATE:       2021 4:13 PM  [address] [place] DATE:        2021 6:30 PM  RESPONDING  PROVIDER #:        Desiree Youssef MD          QUERY TEXT:    Dear Dr. Nadeem Martinez,    Pt admitted with gross hematuria. Pt noted to have TURP defect. If possible,   please document in progress notes and discharge summary the relationship, if   any, between gross hematuria  and TURP defect. The medical record reflects the following:  Risk Factors: Recent TURP  Clinical Indicators: Per Urology: Gross hematuria with clot retention after   TURP. Cystourethroscopy Findings: Large well-formed clot, hematuria from TURP   defect, posterior wall, catheter associated cystitis. Cherry red urine with   clots. h/h - 12.4/34.7 redrawn 11.3/34.7 - 9.0/27.7  Treatment: Discharged home w/jewell catheter ,cysto, Urology consult  Thank you, [email or web address]  [id number] ext. [phone]  Options provided:  -- Gross hematuria due to TURP defect  -- Gross hematuria unrelated to TURP defect  -- Other - I will add my own diagnosis  -- Disagree - Not applicable / Not valid  -- Disagree - Clinically unable to determine / Unknown  -- Refer to Clinical Documentation Reviewer    PROVIDER RESPONSE TEXT:    This patient has Gross hematuria due to TURP defect.     Query created by: Elton Thompson on 4/15/2021 9:16 AM      Electronically signed by:  Desiree Youssef MD 2021 3:10 PM

## 2021-04-21 ENCOUNTER — HOSPITAL ENCOUNTER (OUTPATIENT)
Age: 71
Discharge: HOME | End: 2021-04-21
Payer: MEDICARE

## 2021-04-21 LAB
ANION GAP SERPL CALCULATED.3IONS-SCNC: 10 MMOL/L (ref 3–16)
BASOPHILS ABSOLUTE: 0.1 K/UL (ref 0–0.2)
BASOPHILS RELATIVE PERCENT: 0.9 %
BUN BLDV-MCNC: 22 MG/DL (ref 7–20)
CALCIUM SERPL-MCNC: 8.3 MG/DL (ref 8.3–10.6)
CHLORIDE BLD-SCNC: 103 MMOL/L (ref 99–110)
CO2: 24 MMOL/L (ref 21–32)
CREAT SERPL-MCNC: 0.9 MG/DL (ref 0.8–1.3)
EOSINOPHILS ABSOLUTE: 0.2 K/UL (ref 0–0.6)
EOSINOPHILS RELATIVE PERCENT: 3.2 %
GFR AFRICAN AMERICAN: >60
GFR NON-AFRICAN AMERICAN: >60
GLUCOSE BLD-MCNC: 104 MG/DL (ref 70–99)
HCT VFR BLD CALC: 31.2 % (ref 40.5–52.5)
HEMOGLOBIN: 10.3 G/DL (ref 13.5–17.5)
LYMPHOCYTES ABSOLUTE: 0.8 K/UL (ref 1–5.1)
LYMPHOCYTES RELATIVE PERCENT: 11.3 %
MCH RBC QN AUTO: 24.6 PG (ref 26–34)
MCHC RBC AUTO-ENTMCNC: 33 G/DL (ref 31–36)
MCV RBC AUTO: 74.5 FL (ref 80–100)
MONOCYTES ABSOLUTE: 0.5 K/UL (ref 0–1.3)
MONOCYTES RELATIVE PERCENT: 6.6 %
NEUTROPHILS ABSOLUTE: 5.8 K/UL (ref 1.7–7.7)
NEUTROPHILS RELATIVE PERCENT: 78 %
PDW BLD-RTO: 17.2 % (ref 12.4–15.4)
PLATELET # BLD: 201 K/UL (ref 135–450)
PMV BLD AUTO: 8.1 FL (ref 5–10.5)
POTASSIUM SERPL-SCNC: 4.3 MMOL/L (ref 3.5–5.1)
RBC # BLD: 4.19 M/UL (ref 4.2–5.9)
SODIUM BLD-SCNC: 137 MMOL/L (ref 136–145)
WBC # BLD: 7.4 K/UL (ref 4–11)

## 2021-04-21 PROCEDURE — 80048 BASIC METABOLIC PNL TOTAL CA: CPT

## 2021-04-21 PROCEDURE — 36415 COLL VENOUS BLD VENIPUNCTURE: CPT

## 2021-04-21 PROCEDURE — 85025 COMPLETE CBC W/AUTO DIFF WBC: CPT

## 2021-05-11 PROBLEM — N39.0 UTI (URINARY TRACT INFECTION): Status: RESOLVED | Noted: 2021-04-11 | Resolved: 2021-05-11

## 2021-05-13 RX ORDER — LOSARTAN POTASSIUM 100 MG/1
100 TABLET ORAL DAILY
Qty: 90 TABLET | Refills: 3 | Status: ON HOLD | OUTPATIENT
Start: 2021-05-13 | End: 2022-01-20 | Stop reason: HOSPADM

## 2021-05-18 NOTE — DISCHARGE SUMMARY
condition  stabilized. Urology consultation was obtained. The patient treated  with irrigation of the bladder. Dr. Mirlande Jiang saw the patient for  urology consultation and made his recommendation. After 2-3 days, the  patient was switched to oral antibiotic. The urine was returning clear. The patient did have some post TURP hematuria. Urology finally signed  off. The patient was to see Dr. Nadeem Loja as an outpatient. Dr. Nadeem Loja did  cystourethroscopy with clot evacuation and fulguration. Postoperatively, the patient did well. Urine became clear. The patient  was discharged in stable condition. The patient was discharged to  skilled nursing facility.         Tara Kim MD    D: 05/17/2021 23:49:21       T: 05/17/2021 23:52:33     [email or web address]  Job#: [id number]     Doc#: [id number]    CC:

## 2021-05-28 ENCOUNTER — TELEPHONE (OUTPATIENT)
Dept: PULMONOLOGY | Age: 71
End: 2021-05-28

## 2021-05-28 RX ORDER — DOXYCYCLINE HYCLATE 100 MG
100 TABLET ORAL DAILY
Qty: 10 TABLET | Refills: 0 | Status: SHIPPED | OUTPATIENT
Start: 2021-05-28 | End: 2021-06-07

## 2021-05-28 NOTE — TELEPHONE ENCOUNTER
Pt called and said he is coughing up a lot of yellow phlegm and is very sob when doing any activity sitting his pulse ox is 92 he stated, pt said he is not sure what to do    Call pt at [phone]

## 2021-06-16 ENCOUNTER — OFFICE VISIT (OUTPATIENT)
Dept: PULMONOLOGY | Age: 71
End: 2021-06-16
Payer: MEDICARE

## 2021-06-16 VITALS — DIASTOLIC BLOOD PRESSURE: 61 MMHG | OXYGEN SATURATION: 98 % | SYSTOLIC BLOOD PRESSURE: 138 MMHG | HEART RATE: 65 BPM

## 2021-06-16 DIAGNOSIS — R06.02 SOB (SHORTNESS OF BREATH): Primary | ICD-10-CM

## 2021-06-16 DIAGNOSIS — J44.9 COPD, SEVERE (HCC): ICD-10-CM

## 2021-06-16 DIAGNOSIS — R05.3 CHRONIC COUGH: ICD-10-CM

## 2021-06-16 DIAGNOSIS — I25.5 ISCHEMIC CARDIOMYOPATHY: ICD-10-CM

## 2021-06-16 PROCEDURE — G8427 DOCREV CUR MEDS BY ELIG CLIN: HCPCS | Performed by: INTERNAL MEDICINE

## 2021-06-16 PROCEDURE — 1036F TOBACCO NON-USER: CPT | Performed by: INTERNAL MEDICINE

## 2021-06-16 PROCEDURE — 4040F PNEUMOC VAC/ADMIN/RCVD: CPT | Performed by: INTERNAL MEDICINE

## 2021-06-16 PROCEDURE — G8417 CALC BMI ABV UP PARAM F/U: HCPCS | Performed by: INTERNAL MEDICINE

## 2021-06-16 PROCEDURE — 3023F SPIROM DOC REV: CPT | Performed by: INTERNAL MEDICINE

## 2021-06-16 PROCEDURE — G8926 SPIRO NO PERF OR DOC: HCPCS | Performed by: INTERNAL MEDICINE

## 2021-06-16 PROCEDURE — 99214 OFFICE O/P EST MOD 30 MIN: CPT | Performed by: INTERNAL MEDICINE

## 2021-06-16 PROCEDURE — 1123F ACP DISCUSS/DSCN MKR DOCD: CPT | Performed by: INTERNAL MEDICINE

## 2021-06-16 PROCEDURE — 3017F COLORECTAL CA SCREEN DOC REV: CPT | Performed by: INTERNAL MEDICINE

## 2021-06-16 RX ORDER — PREDNISONE 10 MG/1
TABLET ORAL
Qty: 30 TABLET | Refills: 0 | Status: SHIPPED | OUTPATIENT
Start: 2021-06-16 | End: 2021-06-26

## 2021-06-16 RX ORDER — FLUTICASONE FUROATE, UMECLIDINIUM BROMIDE AND VILANTEROL TRIFENATATE 100; 62.5; 25 UG/1; UG/1; UG/1
1 POWDER RESPIRATORY (INHALATION) DAILY
Qty: 1 EACH | Refills: 5 | Status: SHIPPED | OUTPATIENT
Start: 2021-06-16 | End: 2022-01-11 | Stop reason: SDUPTHER

## 2021-06-16 RX ORDER — CEFDINIR 300 MG/1
300 CAPSULE ORAL 2 TIMES DAILY
Qty: 14 CAPSULE | Refills: 0 | Status: SHIPPED | OUTPATIENT
Start: 2021-06-16 | End: 2021-06-23

## 2021-06-16 NOTE — PROGRESS NOTES
[facility]  Progress Note  Cara Barbour MD           Angle Duty   YOB: 1950    Date of Visit:  6/16/2021    Assessment/Plan:  1. Shortness of breath  Worse  Has rattleing in his chest  Finished Abx and steroids and symptoms returned  CXR and [facility] at [facility] were negative  COVID negative and he is vaccinated    Medication Management:  The patient benefits from the medical regimen and reports no complications. 2. Chronic cough/Hemoptysis  Green sputum    3. COPD, severe ([address]  PFT 9/19:  Stable  INTERPRETATION:  Spirometry reveals decreased FVC at 2.88 liters which  is 62% predicted. FEV1 is decreased to 2.04 liters which is 59%  predicted. FEV1/FVC ratio is at the lower limits of normal at 71%. There is a 15% improvement in FEV1 after inhaled bronchodilators. Lung  volumes reveal increased total lung capacity of 119% predicted. Residual volume is increased at 175% predicted. Diffusion capacity is  normal.     IMPRESSION:  Moderate obstructive lung disease with hyperinflation. Medication Management:  The patient benefits from the medical regimen and reports no complications. Continue:  Anoro ==> Trelegy  Albuterol HFA/HHN  Daliresp ==> Resume this which he ran out of    Repeat PFT is worse than previous when FEV1 was 2.28 L    4. Cardiomyopathy ([address]  LVEF 30%  Followed by cardiology    5. Coronary artery disease involving native coronary artery of native heart with angina pectoris ([address]  [facility] 1/17    6. Essential hypertension, benign  BP is up today    He has had his flu shot.   He has had his COVID vaccine    FOLLOW UP: 6 months      Chief Complaint   Patient presents with    Shortness of Breath     ended up at [facility] last week Woke up and couldn't breathe Put on antibiotic and Medrol Dose pack Once he finished it sx's have returned Feels his inhalers are not strong enough    Wheezing    Discuss Medications     had problem getting his [employer] App without it for 3 months       HPI  The patient presents with a chief complaint of severe shortness of breath related to moderate to severe COPD of many years duration. He has mild associated cough. He was ion ER last week with SOB. He has some green sputum. He took a steroid taper and that helped but symptoms returned when the medication finished. He also had an Abx. He ran out of his Daliresp. Review of Systems  No Chest pain, Nausea or vomiting reported      Physical Exam:    Well developed, well nourished  Alert and oriented  Sclera is clear  No cervical adenopathy  No JVD. Chest examination is clear. Cardiac examination reveals regular rate and rhythm without murmur, gallop or rub. The abdomen is soft, nontender and nondistended. There is no clubbing, cyanosis or edema of the extremities. There is no obvious skin rash. No focal neuro deficicts  Normal mood and affect      Allergies   Allergen Reactions    Lisinopril Other (See Comments)     COUGH     Prior to Visit Medications    Medication Sig Taking?  Authorizing Provider   albuterol sulfate  (90 Base) MCG/ACT inhaler INHALE 2 PUFFS INTO THE LUNGS EVERY 4 HOURS AS NEEDED FOR WHEEZING  Venkata Muhammad MD   albuterol (PROVENTIL) (2.5 MG/3ML) 0.083% nebulizer solution USE 1 VIAL PER NEBULIZER EVERY SIX HOURS AS NEEDED FOR WHEEZING  Venkata Muhammad MD   losartan (COZAAR) 100 MG tablet TAKE 1 TABLET BY MOUTH DAILY  Nori Link MD   clopidogrel (PLAVIX) 75 MG tablet TAKE 1 TABLET BY MOUTH DAILY  Nori Link MD   aspirin-acetaminophen-caffeine (EXCEDRIN MIGRAINE) 516-671-68 MG per tablet Take 1 tablet by mouth every 6 hours as needed for Headaches  Evelyn Quiroz MD   DALIRESP 500 MCG tablet TAKE 1 TABLET BY MOUTH DAILY  Venkata Muhammad MD   spironolactone (ALDACTONE) 25 MG tablet TAKE 1 TABLET BY MOUTH DAILY  Nori Link MD   aspirin 81 MG EC tablet Take 81 mg by mouth daily  Historical Provider, MD   umeclidinium-vilanterol (ANORO ELLIPTA) 62.5-25 MCG/INH AEPB inhaler Inhale 1 puff into the lungs daily INHALE 1 PUFF INTO THE LUNGS DAILY  Tejinder Block MD   ferrous sulfate (IRON 325) 325 (65 Fe) MG tablet TAKE 1 TABLET BY MOUTH DAILY WITH BREAKFAST  Historical Provider, MD   FLUoxetine (PROZAC) 20 MG capsule TK 1 C PO D  Historical Provider, MD   metoprolol succinate (TOPROL XL) 50 MG extended release tablet TAKE 1 TABLET BY MOUTH DAILY  Yolanda Hickey MD   doxazosin (CARDURA) 4 MG tablet Take 1 tablet by mouth nightly  Patient taking differently: Take 4 mg by mouth nightly PT STATES HASN'T TAKEN IN A FEW DAYS. INSTRUCTED HIM TO GET IT REFILLED  ALEXEI Nolan CNP   nitroGLYCERIN (NITROSTAT) 0.4 MG SL tablet Place 1 tablet under the tongue every 5 minutes as needed for Chest pain  Yolanda Hickey MD   clonazePAM (KLONOPIN) 1 MG tablet Take 1 mg by mouth 2 times daily as needed for Anxiety  Historical Provider, MD   metFORMIN (GLUCOPHAGE) 1000 MG tablet Take 1,000 mg by mouth 2 times daily (with meals)   Historical Provider, MD   simvastatin (ZOCOR) 40 MG tablet Take 0.5 tablets by mouth nightly  ALEXEI Mcclellan CNP       Vitals:    06/16/21 1003   BP: 138/61   Pulse: 65   SpO2: 98%     There is no height or weight on file to calculate BMI.      Wt Readings from Last 3 Encounters:   04/11/21 265 lb (120.2 kg)   03/11/21 270 lb (122.5 kg)   12/16/20 284 lb (128.8 kg)     BP Readings from Last 3 Encounters:   06/16/21 138/61   04/14/21 (!) 157/91   04/14/21 100/62        Social History     Tobacco Use   Smoking Status Former Smoker    Packs/day: 0.25    Years: 25.00    Pack years: 6.25    Types: Cigarettes, Pipe, Cigars    Quit date: 1/1/2011    Years since quitting: 10.4   Smokeless Tobacco Former User   Tobacco Comment    per patient, has not smoked in 15 years

## 2021-06-30 ENCOUNTER — OFFICE VISIT (OUTPATIENT)
Dept: CARDIOLOGY CLINIC | Age: 71
End: 2021-06-30
Payer: MEDICARE

## 2021-06-30 VITALS
WEIGHT: 285.6 LBS | SYSTOLIC BLOOD PRESSURE: 124 MMHG | DIASTOLIC BLOOD PRESSURE: 94 MMHG | HEART RATE: 71 BPM | BODY MASS INDEX: 39.98 KG/M2 | OXYGEN SATURATION: 97 % | HEIGHT: 71 IN

## 2021-06-30 DIAGNOSIS — R00.2 PALPITATIONS: ICD-10-CM

## 2021-06-30 DIAGNOSIS — I25.119 CORONARY ARTERY DISEASE INVOLVING NATIVE CORONARY ARTERY OF NATIVE HEART WITH ANGINA PECTORIS (HCC): ICD-10-CM

## 2021-06-30 DIAGNOSIS — I10 ESSENTIAL HYPERTENSION, MALIGNANT: ICD-10-CM

## 2021-06-30 DIAGNOSIS — E78.2 MIXED HYPERLIPIDEMIA: Primary | ICD-10-CM

## 2021-06-30 PROCEDURE — 99214 OFFICE O/P EST MOD 30 MIN: CPT | Performed by: INTERNAL MEDICINE

## 2021-06-30 PROCEDURE — 4040F PNEUMOC VAC/ADMIN/RCVD: CPT | Performed by: INTERNAL MEDICINE

## 2021-06-30 PROCEDURE — 3017F COLORECTAL CA SCREEN DOC REV: CPT | Performed by: INTERNAL MEDICINE

## 2021-06-30 PROCEDURE — 93228 REMOTE 30 DAY ECG REV/REPORT: CPT | Performed by: INTERNAL MEDICINE

## 2021-06-30 PROCEDURE — G8427 DOCREV CUR MEDS BY ELIG CLIN: HCPCS | Performed by: INTERNAL MEDICINE

## 2021-06-30 PROCEDURE — 1036F TOBACCO NON-USER: CPT | Performed by: INTERNAL MEDICINE

## 2021-06-30 PROCEDURE — 1123F ACP DISCUSS/DSCN MKR DOCD: CPT | Performed by: INTERNAL MEDICINE

## 2021-06-30 PROCEDURE — G8417 CALC BMI ABV UP PARAM F/U: HCPCS | Performed by: INTERNAL MEDICINE

## 2021-06-30 NOTE — PROGRESS NOTES
[address]   Cardiac Evaluation      Patient: [facility]  YOB: 1950      Chief Complaint   Patient presents with    Coronary Artery Disease    6 Month Follow-Up        Referring provider: Clare Escobar MD    History of Present Illness:   Mr Edman Collet is seen today in follow up. He had been following with CNP, up until recently. Cardiac history includes CAD w/ cardiomyopathy, hyperlipidemia, and hypertension. He states he had a MI in 2017 with 2 stents at [facility] and is now on Plavix. Today, Mr. Edman Collet states he has heart fluttering that occur often. He states they last 2 minutes when he gets them. He is also unsteady on his feet and he states he has been falling. Linette Escalante has gained 20 lbs in 2 months. He states Dr Rosendo Sahu gave him an appetite suppresant but I do not see this on her list.  He was told to improve his diet. He is very sedentary. Past Medical History:   has a past medical history of COPD (chronic obstructive pulmonary disease) ([facility] [address] Coronary artery disease involving native coronary artery of native heart with angina pectoris ([address] Depression, Diabetes mellitus ([address] Dysphagia, Enlarged prostate, Heart enlarged, Hyperlipidemia, Hypertension, MRSA (methicillin resistant staph aureus) culture positive, Obesity, Pneumonia, and Sleep apnea. Surgical History:   has a past surgical history that includes Prostate surgery; Gallbladder surgery; Dental surgery; Coronary angioplasty (01/11/2017); Cholecystectomy; bronchoscopy (04/25/2018); cystoscopy w biopsy of bladder (N/A, 3/15/2021); TURP (N/A, 3/15/2021); and Cystoscopy (N/A, 4/14/2021).      Current Outpatient Medications   Medication Sig Dispense Refill    fluticasone-umeclidin-vilant (TRELEGY ELLIPTA) 100-62.5-25 MCG/INH AEPB Inhale 1 puff into the lungs daily 1 each 5    Roflumilast (DALIRESP) 500 MCG tablet TAKE 1 TABLET BY MOUTH DAILY 30 tablet 5    albuterol sulfate  (90 Base) MCG/ACT inhaler INHALE 2 PUFFS INTO THE LUNGS EVERY 4 HOURS AS NEEDED FOR WHEEZING 6.7 g 11    albuterol (PROVENTIL) (2.5 MG/3ML) 0.083% nebulizer solution USE 1 VIAL PER NEBULIZER EVERY SIX HOURS AS NEEDED FOR WHEEZING 360 mL 11    losartan (COZAAR) 100 MG tablet TAKE 1 TABLET BY MOUTH DAILY 90 tablet 3    clopidogrel (PLAVIX) 75 MG tablet TAKE 1 TABLET BY MOUTH DAILY 90 tablet 3    aspirin-acetaminophen-caffeine (EXCEDRIN MIGRAINE) 250-250-65 MG per tablet Take 1 tablet by mouth every 6 hours as needed for Headaches 90 tablet 3    spironolactone (ALDACTONE) 25 MG tablet TAKE 1 TABLET BY MOUTH DAILY 90 tablet 3    aspirin 81 MG EC tablet Take 81 mg by mouth daily      ferrous sulfate (IRON 325) 325 (65 Fe) MG tablet TAKE 1 TABLET BY MOUTH DAILY WITH BREAKFAST      FLUoxetine (PROZAC) 20 MG capsule TK 1 C PO D      metoprolol succinate (TOPROL XL) 50 MG extended release tablet TAKE 1 TABLET BY MOUTH DAILY 90 tablet 1    doxazosin (CARDURA) 4 MG tablet Take 1 tablet by mouth nightly (Patient taking differently: Take 4 mg by mouth nightly PT STATES HASN'T TAKEN IN A FEW DAYS. INSTRUCTED HIM TO GET IT REFILLED) 90 tablet 2    nitroGLYCERIN (NITROSTAT) 0.4 MG SL tablet Place 1 tablet under the tongue every 5 minutes as needed for Chest pain 25 tablet 1    clonazePAM (KLONOPIN) 1 MG tablet Take 1 mg by mouth 2 times daily as needed for Anxiety      metFORMIN (GLUCOPHAGE) 1000 MG tablet Take 1,000 mg by mouth 2 times daily (with meals)       simvastatin (ZOCOR) 40 MG tablet Take 0.5 tablets by mouth nightly 30 tablet 3     No current facility-administered medications for this visit.        Allergies:  Lisinopril     Social History:  Social History     Socioeconomic History    Marital status: Single     Spouse name: Not on file    Number of children: Not on file    Years of education: Not on file    Highest education level: Not on file   Occupational History    Not on file   Tobacco Use    Smoking status: Former Smoker Packs/day: 0.25     Years: 25.00     Pack years: 6.25     Types: Cigarettes, Pipe, Cigars     Quit date: 1/1/2011     Years since quitting: 10.5    Smokeless tobacco: Former User    Tobacco comment: per patient, has not smoked in 15 years   Vaping Use    Vaping Use: Never used   Substance and Sexual Activity    Alcohol use: Not Currently     Comment: per patient, has not drank in 15 years    Drug use: No    Sexual activity: Not on file   Other Topics Concern    Not on file   Social History Narrative    Not on file     Social Determinants of Health     Financial Resource Strain:     Difficulty of Paying Living Expenses:    Food Insecurity:     Worried About Running Out of Food in the Last Year:     [address] in the Last Year:    Transportation Needs:     Lack of Transportation (Medical):  Lack of Transportation (Non-Medical):    Physical Activity:     Days of Exercise per Week:     Minutes of Exercise per Session:    Stress:     Feeling of Stress :    Social Connections:     Frequency of Communication with Friends and Family:     Frequency of Social Gatherings with Friends and Family:     Attends Jewish Services:     Active Member of Clubs or Organizations:     Attends Club or Organization Meetings:     Marital Status:    Intimate Partner Violence:     Fear of Current or Ex-Partner:     Emotionally Abused:     Physically Abused:     Sexually Abused:        Family History:   Family History   Problem Relation Age of Onset    COPD Mother     Heart Disease Father     COPD Father      Family history has been reviewed and not pertinent except as noted above. Review of Systems:   · Constitutional: there has been no unanticipated weight loss. No change in energy or activity level   · Eyes: No visual changes   · ENT: No Headaches, hearing loss or vertigo. No mouth sores or sore throat.   · Cardiovascular: Reviewed in HPI  · Respiratory: No cough or wheezing, no sputum production. · Gastrointestinal: No abdominal pain, appetite loss, blood in stools. No change in bowel or bladder habits. · Genitourinary: No nocturia, dysuria, trouble voiding  · Musculoskeletal:  No gait disturbance, weakness or joint complaints. · Integumentary: No rash or pruritis. · Neurological: No headache, change in muscle strength, numbness or tingling. No change in gait, balance, coordination, mood, affect, memory, mentation, behavior. · Psychiatric: No anxiety or depression  · Endocrine: No malaise or fever  · Hematologic/Lymphatic: No abnormal bruising or bleeding, blood clots or swollen lymph nodes. · Allergic/Immunologic: No nasal congestion or hives. Physical Examination:    Vitals:    06/30/21 0934 06/30/21 0938   BP: (!) 124/90 (!) 124/94   Site: Left Upper Arm Left Upper Arm   Position: Sitting Sitting   Cuff Size: Medium Adult Medium Adult   Pulse: 71    SpO2: 97%    Weight: 285 lb 9.6 oz (129.5 kg)    Height: 5' 11\" (1.803 m)      Body mass index is 39.83 kg/m². Wt Readings from Last 3 Encounters:   06/30/21 285 lb 9.6 oz (129.5 kg)   04/11/21 265 lb (120.2 kg)   03/11/21 270 lb (122.5 kg)      BP Readings from Last 3 Encounters:   06/30/21 (!) 124/94   06/16/21 138/61   04/14/21 (!) 157/91        Physical Examination:    · CONSTITUTIONAL: Well developed, well nourished, obese, multiple tattoos  · EYES: PERRLA. No xanthelasma, sclera non icteric  · EARS,NOSE,MOUTH,THROAT:  Mucous membranes moist, normal hearing  · NECK: Supple, JVP normal, thyroid not enlarged. Carotids 2+ without bruits  · RESPIRATORY: Diminished bases  · CARDIOVASCULAR: Normal PMI, regular rate and rhythm, no murmurs, rub or gallop. Trace edema BLE. Radial pulses present and equal  · CHEST: No scar or masses  · ABDOMEN: Normal bowel sounds. No masses or tenderness. No bruit  · MUSCULOSKELETAL: No clubbing or cyanosis. Moves all extremities well. Normal gait  · SKIN:  Warm and dry.  No rashes  · NEUROLOGIC: Cranial nerves intact. Alert and oriented  · PSYCHIATRIC: Calm affect. Appears to have normal judgement and insight        Assessment/Plan  1. Coronary artery disease involving native coronary artery of native heart with angina pectoris ([address]   ~Cath 1/13/17: ([facility]) LM: MLI, PROX LAD: 100% occluded, MID LAD: 40-50%, DISTAL LAD: 95%, EF 30-35%. Single vessel coronary artery disease with a 100% proximal LAD and 95% distal LAD. Successful overlapping 4.0 x 38 and 4.0 x 16 synergy stents to the proximal LAD  Successful POBA of the distal LAD with a 2.0 balloon  ~Nuclear stress test (9/11/8):marked LV enlargement with anterior wall akinesis. EF 41%. Moderate sized anterior fixed defect of moderate intensity consistent with infarction in the territory of the mid and distal LAD.  ~On DAPT/BB/Statin  ~Stable   2. Essential hypertension, malignant - stable  ~On cardura, Toprol XL, Cozaar   3. Mixed hyperlipidemia   ~ Lipids (12/3/19): ; TRIG 85; HDL 30; LDL 54  ~stable on zocor 40   4. Ischemic cardiomyopathy   ~Echo 7/10/20: Left ventricular cavity size is mildly dilated. Mild concentric left ventricular hypertrophy. Overall, left ventricular systolic function appears mildly reduced with EF 45-50% range. Cannot exclude regional wall motion abnormalities secondary to poor endocardial visualization. Diastolic filling parameters suggest grade I diastolic dysfunction. Trivial mitral regurgitation. The right ventricle is normal in size and function. Unable to estimate pulmonary artery pressure secondary to incomplete/absent TR jet envelope  ~EF 41% on GXT 9/2018   5. Dizzy - not orthostatic on exam today with systolics of 616 lying, sitting and standing. , will wear a 30 day cardiac monitor      PLAN:  Will wear a 30 day monitor and call with results. Scribe's attestation: This note was scribed in the presence of Dr Shawn Hernandez MD by Starla Fung RN.     Thank you for allowing to me to participate in the care of Vickie Watkins Anola Amita.

## 2021-07-02 RX ORDER — METOPROLOL SUCCINATE 50 MG/1
50 TABLET, EXTENDED RELEASE ORAL DAILY
Qty: 90 TABLET | Refills: 3 | Status: SHIPPED | OUTPATIENT
Start: 2021-07-02 | End: 2022-07-14 | Stop reason: SDUPTHER

## 2021-08-10 RX ORDER — NITROGLYCERIN 0.4 MG/1
TABLET SUBLINGUAL
Qty: 25 TABLET | Refills: 1 | Status: SHIPPED | OUTPATIENT
Start: 2021-08-10 | End: 2022-04-27 | Stop reason: SDUPTHER

## 2022-01-11 ENCOUNTER — VIRTUAL VISIT (OUTPATIENT)
Dept: PULMONOLOGY | Age: 72
End: 2022-01-11
Payer: MEDICARE

## 2022-01-11 DIAGNOSIS — R05.3 CHRONIC COUGH: ICD-10-CM

## 2022-01-11 DIAGNOSIS — R06.02 SOB (SHORTNESS OF BREATH): Primary | ICD-10-CM

## 2022-01-11 DIAGNOSIS — I25.5 ISCHEMIC CARDIOMYOPATHY: ICD-10-CM

## 2022-01-11 DIAGNOSIS — J44.9 COPD, SEVERE (HCC): ICD-10-CM

## 2022-01-11 PROCEDURE — 1123F ACP DISCUSS/DSCN MKR DOCD: CPT | Performed by: INTERNAL MEDICINE

## 2022-01-11 PROCEDURE — 99214 OFFICE O/P EST MOD 30 MIN: CPT | Performed by: INTERNAL MEDICINE

## 2022-01-11 PROCEDURE — 3017F COLORECTAL CA SCREEN DOC REV: CPT | Performed by: INTERNAL MEDICINE

## 2022-01-11 PROCEDURE — G8427 DOCREV CUR MEDS BY ELIG CLIN: HCPCS | Performed by: INTERNAL MEDICINE

## 2022-01-11 PROCEDURE — 4040F PNEUMOC VAC/ADMIN/RCVD: CPT | Performed by: INTERNAL MEDICINE

## 2022-01-11 RX ORDER — ALBUTEROL SULFATE 90 UG/1
2 AEROSOL, METERED RESPIRATORY (INHALATION) EVERY 4 HOURS PRN
Qty: 6.7 G | Refills: 11 | Status: SHIPPED | OUTPATIENT
Start: 2022-01-11

## 2022-01-11 RX ORDER — PREDNISONE 10 MG/1
TABLET ORAL
Qty: 30 TABLET | Refills: 0 | Status: ON HOLD | OUTPATIENT
Start: 2022-01-11 | End: 2022-01-20 | Stop reason: SDUPTHER

## 2022-01-11 RX ORDER — DOXYCYCLINE HYCLATE 100 MG/1
100 CAPSULE ORAL 2 TIMES DAILY
Qty: 20 CAPSULE | Refills: 0 | Status: SHIPPED | OUTPATIENT
Start: 2022-01-11 | End: 2022-01-21

## 2022-01-11 RX ORDER — FLUTICASONE FUROATE, UMECLIDINIUM BROMIDE AND VILANTEROL TRIFENATATE 100; 62.5; 25 UG/1; UG/1; UG/1
1 POWDER RESPIRATORY (INHALATION) DAILY
Qty: 1 EACH | Refills: 5 | Status: SHIPPED | OUTPATIENT
Start: 2022-01-11

## 2022-01-11 NOTE — PROGRESS NOTES
[facility]  Progress Note  Yas Ho MD    Pulmonology Video Visit    Pursuant to the emergency declaration under the [facility] waiver authority and the Coronavirus Preparedness and Response Supplemental Appropriations Act this Video Visit was insisted, with patient's consent, to reduce the patient's risk of exposure to COVID-19 and provide continuity of care for an established patient. The patient was at home, while the provider was at the clinic. Services were provided through a synchronous discussion through a Video Visit to substitute for in-person clinic visit, and coded as such. Merly Townsend   YOB: 1950    Date of Visit:  1/11/2022    Assessment/Plan:  1. Shortness of breath  Worse  Has rattleing in his chest  \Starting a flare uyp related to bronvhiotis    Medication Management:  The patient benefits from the medical regimen and reports no complications. · Doxycycline 100mg, 10 days  · Prednisone taper -- 40mg x 3 days; 30 mg x 3 days; 20 mg x 3 days; 10 mg x 3 days      2. Chronic cough  Green sputum    3. COPD, severe ([address]  PFT 9/19:  Stable  INTERPRETATION:  Spirometry reveals decreased FVC at 2.88 liters which  is 62% predicted. FEV1 is decreased to 2.04 liters which is 59%  predicted. FEV1/FVC ratio is at the lower limits of normal at 71%. There is a 15% improvement in FEV1 after inhaled bronchodilators. Lung  volumes reveal increased total lung capacity of 119% predicted. Residual volume is increased at 175% predicted. Diffusion capacity is  normal.     IMPRESSION:  Moderate obstructive lung disease with hyperinflation. Medication Management:  The patient benefits from the medical regimen and reports no complications.     Continue:  Anoro ==> Trelegy  Albuterol HFA/HHN  Daliresp ==> Resume this which he ran out of    Repeat PFT is worse than previous when FEV1 was 2.28 L    4. Cardiomyopathy ([facility] [address]  LVEF 30%  Followed by cardiology    5. Coronary artery disease involving native coronary artery of native heart with angina pectoris ([facility] [address]  Two stents at [facility] 1/17    He has had his flu shot. He has had his COVID vaccine    FOLLOW UP: 6 months      Chief Complaint   Patient presents with    Shortness of Breath     has cough sneezing, head stopped up Sx's started about a week ago. HPI  The patient presents with a chief complaint of severe shortness of breath related to moderate to severe COPD of many years duration. He has mild associated cough. He was ion ER last week with SOB. He has some green sputum. He took a steroid taper and that helped but symptoms returned when the medication finished. He also had an Abx. He ran out of his Daliresp. He is complaining of congestion, wheezing and cough. He is more short of breath. This has been going on for about one week. Review of Systems  No Chest pain, Nausea or vomiting reported      Physical Exam:    Video visit      Allergies   Allergen Reactions    Lisinopril Other (See Comments)     COUGH     Prior to Visit Medications    Medication Sig Taking?  Authorizing Provider   nitroGLYCERIN (NITROSTAT) 0.4 MG SL tablet DISSOLVE 1 TABLET UNDER THE TONGUE EVERY 5 MINUTES AS NEEDED FOR CHEST PAIN  Jovi Quiroga MD   ANORO ELLIPTA 62.5-25 MCG/INH AEPB inhaler INHALE 1 PUFF INTO THE LUNGS DAILY  Antonio Ding MD   metoprolol succinate (TOPROL XL) 50 MG extended release tablet TAKE 1 TABLET BY MOUTH DAILY  Jovi Quiroga MD   fluticasone-umeclidin-vilant (TRELEGY ELLIPTA) 100-62.5-25 MCG/INH AEPB Inhale 1 puff into the lungs daily  Antonio Ding MD   Roflumilast (DALIRESP) 500 MCG tablet TAKE 1 TABLET BY MOUTH DAILY  Antonio Ding MD   albuterol sulfate  (90 Base) MCG/ACT inhaler INHALE 2 PUFFS INTO THE LUNGS EVERY 4 HOURS AS NEEDED FOR WHEEZING  Antonio Ding MD   albuterol (PROVENTIL) (2.5 MG/3ML) 0.083% nebulizer solution USE 1 VIAL PER NEBULIZER EVERY SIX HOURS AS NEEDED FOR WHEEZING  Dixon Gagnon MD   losartan (COZAAR) 100 MG tablet TAKE 1 TABLET BY MOUTH DAILY  Roderic Cowden, MD   clopidogrel (PLAVIX) 75 MG tablet TAKE 1 TABLET BY MOUTH DAILY  Roderic Cowden, MD   aspirin-acetaminophen-caffeine (EXCEDRIN MIGRAINE) 319-846-08 MG per tablet Take 1 tablet by mouth every 6 hours as needed for Headaches  Rashad Rogel MD   spironolactone (ALDACTONE) 25 MG tablet TAKE 1 TABLET BY MOUTH DAILY  Roderic Cowden, MD   aspirin 81 MG EC tablet Take 81 mg by mouth daily  Historical Provider, MD   ferrous sulfate (IRON 325) 325 (65 Fe) MG tablet TAKE 1 TABLET BY MOUTH DAILY WITH BREAKFAST  Historical Provider, MD   FLUoxetine (PROZAC) 20 MG capsule TK 1 C PO D  Historical Provider, MD   doxazosin (CARDURA) 4 MG tablet Take 1 tablet by mouth nightly  Patient taking differently: Take 4 mg by mouth nightly PT STATES HASN'T TAKEN IN A FEW DAYS. INSTRUCTED HIM TO GET IT REFILLED  ALEXEI Woody CNP   clonazePAM (KLONOPIN) 1 MG tablet Take 1 mg by mouth 2 times daily as needed for Anxiety  Historical Provider, MD   metFORMIN (GLUCOPHAGE) 1000 MG tablet Take 1,000 mg by mouth 2 times daily (with meals)   Historical Provider, MD   simvastatin (ZOCOR) 40 MG tablet Take 0.5 tablets by mouth nightly  ALEXEI Santillan CNP       There were no vitals filed for this visit. There is no height or weight on file to calculate BMI.      Wt Readings from Last 3 Encounters:   21 285 lb 9.6 oz (129.5 kg)   21 265 lb (120.2 kg)   21 270 lb (122.5 kg)     BP Readings from Last 3 Encounters:   21 (!) 124/94   21 138/61   21 (!) 157/91        Social History     Tobacco Use   Smoking Status Former Smoker    Packs/day: 0.25    Years: 25.00    Pack years: 6.25    Types: Cigarettes, Pipe, Cigars    Quit date: 2011    Years since quittin.0   Smokeless Tobacco Former User   Tobacco Comment    per patient, has not smoked in 15 years

## 2022-01-17 ENCOUNTER — APPOINTMENT (OUTPATIENT)
Dept: GENERAL RADIOLOGY | Age: 72
DRG: 178 | End: 2022-01-17
Payer: MEDICARE

## 2022-01-17 ENCOUNTER — HOSPITAL ENCOUNTER (INPATIENT)
Age: 72
LOS: 3 days | Discharge: HOME HEALTH | DRG: 178 | End: 2022-01-20
Attending: INTERNAL MEDICINE | Admitting: INTERNAL MEDICINE
Payer: MEDICARE

## 2022-01-17 ENCOUNTER — APPOINTMENT (OUTPATIENT)
Dept: CT IMAGING | Age: 72
DRG: 178 | End: 2022-01-17
Payer: MEDICARE

## 2022-01-17 DIAGNOSIS — R09.02 HYPOXIA: ICD-10-CM

## 2022-01-17 DIAGNOSIS — Z20.822 SUSPECTED COVID-19 VIRUS INFECTION: Primary | ICD-10-CM

## 2022-01-17 PROBLEM — J44.1 COPD WITH ACUTE EXACERBATION (HCC): Status: ACTIVE | Noted: 2022-01-17

## 2022-01-17 LAB
A/G RATIO: 0.9 (ref 1.1–2.2)
ALBUMIN SERPL-MCNC: 3.2 G/DL (ref 3.4–5)
ALP BLD-CCNC: 91 U/L (ref 40–129)
ALT SERPL-CCNC: 21 U/L (ref 10–40)
ANION GAP SERPL CALCULATED.3IONS-SCNC: 15 MMOL/L (ref 3–16)
APTT: 28.9 SEC (ref 26.2–38.6)
AST SERPL-CCNC: 29 U/L (ref 15–37)
BASE EXCESS VENOUS: -1.7 MMOL/L (ref -3–3)
BASOPHILS ABSOLUTE: 0.1 K/UL (ref 0–0.2)
BASOPHILS RELATIVE PERCENT: 0.6 %
BILIRUB SERPL-MCNC: 0.8 MG/DL (ref 0–1)
BILIRUBIN URINE: NEGATIVE
BLOOD, URINE: ABNORMAL
BUN BLDV-MCNC: 23 MG/DL (ref 7–20)
CALCIUM SERPL-MCNC: 9 MG/DL (ref 8.3–10.6)
CARBOXYHEMOGLOBIN: 4.6 % (ref 0–1.5)
CHLORIDE BLD-SCNC: 99 MMOL/L (ref 99–110)
CLARITY: CLEAR
CO2: 19 MMOL/L (ref 21–32)
COLOR: YELLOW
CREAT SERPL-MCNC: 0.8 MG/DL (ref 0.8–1.3)
D DIMER: 518 NG/ML DDU (ref 0–229)
EKG ATRIAL RATE: 73 BPM
EKG DIAGNOSIS: NORMAL
EKG P AXIS: -7 DEGREES
EKG P-R INTERVAL: 146 MS
EKG Q-T INTERVAL: 388 MS
EKG QRS DURATION: 78 MS
EKG QTC CALCULATION (BAZETT): 427 MS
EKG R AXIS: -26 DEGREES
EKG T AXIS: 4 DEGREES
EKG VENTRICULAR RATE: 73 BPM
EOSINOPHILS ABSOLUTE: 0.1 K/UL (ref 0–0.6)
EOSINOPHILS RELATIVE PERCENT: 0.6 %
EPITHELIAL CELLS, UA: 1 /HPF (ref 0–5)
GFR AFRICAN AMERICAN: >60
GFR NON-AFRICAN AMERICAN: >60
GLUCOSE BLD-MCNC: 130 MG/DL (ref 70–99)
GLUCOSE URINE: NEGATIVE MG/DL
HCO3 VENOUS: 21.3 MMOL/L (ref 23–29)
HCT VFR BLD CALC: 40.2 % (ref 40.5–52.5)
HEMOGLOBIN: 13.1 G/DL (ref 13.5–17.5)
HYALINE CASTS: 2 /LPF (ref 0–8)
INR BLD: 1.36 (ref 0.88–1.12)
KETONES, URINE: NEGATIVE MG/DL
LACTIC ACID: 1.6 MMOL/L (ref 0.4–2)
LEUKOCYTE ESTERASE, URINE: NEGATIVE
LYMPHOCYTES ABSOLUTE: 0.4 K/UL (ref 1–5.1)
LYMPHOCYTES RELATIVE PERCENT: 4.2 %
MCH RBC QN AUTO: 23.1 PG (ref 26–34)
MCHC RBC AUTO-ENTMCNC: 32.5 G/DL (ref 31–36)
MCV RBC AUTO: 71.1 FL (ref 80–100)
METHEMOGLOBIN VENOUS: <0 %
MICROSCOPIC EXAMINATION: YES
MONOCYTES ABSOLUTE: 0.5 K/UL (ref 0–1.3)
MONOCYTES RELATIVE PERCENT: 5.5 %
NEUTROPHILS ABSOLUTE: 8.7 K/UL (ref 1.7–7.7)
NEUTROPHILS RELATIVE PERCENT: 89.1 %
NITRITE, URINE: NEGATIVE
O2 CONTENT, VEN: 19 VOL %
O2 SAT, VEN: 100 %
O2 THERAPY: ABNORMAL
PCO2, VEN: 30.6 MMHG (ref 40–50)
PDW BLD-RTO: 17.2 % (ref 12.4–15.4)
PH UA: 5.5 (ref 5–8)
PH VENOUS: 7.45 (ref 7.35–7.45)
PLATELET # BLD: 171 K/UL (ref 135–450)
PMV BLD AUTO: 7.7 FL (ref 5–10.5)
PO2, VEN: 163 MMHG (ref 25–40)
POTASSIUM SERPL-SCNC: 3.8 MMOL/L (ref 3.5–5.1)
PRO-BNP: 677 PG/ML (ref 0–124)
PROCALCITONIN: 0.11 NG/ML (ref 0–0.15)
PROTEIN UA: 100 MG/DL
PROTHROMBIN TIME: 15.6 SEC (ref 9.9–12.7)
RBC # BLD: 5.66 M/UL (ref 4.2–5.9)
RBC UA: 3 /HPF (ref 0–4)
SODIUM BLD-SCNC: 133 MMOL/L (ref 136–145)
SPECIFIC GRAVITY UA: >1.03 (ref 1–1.03)
TCO2 CALC VENOUS: 50 MMOL/L
TOTAL PROTEIN: 6.7 G/DL (ref 6.4–8.2)
TROPONIN: <0.01 NG/ML
URINE REFLEX TO CULTURE: ABNORMAL
URINE TYPE: ABNORMAL
UROBILINOGEN, URINE: 1 E.U./DL
WBC # BLD: 9.8 K/UL (ref 4–11)
WBC UA: 2 /HPF (ref 0–5)

## 2022-01-17 PROCEDURE — 82803 BLOOD GASES ANY COMBINATION: CPT

## 2022-01-17 PROCEDURE — 81001 URINALYSIS AUTO W/SCOPE: CPT

## 2022-01-17 PROCEDURE — 71045 X-RAY EXAM CHEST 1 VIEW: CPT

## 2022-01-17 PROCEDURE — 87040 BLOOD CULTURE FOR BACTERIA: CPT

## 2022-01-17 PROCEDURE — 85610 PROTHROMBIN TIME: CPT

## 2022-01-17 PROCEDURE — 94640 AIRWAY INHALATION TREATMENT: CPT

## 2022-01-17 PROCEDURE — 99285 EMERGENCY DEPT VISIT HI MDM: CPT

## 2022-01-17 PROCEDURE — U0005 INFEC AGEN DETEC AMPLI PROBE: HCPCS

## 2022-01-17 PROCEDURE — 80053 COMPREHEN METABOLIC PANEL: CPT

## 2022-01-17 PROCEDURE — 6370000000 HC RX 637 (ALT 250 FOR IP): Performed by: PHYSICIAN ASSISTANT

## 2022-01-17 PROCEDURE — 93010 ELECTROCARDIOGRAM REPORT: CPT | Performed by: INTERNAL MEDICINE

## 2022-01-17 PROCEDURE — 83880 ASSAY OF NATRIURETIC PEPTIDE: CPT

## 2022-01-17 PROCEDURE — 85730 THROMBOPLASTIN TIME PARTIAL: CPT

## 2022-01-17 PROCEDURE — 6360000004 HC RX CONTRAST MEDICATION: Performed by: PHYSICIAN ASSISTANT

## 2022-01-17 PROCEDURE — 2060000000 HC ICU INTERMEDIATE R&B

## 2022-01-17 PROCEDURE — 84145 PROCALCITONIN (PCT): CPT

## 2022-01-17 PROCEDURE — 85379 FIBRIN DEGRADATION QUANT: CPT

## 2022-01-17 PROCEDURE — 2700000000 HC OXYGEN THERAPY PER DAY

## 2022-01-17 PROCEDURE — 83605 ASSAY OF LACTIC ACID: CPT

## 2022-01-17 PROCEDURE — 84484 ASSAY OF TROPONIN QUANT: CPT

## 2022-01-17 PROCEDURE — 71260 CT THORAX DX C+: CPT

## 2022-01-17 PROCEDURE — U0003 INFECTIOUS AGENT DETECTION BY NUCLEIC ACID (DNA OR RNA); SEVERE ACUTE RESPIRATORY SYNDROME CORONAVIRUS 2 (SARS-COV-2) (CORONAVIRUS DISEASE [COVID-19]), AMPLIFIED PROBE TECHNIQUE, MAKING USE OF HIGH THROUGHPUT TECHNOLOGIES AS DESCRIBED BY CMS-2020-01-R: HCPCS

## 2022-01-17 PROCEDURE — 93005 ELECTROCARDIOGRAM TRACING: CPT | Performed by: EMERGENCY MEDICINE

## 2022-01-17 PROCEDURE — 96374 THER/PROPH/DIAG INJ IV PUSH: CPT

## 2022-01-17 PROCEDURE — 6360000002 HC RX W HCPCS: Performed by: PHYSICIAN ASSISTANT

## 2022-01-17 PROCEDURE — 85025 COMPLETE CBC W/AUTO DIFF WBC: CPT

## 2022-01-17 RX ORDER — DEXAMETHASONE SODIUM PHOSPHATE 10 MG/ML
6 INJECTION, SOLUTION INTRAMUSCULAR; INTRAVENOUS ONCE
Status: COMPLETED | OUTPATIENT
Start: 2022-01-17 | End: 2022-01-17

## 2022-01-17 RX ORDER — IPRATROPIUM BROMIDE AND ALBUTEROL SULFATE 2.5; .5 MG/3ML; MG/3ML
1 SOLUTION RESPIRATORY (INHALATION) ONCE
Status: COMPLETED | OUTPATIENT
Start: 2022-01-17 | End: 2022-01-17

## 2022-01-17 RX ADMIN — IOPAMIDOL 75 ML: 755 INJECTION, SOLUTION INTRAVENOUS at 16:33

## 2022-01-17 RX ADMIN — DEXAMETHASONE SODIUM PHOSPHATE 6 MG: 10 INJECTION, SOLUTION INTRAMUSCULAR; INTRAVENOUS at 17:17

## 2022-01-17 RX ADMIN — IPRATROPIUM BROMIDE AND ALBUTEROL SULFATE 1 AMPULE: .5; 3 SOLUTION RESPIRATORY (INHALATION) at 15:49

## 2022-01-17 ASSESSMENT — ENCOUNTER SYMPTOMS
ABDOMINAL PAIN: 0
SORE THROAT: 0
WHEEZING: 1
COUGH: 1
DIARRHEA: 0
BACK PAIN: 0
TROUBLE SWALLOWING: 0
NAUSEA: 0
STRIDOR: 0
SHORTNESS OF BREATH: 1
CONSTIPATION: 0
VOMITING: 0
VOICE CHANGE: 0
COLOR CHANGE: 0

## 2022-01-17 NOTE — ED PROVIDER NOTES
EKG NOTE:    Sinus rhythm at a rate of 73 beats a minute with no acute ST elevations or depressions or pathologic Q waves. I was not involved with the care of this patient.        Brittany Carpenter MD  01/17/22 [id number]

## 2022-01-17 NOTE — ED PROVIDER NOTES
[address] [facility]        Pt Name: Lindsay Schafer  MRN: [id number]  [place] 1950  Date of evaluation: 1/17/2022  Provider: Oscar Lambert PA-C  PCP: Emily Smith MD  Note Started: 3:08 PM EST       AMARILYS. I have evaluated this patient. My supervising physician was available for consultation. CHIEF COMPLAINT       Chief Complaint   Patient presents with    Shortness of Breath     shortness of breath x two weeks. saw pcp, got prednisone and an anitbiodic with no improvement        HISTORY OF PRESENT ILLNESS   (Location, Timing/Onset, Context/Setting, Quality, Duration, Modifying Factors, Severity, Associated Signs and Symptoms)  Note limiting factors. Chief Complaint: Shortness of breath    Lindsay Schafer is a 71 y.o. male who presents to the emergency department with complaints of cough, congestion and shortness of breath for 2 weeks. Last week, saw PCP and got prescription for prednisone and doxycycline. He is still taking doxycycline but has completed prednisone. He is not feeling any better. Denies chest pain, palpitations, hemoptysis, abdominal pain, pain or swelling in extremities. He does feel very fatigued. He is hypoxic on arrival.  Does not wear supplemental oxygen at home. Nursing Notes were all reviewed and agreed with or any disagreements were addressed in the HPI. REVIEW OF SYSTEMS    (2-9 systems for level 4, 10 or more for level 5)     Review of Systems   Constitutional: Positive for fatigue. Negative for chills and fever. HENT: Positive for congestion. Negative for sore throat, tinnitus, trouble swallowing and voice change. Respiratory: Positive for cough, shortness of breath and wheezing. Negative for stridor. Cardiovascular: Negative for chest pain, palpitations and leg swelling. Gastrointestinal: Negative for abdominal pain, constipation, diarrhea, nausea and vomiting. Musculoskeletal: Negative for back pain, neck pain and neck stiffness. Skin: Negative for color change, pallor, rash and wound. Neurological: Negative for dizziness, tremors, seizures, syncope, facial asymmetry, speech difficulty, weakness, light-headedness, numbness and headaches. Psychiatric/Behavioral: Negative for confusion. All other systems reviewed and are negative. Positives and Pertinent negatives as per HPI. Except as noted above in the ROS, all other systems were reviewed and negative.        PAST MEDICAL HISTORY     Past Medical History:   Diagnosis Date    COPD (chronic obstructive pulmonary disease) ([facility] [place] [address]     Coronary artery disease involving native coronary artery of native heart with angina pectoris ([facility] [place] [address] 1/18/2017    Depression     Diabetes mellitus (HCC)     type II    Dysphagia     Enlarged prostate     Heart enlarged     Hyperlipidemia     Hypertension     MRSA (methicillin resistant staph aureus) culture positive 08/17/2018    sputum    Obesity     Pneumonia     multiple times 2018    Sleep apnea     has cpap but hasn't used \"in a long time\"         SURGICAL HISTORY     Past Surgical History:   Procedure Laterality Date    BRONCHOSCOPY  04/25/2018    CHOLECYSTECTOMY      CORONARY ANGIOPLASTY  01/11/2017    CYSTOSCOPY N/A 4/14/2021    CYSTOSCOPY FULGARATION AND EVACUATION OF CLOTS performed by Chelsea Schumacher MD at [facility] N/A 3/15/2021    RIGID CYSTOSCOPY WITH RESECTION OF BLADDER TUMOR performed by Elizabeth Wellington MD at [address]      teeth removed    GALLBLADDER SURGERY      gall stones    PROSTATE SURGERY      TURP N/A 3/15/2021    WITH TRANSURETHRAL RESECTION OF PROSTATE performed by Elizabeth Wellington MD at [address]       Previous Medications    ALBUTEROL (PROVENTIL) (2.5 MG/3ML) 0.083% NEBULIZER SOLUTION    USE 1 VIAL PER NEBULIZER EVERY SIX HOURS AS NEEDED FOR WHEEZING ALBUTEROL SULFATE  (90 BASE) MCG/ACT INHALER    Inhale 2 puffs into the lungs every 4 hours as needed for Wheezing    ASPIRIN 81 MG EC TABLET    Take 81 mg by mouth daily    ASPIRIN-ACETAMINOPHEN-CAFFEINE (EXCEDRIN MIGRAINE) 250-250-65 MG PER TABLET    Take 1 tablet by mouth every 6 hours as needed for Headaches    CLONAZEPAM (KLONOPIN) 1 MG TABLET    Take 1 mg by mouth 2 times daily as needed for Anxiety    CLOPIDOGREL (PLAVIX) 75 MG TABLET    TAKE 1 TABLET BY MOUTH DAILY    DOXAZOSIN (CARDURA) 4 MG TABLET    Take 1 tablet by mouth nightly    DOXYCYCLINE HYCLATE (VIBRAMYCIN) 100 MG CAPSULE    Take 1 capsule by mouth 2 times daily for 10 days    FERROUS SULFATE (IRON 325) 325 (65 FE) MG TABLET    TAKE 1 TABLET BY MOUTH DAILY WITH BREAKFAST    FLUOXETINE (PROZAC) 20 MG CAPSULE    TK 1 C PO D    FLUTICASONE-UMECLIDIN-VILANT (TRELEGY ELLIPTA) 100-62.5-25 MCG/INH AEPB    Inhale 1 puff into the lungs daily    LOSARTAN (COZAAR) 100 MG TABLET    TAKE 1 TABLET BY MOUTH DAILY    METFORMIN (GLUCOPHAGE) 1000 MG TABLET    Take 1,000 mg by mouth 2 times daily (with meals)     METOPROLOL SUCCINATE (TOPROL XL) 50 MG EXTENDED RELEASE TABLET    TAKE 1 TABLET BY MOUTH DAILY    NITROGLYCERIN (NITROSTAT) 0.4 MG SL TABLET    DISSOLVE 1 TABLET UNDER THE TONGUE EVERY 5 MINUTES AS NEEDED FOR CHEST PAIN    PREDNISONE (DELTASONE) 10 MG TABLET    Take 40 mg by mouth for 3 days 30 mg for 3 days 20 mg for 3 days 10 mg for 3 days.     ROFLUMILAST (DALIRESP) 500 MCG TABLET    TAKE 1 TABLET BY MOUTH DAILY    SIMVASTATIN (ZOCOR) 40 MG TABLET    Take 0.5 tablets by mouth nightly    SPIRONOLACTONE (ALDACTONE) 25 MG TABLET    TAKE 1 TABLET BY MOUTH DAILY         ALLERGIES     Lisinopril    FAMILYHISTORY       Family History   Problem Relation Age of Onset    COPD Mother     Heart Disease Father     COPD Father           SOCIAL HISTORY       Social History     Tobacco Use    Smoking status: Former Smoker     Packs/day: 0.25     Years: 25.00 Pack years: 6.25     Types: Cigarettes, Pipe, Cigars     Quit date: 2011     Years since quittin.0    Smokeless tobacco: Former User    Tobacco comment: per patient, has not smoked in 15 years   Vaping Use    Vaping Use: Never used   Substance Use Topics    Alcohol use: Not Currently     Comment: per patient, has not drank in 15 years    Drug use: No       SCREENINGS             PHYSICAL EXAM    (up to 7 for level 4, 8 or more for level 5)     ED Triage Vitals [22 1459]   BP Temp Temp Source Pulse Resp SpO2 Height Weight   (!) 157/83 98.4 °F (36.9 °C) Oral 73 18 (!) 86 % -- 276 lb (125.2 kg)       Physical Exam  Vitals and nursing note reviewed. Constitutional:       Appearance: He is well-developed. He is obese. He is not toxic-appearing or diaphoretic. HENT:      Head: Normocephalic and atraumatic. Right Ear: External ear normal.      Left Ear: External ear normal.      Nose: Congestion present. Mouth/Throat:      Mouth: Mucous membranes are moist.      Pharynx: Oropharynx is clear. Eyes:      General: No scleral icterus. Right eye: No discharge. Left eye: No discharge. Conjunctiva/sclera: Conjunctivae normal.      Pupils: Pupils are equal, round, and reactive to light. Cardiovascular:      Rate and Rhythm: Normal rate. Pulmonary:      Effort: Pulmonary effort is normal.      Breath sounds: Wheezing present. Abdominal:      General: Bowel sounds are normal.      Palpations: Abdomen is soft. Tenderness: There is no abdominal tenderness. There is no right CVA tenderness or left CVA tenderness. Musculoskeletal:         General: Normal range of motion. Cervical back: Normal range of motion. Comments: Trace symmetrical pretibial edema. No posterior calf or thigh tenderness, palpable cord, discoloration. Negative homans. Skin:     General: Skin is warm and dry. Capillary Refill: Capillary refill takes less than 2 seconds. Coloration: Skin is not jaundiced or pale. Findings: No bruising, erythema, lesion or rash. Neurological:      General: No focal deficit present. Mental Status: He is alert and oriented to person, place, and time.    Psychiatric:         Behavior: Behavior normal.         DIAGNOSTIC RESULTS   LABS:    Labs Reviewed   BLOOD GAS, VENOUS - Abnormal; Notable for the following components:       Result Value    pH, Rojelio 7.451 (*)     pCO2, Rojelio 30.6 (*)     pO2, Rojelio 163.0 (*)     HCO3, Venous 21.3 (*)     Carboxyhemoglobin 4.6 (*)     All other components within normal limits    Narrative:     Performed at:  [facility] [address] [address] [place]   Phone [phone]   CBC WITH AUTO DIFFERENTIAL - Abnormal; Notable for the following components:    Hemoglobin 13.1 (*)     Hematocrit 40.2 (*)     MCV 71.1 (*)     MCH 23.1 (*)     RDW 17.2 (*)     Neutrophils Absolute 8.7 (*)     Lymphocytes Absolute 0.4 (*)     All other components within normal limits    Narrative:     Performed at:  [facility] [address], [place], [address] [place]   Phone [phone]   COMPREHENSIVE METABOLIC PANEL - Abnormal; Notable for the following components:    Sodium 133 (*)     CO2 19 (*)     Glucose 130 (*)     BUN 23 (*)     Albumin 3.2 (*)     Albumin/Globulin Ratio 0.9 (*)     All other components within normal limits    Narrative:     Performed at:  [facility] [address] [address] [place]   Phone [phone]   PROTIME-INR - Abnormal; Notable for the following components:    Protime 15.6 (*)     INR 1.36 (*)     All other components within normal limits    Narrative:     Performed at:  [facility] [address] [facility] [place]   Phone [phone]   BRAIN NATRIURETIC PEPTIDE - Abnormal; Notable for the following components:    Pro- (*)     All other components within normal limits    Narrative:     Performed at:  [facility] [address]  [place], [address]   Phone [phone]   D-DIMER, QUANTITATIVE - Abnormal; Notable for the following components:    D-Dimer, Quant 518 (*)     All other components within normal limits    Narrative:     Performed at:  [facility] [address],  [place], [address]   Phone [phone]   CULTURE, BLOOD 2   CULTURE, BLOOD 1   PROCALCITONIN    Narrative:     Performed at:  [facility] [address],  [place], [address]   Phone [phone]   LACTIC ACID, PLASMA    Narrative:     Performed at:  [facility] [address],  [place], [address]   Phone [phone]   TROPONIN    Narrative:     Performed at:  [facility] [address]  [place], [address] [email or web address]   Phone [phone]   APTT    Narrative:     Performed at:  [facility] [address],  [place], [address]   Phone [phone]   LACTIC ACID, PLASMA   URINE RT REFLEX TO CULTURE   COVID-19       When ordered only abnormal lab results are displayed. All other labs were within normal range or not returned as of this dictation. EKG: When ordered, EKG's are interpreted by the Emergency Department Physician in the absence of a cardiologist.  Please see their note for interpretation of EKG. RADIOLOGY:   Non-plain film images such as CT, Ultrasound and MRI are read by the radiologist. Plain radiographic images are visualized and preliminarily interpreted by the ED Provider with the below findings:        Interpretation per the Radiologist below, if available at the time of this note:    CT CHEST PULMONARY EMBOLISM W CONTRAST   Final Result   1. Diffuse severe ground-glass infiltrates in the lungs. 2. No central or significant acute pulmonary embolism. Distal pulmonary   arteries the poorly visualized and distal tiny embolism difficult to exclude. 3. Mild cardiomegaly. RECOMMENDATIONS:   Unavailable         XR CHEST PORTABLE   Final Result   Mild indistinctness of the pulmonary vasculature is likely due to technique,   early edema could have a similar appearance. Further evaluation with upright   PA and lateral views in full inspiration may be helpful for better   characterization. Ari Dexter PROCEDURES   Unless otherwise noted below, none     Procedures    CRITICAL CARE TIME   Critical Care  There was a high probability of life-threatening clinical deterioration in the patient's condition requiring my urgent intervention. Total critical care time with the patient was 40  minutes excluding separately reportable procedures. Critical care required due to patients suspected covid19 infection with hypoxia prompting medical management, consultation and admission. CONSULTS:  Jono Hamlin will admit    EMERGENCY DEPARTMENT COURSE and DIFFERENTIAL DIAGNOSIS/MDM:   Vitals:    Vitals:    01/17/22 1459 01/17/22 1542 01/17/22 1557   BP: (!) 157/83 125/74 130/83   Pulse: 73  70   Resp: 18  22   Temp: 98.4 °F (36.9 °C)     TempSrc: Oral     SpO2: (!) 86% 94% 91%   Weight: 276 lb (125.2 kg)         Patient was given the following medications:  Medications   dexamethasone (PF) (DECADRON) injection 6 mg (has no administration in time range)   ipratropium-albuterol (DUONEB) nebulizer solution 1 ampule (1 ampule Inhalation Given 1/17/22 1549)   iopamidol (ISOVUE-370) 76 % injection 75 mL (75 mLs IntraVENous Given 1/17/22 1633)         This patient presents complaining of cough, congestion and shortness of breath. He is hypoxic on arrival.  CT scan of the chest does not show any evidence of PE but does show severe groundglass opacities, increasing my suspicion for COVID-19.   Patient understands and agrees with plan for admission for further treatment. FINAL IMPRESSION      1. Suspected COVID-19 virus infection    2. Hypoxia          DISPOSITION/PLAN   DISPOSITION Decision To Admit 01/17/2022 05:01:42 PM      PATIENT REFERRED TO:  No follow-up provider specified.     DISCHARGE MEDICATIONS:  New Prescriptions    No medications on file       DISCONTINUED MEDICATIONS:  Discontinued Medications    No medications on file              (Please note that portions of this note were completed with a voice recognition program.  Efforts were made to edit the dictations but occasionally words are mis-transcribed.)    Kaitlynn Youssef PA-C (electronically signed)           Kaitlynn Youssef PA-C  01/17/22 7787

## 2022-01-17 NOTE — ED NOTES
Bed: 22  Expected date:   Expected time:   Means of arrival:   Comments:  Greer Cee RN  01/17/22 9883

## 2022-01-18 LAB
ANION GAP SERPL CALCULATED.3IONS-SCNC: 16 MMOL/L (ref 3–16)
BUN BLDV-MCNC: 30 MG/DL (ref 7–20)
CALCIUM SERPL-MCNC: 9.3 MG/DL (ref 8.3–10.6)
CHLORIDE BLD-SCNC: 101 MMOL/L (ref 99–110)
CO2: 19 MMOL/L (ref 21–32)
CREAT SERPL-MCNC: 0.9 MG/DL (ref 0.8–1.3)
GFR AFRICAN AMERICAN: >60
GFR NON-AFRICAN AMERICAN: >60
GLUCOSE BLD-MCNC: 174 MG/DL (ref 70–99)
GLUCOSE BLD-MCNC: 194 MG/DL (ref 70–99)
GLUCOSE BLD-MCNC: 224 MG/DL (ref 70–99)
GLUCOSE BLD-MCNC: 230 MG/DL (ref 70–99)
GLUCOSE BLD-MCNC: 233 MG/DL (ref 70–99)
GLUCOSE BLD-MCNC: 234 MG/DL (ref 70–99)
GLUCOSE BLD-MCNC: 266 MG/DL (ref 70–99)
HCT VFR BLD CALC: 40.1 % (ref 40.5–52.5)
HEMOGLOBIN: 13.1 G/DL (ref 13.5–17.5)
MCH RBC QN AUTO: 23 PG (ref 26–34)
MCHC RBC AUTO-ENTMCNC: 32.6 G/DL (ref 31–36)
MCV RBC AUTO: 70.6 FL (ref 80–100)
PDW BLD-RTO: 17.3 % (ref 12.4–15.4)
PERFORMED ON: ABNORMAL
PLATELET # BLD: 176 K/UL (ref 135–450)
PMV BLD AUTO: 8.4 FL (ref 5–10.5)
POTASSIUM SERPL-SCNC: 4 MMOL/L (ref 3.5–5.1)
RBC # BLD: 5.67 M/UL (ref 4.2–5.9)
SARS-COV-2: DETECTED
SODIUM BLD-SCNC: 136 MMOL/L (ref 136–145)
WBC # BLD: 5.7 K/UL (ref 4–11)

## 2022-01-18 PROCEDURE — 6360000002 HC RX W HCPCS: Performed by: INTERNAL MEDICINE

## 2022-01-18 PROCEDURE — 6370000000 HC RX 637 (ALT 250 FOR IP): Performed by: INTERNAL MEDICINE

## 2022-01-18 PROCEDURE — 80048 BASIC METABOLIC PNL TOTAL CA: CPT

## 2022-01-18 PROCEDURE — 2700000000 HC OXYGEN THERAPY PER DAY

## 2022-01-18 PROCEDURE — 94640 AIRWAY INHALATION TREATMENT: CPT

## 2022-01-18 PROCEDURE — 94761 N-INVAS EAR/PLS OXIMETRY MLT: CPT

## 2022-01-18 PROCEDURE — 36415 COLL VENOUS BLD VENIPUNCTURE: CPT

## 2022-01-18 PROCEDURE — 85027 COMPLETE CBC AUTOMATED: CPT

## 2022-01-18 PROCEDURE — 2060000000 HC ICU INTERMEDIATE R&B

## 2022-01-18 RX ORDER — ALBUTEROL SULFATE 90 UG/1
2 AEROSOL, METERED RESPIRATORY (INHALATION) 4 TIMES DAILY
Status: DISCONTINUED | OUTPATIENT
Start: 2022-01-18 | End: 2022-01-20 | Stop reason: HOSPADM

## 2022-01-18 RX ORDER — CLOPIDOGREL BISULFATE 75 MG/1
75 TABLET ORAL DAILY
Status: DISCONTINUED | OUTPATIENT
Start: 2022-01-18 | End: 2022-01-20 | Stop reason: HOSPADM

## 2022-01-18 RX ORDER — ACETAMINOPHEN, ASPIRIN AND CAFFEINE 250; 250; 65 MG/1; MG/1; MG/1
1 TABLET, FILM COATED ORAL EVERY 6 HOURS PRN
Status: DISCONTINUED | OUTPATIENT
Start: 2022-01-18 | End: 2022-01-20 | Stop reason: HOSPADM

## 2022-01-18 RX ORDER — SPIRONOLACTONE 25 MG/1
25 TABLET ORAL DAILY
Status: DISCONTINUED | OUTPATIENT
Start: 2022-01-18 | End: 2022-01-20 | Stop reason: HOSPADM

## 2022-01-18 RX ORDER — ALBUTEROL SULFATE 90 UG/1
2 AEROSOL, METERED RESPIRATORY (INHALATION) EVERY 4 HOURS PRN
Status: DISCONTINUED | OUTPATIENT
Start: 2022-01-18 | End: 2022-01-20 | Stop reason: HOSPADM

## 2022-01-18 RX ORDER — NITROGLYCERIN 0.4 MG/1
0.4 TABLET SUBLINGUAL EVERY 5 MIN PRN
Status: DISCONTINUED | OUTPATIENT
Start: 2022-01-18 | End: 2022-01-20 | Stop reason: HOSPADM

## 2022-01-18 RX ORDER — DOXYCYCLINE HYCLATE 100 MG
100 TABLET ORAL 2 TIMES DAILY
Status: DISCONTINUED | OUTPATIENT
Start: 2022-01-18 | End: 2022-01-20 | Stop reason: HOSPADM

## 2022-01-18 RX ORDER — METOPROLOL SUCCINATE 50 MG/1
50 TABLET, EXTENDED RELEASE ORAL DAILY
Status: DISCONTINUED | OUTPATIENT
Start: 2022-01-18 | End: 2022-01-20 | Stop reason: HOSPADM

## 2022-01-18 RX ORDER — ASPIRIN 81 MG/1
81 TABLET ORAL DAILY
Status: DISCONTINUED | OUTPATIENT
Start: 2022-01-18 | End: 2022-01-20 | Stop reason: HOSPADM

## 2022-01-18 RX ORDER — LOSARTAN POTASSIUM 100 MG/1
100 TABLET ORAL DAILY
Status: DISCONTINUED | OUTPATIENT
Start: 2022-01-18 | End: 2022-01-19

## 2022-01-18 RX ORDER — METHYLPREDNISOLONE SODIUM SUCCINATE 40 MG/ML
40 INJECTION, POWDER, LYOPHILIZED, FOR SOLUTION INTRAMUSCULAR; INTRAVENOUS EVERY 8 HOURS
Status: DISCONTINUED | OUTPATIENT
Start: 2022-01-18 | End: 2022-01-19

## 2022-01-18 RX ORDER — ATORVASTATIN CALCIUM 10 MG/1
10 TABLET, FILM COATED ORAL DAILY
Status: DISCONTINUED | OUTPATIENT
Start: 2022-01-18 | End: 2022-01-20 | Stop reason: HOSPADM

## 2022-01-18 RX ORDER — ALBUTEROL SULFATE 2.5 MG/3ML
2.5 SOLUTION RESPIRATORY (INHALATION) EVERY 4 HOURS PRN
Status: DISCONTINUED | OUTPATIENT
Start: 2022-01-18 | End: 2022-01-18

## 2022-01-18 RX ORDER — DOXAZOSIN MESYLATE 4 MG/1
4 TABLET ORAL NIGHTLY
Status: DISCONTINUED | OUTPATIENT
Start: 2022-01-18 | End: 2022-01-20 | Stop reason: HOSPADM

## 2022-01-18 RX ORDER — BUDESONIDE AND FORMOTEROL FUMARATE DIHYDRATE 160; 4.5 UG/1; UG/1
1 AEROSOL RESPIRATORY (INHALATION) DAILY
Status: DISCONTINUED | OUTPATIENT
Start: 2022-01-18 | End: 2022-01-20 | Stop reason: HOSPADM

## 2022-01-18 RX ORDER — CLONAZEPAM 1 MG/1
1 TABLET ORAL 2 TIMES DAILY PRN
Status: DISCONTINUED | OUTPATIENT
Start: 2022-01-18 | End: 2022-01-20 | Stop reason: HOSPADM

## 2022-01-18 RX ADMIN — CLONAZEPAM 1 MG: 1 TABLET ORAL at 00:52

## 2022-01-18 RX ADMIN — ATORVASTATIN CALCIUM 10 MG: 10 TABLET, FILM COATED ORAL at 08:49

## 2022-01-18 RX ADMIN — Medication 1 PUFF: at 09:36

## 2022-01-18 RX ADMIN — ENOXAPARIN SODIUM 40 MG: 40 INJECTION SUBCUTANEOUS at 08:50

## 2022-01-18 RX ADMIN — SPIRONOLACTONE 25 MG: 25 TABLET ORAL at 08:50

## 2022-01-18 RX ADMIN — TIOTROPIUM BROMIDE INHALATION SPRAY 2 PUFF: 3.12 SPRAY, METERED RESPIRATORY (INHALATION) at 09:36

## 2022-01-18 RX ADMIN — ROFLUMILAST 500 MCG: 500 TABLET ORAL at 08:57

## 2022-01-18 RX ADMIN — METHYLPREDNISOLONE SODIUM SUCCINATE 40 MG: 40 INJECTION, POWDER, FOR SOLUTION INTRAMUSCULAR; INTRAVENOUS at 08:50

## 2022-01-18 RX ADMIN — DOXAZOSIN 4 MG: 4 TABLET ORAL at 00:51

## 2022-01-18 RX ADMIN — ENOXAPARIN SODIUM 40 MG: 40 INJECTION SUBCUTANEOUS at 00:51

## 2022-01-18 RX ADMIN — Medication 2 PUFF: at 21:29

## 2022-01-18 RX ADMIN — CLOPIDOGREL BISULFATE 75 MG: 75 TABLET ORAL at 08:50

## 2022-01-18 RX ADMIN — CLONAZEPAM 1 MG: 1 TABLET ORAL at 21:16

## 2022-01-18 RX ADMIN — Medication 2 PUFF: at 09:35

## 2022-01-18 RX ADMIN — DOXAZOSIN 4 MG: 4 TABLET ORAL at 20:25

## 2022-01-18 RX ADMIN — METHYLPREDNISOLONE SODIUM SUCCINATE 40 MG: 40 INJECTION, POWDER, FOR SOLUTION INTRAMUSCULAR; INTRAVENOUS at 17:36

## 2022-01-18 RX ADMIN — DOXYCYCLINE HYCLATE 100 MG: 100 TABLET, COATED ORAL at 20:25

## 2022-01-18 RX ADMIN — DOXYCYCLINE HYCLATE 100 MG: 100 TABLET, COATED ORAL at 00:52

## 2022-01-18 RX ADMIN — DOXYCYCLINE HYCLATE 100 MG: 100 TABLET, COATED ORAL at 08:49

## 2022-01-18 RX ADMIN — Medication 2 PUFF: at 13:35

## 2022-01-18 RX ADMIN — LOSARTAN POTASSIUM 100 MG: 100 TABLET, FILM COATED ORAL at 08:49

## 2022-01-18 RX ADMIN — ENOXAPARIN SODIUM 40 MG: 40 INJECTION SUBCUTANEOUS at 20:25

## 2022-01-18 RX ADMIN — METHYLPREDNISOLONE SODIUM SUCCINATE 40 MG: 40 INJECTION, POWDER, FOR SOLUTION INTRAMUSCULAR; INTRAVENOUS at 00:51

## 2022-01-18 RX ADMIN — METOPROLOL SUCCINATE 50 MG: 50 TABLET, EXTENDED RELEASE ORAL at 08:50

## 2022-01-18 RX ADMIN — ASPIRIN 81 MG: 81 TABLET, COATED ORAL at 08:49

## 2022-01-18 RX ADMIN — Medication 2 PUFF: at 17:00

## 2022-01-18 NOTE — PROGRESS NOTES
4 Eyes Skin Assessment     NAME:  Jaime Shields OF BIRTH:  1950  MEDICAL RECORD NUMBER:  [id number]    The patient is being assess for  Admission    I agree that 2 RN's have performed a thorough Head to Toe Skin Assessment on the patient. ALL assessment sites listed below have been assessed. Areas assessed by both nurses:    Head, Face, Ears, Shoulders, Back, Chest, Arms, Elbows, Hands, Sacrum. Buttock, Coccyx, Ischium and Legs. Feet and Heels        Does the Patient have a Wound?  No noted wound(s)       Triston Prevention initiated:  NA   Wound Care Orders initiated:  NA    Pressure Injury (Stage 3,4, Unstageable, DTI, NWPT, and Complex wounds) if present place consult order under [de-identified] NA    New and Established Ostomies if present place consult order under : NA      Nurse 1 eSignature: Electronically signed by Caron Bazzi RN on 1/18/22 at 4:34 AM EST    **SHARE this note so that the co-signing nurse is able to place an eSignature**    Nurse 2 eSignature: Electronically signed by Mary Kate Mobley RN on 1/18/22 at 4:42 AM EST

## 2022-01-18 NOTE — PROGRESS NOTES
01/18/22 0032   RT Protocol   History Pulmonary Disease 2   Respiratory pattern 4   Breath sounds 4   Cough 1   Indications for Bronchodilator Therapy Decreased or absent breath sounds   Bronchodilator Assessment Score 11

## 2022-01-18 NOTE — CARE COORDINATION
Discharge Planning Assessment  Readmission risk score 15%  RN discharge planner met with patient/ (and family member) to discuss reason for admission, current living situation, and potential needs at the time of discharge    Demographics/Insurance verified Yes    Current type of dwelling: single level home, no steps to enter    Patient from ECF/SW confirmed with: n/a    Living arrangements:with significant other    Level of function/Support: usually independent, drives    PCP: Bhanu    Last Visit to PCP: November 2021    DME: none    Active with any community resources/agencies/skilled home care: no skilled or non-skilled services    Medication compliance issues:uses [facility] on [address] issues that could impact healthcare: not identified      Tentative discharge plan:home'    Discussed and provided facilities of choice if transition to a skilled nursing facility is required at the time of discharge      Discussed with patient and/or family that on the day of discharge home tentative time of discharge will be between 10 AM and noon.     Transportation at the time of discharge: drove self here, car is in the ER parking lot     HUANG SheikhN, [address], [address]ne [facility] [phone]

## 2022-01-18 NOTE — ED NOTES
Pt received in bed resting, breathing easy, non-labored. No distress noted. Pt complaint free, states symptoms feel like they are improving since arrival. Pt received on 2L O2 via NC, tolerating well. On bedside tele and continuous SpO2 monitoring, alarms audible. Pt had meal tray, tolerated well. Linens cleaned and changed, Pt gown cleaned and changed.       Greg Cunha RN  01/17/22 2028

## 2022-01-18 NOTE — ED NOTES
Attempt to call report to 3t, receiving RN unable to take report at this time. Will call back, charge RN aware.       Ron Moreno RN  01/17/22 0044

## 2022-01-18 NOTE — RT PROTOCOL NOTE
RT Inhaler-Nebulizer Bronchodilator Protocol Note    There is a bronchodilator order in the chart from a provider indicating to follow the RT Bronchodilator Protocol and there is an Initiate RT Inhaler-Nebulizer Bronchodilator Protocol order as well (see protocol at bottom of note). CXR Findings:  XR CHEST PORTABLE    Result Date: 1/17/2022  Mild indistinctness of the pulmonary vasculature is likely due to technique, early edema could have a similar appearance. Further evaluation with upright PA and lateral views in full inspiration may be helpful for better characterization. The findings from the last RT Protocol Assessment were as follows:   History Pulmonary Disease: Chronic pulmonary disease  Respiratory Pattern: Mild dyspnea at rest, irregular pattern, or RR 21-25 bpm  Breath Sounds: Intermittent or unilateral wheezes  Cough: Strong, productive  Indication for Bronchodilator Therapy: Decreased or absent breath sounds  Bronchodilator Assessment Score: 11    Aerosolized bronchodilator medication orders have been revised according to the RT Inhaler-Nebulizer Bronchodilator Protocol below. Respiratory Therapist to perform RT Therapy Protocol Assessment initially then follow the protocol. Repeat RT Therapy Protocol Assessment PRN for score 0-3 or on second treatment, BID, and PRN for scores above 3. No Indications - adjust the frequency to every 6 hours PRN wheezing or bronchospasm, if no treatments needed after 48 hours then discontinue using Per Protocol order mode. If indication present, adjust the RT bronchodilator orders based on the Bronchodilator Assessment Score as indicated below.   Use Inhaler orders unless patient has one or more of the following: on home nebulizer, not able to hold breath for 10 seconds, is not alert and oriented, cannot activate and use MDI correctly, or respiratory rate 25 breaths per minute or more, then use the equivalent nebulizer order(s) with same Frequency and PRN reasons based on the score. If a patient is on this medication at home then do not decrease Frequency below that used at home. 0-3 - enter or revise RT bronchodilator order(s) to equivalent RT Bronchodilator order with Frequency of every 4 hours PRN for wheezing or increased work of breathing using Per Protocol order mode. 4-6 - enter or revise RT Bronchodilator order(s) to two equivalent RT bronchodilator orders with one order with BID Frequency and one order with Frequency of every 4 hours PRN wheezing or increased work of breathing using Per Protocol order mode. 7-10 - enter or revise RT Bronchodilator order(s) to two equivalent RT bronchodilator orders with one order with TID Frequency and one order with Frequency of every 4 hours PRN wheezing or increased work of breathing using Per Protocol order mode. 11-13 - enter or revise RT Bronchodilator order(s) to one equivalent RT bronchodilator order with QID Frequency and an Albuterol order with Frequency of every 4 hours PRN wheezing or increased work of breathing using Per Protocol order mode. Greater than 13 - enter or revise RT Bronchodilator order(s) to one equivalent RT bronchodilator order with every 4 hours Frequency and an Albuterol order with Frequency of every 2 hours PRN wheezing or increased work of breathing using Per Protocol order mode. RT to enter RT Home Evaluation for COPD & MDI Assessment order using Per Protocol order mode.     Electronically signed by Paula Howell RCP on 1/18/2022 at 12:33 AM

## 2022-01-18 NOTE — ED NOTES
Pt assisted to use bed side commode. Urine collected and sent. Pt complaint free at this time, voices no current needs. Maintained on O2 and bedside tele, alarms audible.       Eva Alvarenga RN  01/17/22 2387

## 2022-01-18 NOTE — ED NOTES
Report called to [facility]. Pt transported to unit, care transferred without incident. Receiving RN present for transfer.       Rianna Garcia RN  01/18/22 4216

## 2022-01-19 PROBLEM — R00.2 PALPITATIONS: Status: RESOLVED | Noted: 2021-06-30 | Resolved: 2022-01-19

## 2022-01-19 PROBLEM — U07.1 COVID-19 VIRUS INFECTION: Status: ACTIVE | Noted: 2022-01-19

## 2022-01-19 LAB
C-REACTIVE PROTEIN: 67.4 MG/L (ref 0–5.1)
D DIMER: 361 NG/ML DDU (ref 0–229)
GLUCOSE BLD-MCNC: 151 MG/DL (ref 70–99)
GLUCOSE BLD-MCNC: 173 MG/DL (ref 70–99)
GLUCOSE BLD-MCNC: 217 MG/DL (ref 70–99)
GLUCOSE BLD-MCNC: 245 MG/DL (ref 70–99)
PERFORMED ON: ABNORMAL

## 2022-01-19 PROCEDURE — 86140 C-REACTIVE PROTEIN: CPT

## 2022-01-19 PROCEDURE — 6370000000 HC RX 637 (ALT 250 FOR IP): Performed by: INTERNAL MEDICINE

## 2022-01-19 PROCEDURE — 83036 HEMOGLOBIN GLYCOSYLATED A1C: CPT

## 2022-01-19 PROCEDURE — 6360000002 HC RX W HCPCS: Performed by: INTERNAL MEDICINE

## 2022-01-19 PROCEDURE — 2060000000 HC ICU INTERMEDIATE R&B

## 2022-01-19 PROCEDURE — 36415 COLL VENOUS BLD VENIPUNCTURE: CPT

## 2022-01-19 PROCEDURE — 85379 FIBRIN DEGRADATION QUANT: CPT

## 2022-01-19 PROCEDURE — 94761 N-INVAS EAR/PLS OXIMETRY MLT: CPT

## 2022-01-19 PROCEDURE — 94640 AIRWAY INHALATION TREATMENT: CPT

## 2022-01-19 PROCEDURE — 2700000000 HC OXYGEN THERAPY PER DAY

## 2022-01-19 RX ORDER — INSULIN LISPRO 100 [IU]/ML
0-6 INJECTION, SOLUTION INTRAVENOUS; SUBCUTANEOUS NIGHTLY
Status: DISCONTINUED | OUTPATIENT
Start: 2022-01-19 | End: 2022-01-20 | Stop reason: HOSPADM

## 2022-01-19 RX ORDER — DEXTROSE MONOHYDRATE 25 G/50ML
12.5 INJECTION, SOLUTION INTRAVENOUS PRN
Status: DISCONTINUED | OUTPATIENT
Start: 2022-01-19 | End: 2022-01-19 | Stop reason: RX

## 2022-01-19 RX ORDER — INSULIN LISPRO 100 [IU]/ML
0-12 INJECTION, SOLUTION INTRAVENOUS; SUBCUTANEOUS
Status: DISCONTINUED | OUTPATIENT
Start: 2022-01-19 | End: 2022-01-20 | Stop reason: HOSPADM

## 2022-01-19 RX ORDER — LOSARTAN POTASSIUM 25 MG/1
50 TABLET ORAL DAILY
Status: DISCONTINUED | OUTPATIENT
Start: 2022-01-20 | End: 2022-01-20 | Stop reason: HOSPADM

## 2022-01-19 RX ORDER — NICOTINE POLACRILEX 4 MG
15 LOZENGE BUCCAL PRN
Status: DISCONTINUED | OUTPATIENT
Start: 2022-01-19 | End: 2022-01-20 | Stop reason: HOSPADM

## 2022-01-19 RX ORDER — DEXTROSE MONOHYDRATE 50 MG/ML
100 INJECTION, SOLUTION INTRAVENOUS PRN
Status: DISCONTINUED | OUTPATIENT
Start: 2022-01-19 | End: 2022-01-20 | Stop reason: HOSPADM

## 2022-01-19 RX ORDER — METHYLPREDNISOLONE SODIUM SUCCINATE 40 MG/ML
40 INJECTION, POWDER, LYOPHILIZED, FOR SOLUTION INTRAMUSCULAR; INTRAVENOUS EVERY 12 HOURS
Status: DISCONTINUED | OUTPATIENT
Start: 2022-01-20 | End: 2022-01-20 | Stop reason: HOSPADM

## 2022-01-19 RX ADMIN — INSULIN LISPRO 4 UNITS: 100 INJECTION, SOLUTION INTRAVENOUS; SUBCUTANEOUS at 12:54

## 2022-01-19 RX ADMIN — METFORMIN HYDROCHLORIDE 1000 MG: 500 TABLET ORAL at 17:36

## 2022-01-19 RX ADMIN — METHYLPREDNISOLONE SODIUM SUCCINATE 40 MG: 40 INJECTION, POWDER, FOR SOLUTION INTRAMUSCULAR; INTRAVENOUS at 08:53

## 2022-01-19 RX ADMIN — DOXYCYCLINE HYCLATE 100 MG: 100 TABLET, COATED ORAL at 21:10

## 2022-01-19 RX ADMIN — Medication 2 PUFF: at 16:43

## 2022-01-19 RX ADMIN — ASPIRIN 81 MG: 81 TABLET, COATED ORAL at 08:53

## 2022-01-19 RX ADMIN — Medication 1 PUFF: at 09:19

## 2022-01-19 RX ADMIN — CLOPIDOGREL BISULFATE 75 MG: 75 TABLET ORAL at 08:53

## 2022-01-19 RX ADMIN — ENOXAPARIN SODIUM 40 MG: 40 INJECTION SUBCUTANEOUS at 08:52

## 2022-01-19 RX ADMIN — ATORVASTATIN CALCIUM 10 MG: 10 TABLET, FILM COATED ORAL at 08:53

## 2022-01-19 RX ADMIN — Medication 2 PUFF: at 09:20

## 2022-01-19 RX ADMIN — TIOTROPIUM BROMIDE INHALATION SPRAY 2 PUFF: 3.12 SPRAY, METERED RESPIRATORY (INHALATION) at 09:20

## 2022-01-19 RX ADMIN — Medication 2 PUFF: at 12:11

## 2022-01-19 RX ADMIN — ROFLUMILAST 500 MCG: 500 TABLET ORAL at 08:53

## 2022-01-19 RX ADMIN — METHYLPREDNISOLONE SODIUM SUCCINATE 40 MG: 40 INJECTION, POWDER, FOR SOLUTION INTRAMUSCULAR; INTRAVENOUS at 00:42

## 2022-01-19 RX ADMIN — ENOXAPARIN SODIUM 40 MG: 40 INJECTION SUBCUTANEOUS at 21:10

## 2022-01-19 RX ADMIN — INSULIN GLARGINE 31 UNITS: 100 INJECTION, SOLUTION SUBCUTANEOUS at 21:39

## 2022-01-19 RX ADMIN — DOXYCYCLINE HYCLATE 100 MG: 100 TABLET, COATED ORAL at 08:53

## 2022-01-19 RX ADMIN — INSULIN LISPRO 4 UNITS: 100 INJECTION, SOLUTION INTRAVENOUS; SUBCUTANEOUS at 17:36

## 2022-01-19 RX ADMIN — METHYLPREDNISOLONE SODIUM SUCCINATE 40 MG: 40 INJECTION, POWDER, FOR SOLUTION INTRAMUSCULAR; INTRAVENOUS at 17:36

## 2022-01-19 RX ADMIN — INSULIN LISPRO 2 UNITS: 100 INJECTION, SOLUTION INTRAVENOUS; SUBCUTANEOUS at 21:39

## 2022-01-19 RX ADMIN — Medication 2 PUFF: at 20:45

## 2022-01-19 RX ADMIN — DOXAZOSIN 4 MG: 4 TABLET ORAL at 21:10

## 2022-01-19 RX ADMIN — CLONAZEPAM 1 MG: 1 TABLET ORAL at 21:41

## 2022-01-19 ASSESSMENT — PAIN SCALES - GENERAL: PAINLEVEL_OUTOF10: 0

## 2022-01-19 NOTE — H&P
[facility]                     [facility], [address]                              HISTORY AND PHYSICAL    PATIENT NAME: Samantha Mendiola                      :        1950  MED REC NO:   [id number]                          ROOM:       5042  ACCOUNT NO:   [de-identified]                           ADMIT DATE: 2022  PROVIDER:     Estrada Morillo MD    HISTORY OF PRESENT ILLNESS:  The patient is a 67-year-old white  gentleman, came to the emergency room with history of known COPD, now  has increasing shortness of breath and wheezing with a low oxygen  saturation that needed about 1 to 2 liters of nasal cannula oxygen. The  patient denies any chest pain, did have earlier some fever. There is  also some congestion and shortness of breath going on for 2 weeks. The  patient last week saw his PCP and he got the prescription of prednisone  and doxycycline. Even after completing most of that, the patient is not  feeling any better. The patient denies any chest pain. No  palpitations. No hemoptysis. No abdominal pain. No lower extremity  swelling. The patient feels fatigued. The patient is somewhat  orthopneic. The patient is on home oxygen. PAST MEDICAL HISTORY:  Pertinent for COPD, coronary artery disease,  depression, diabetes mellitus, dysphagia, enlarged prostate,  cardiomegaly, hypertension, MRSA infection, obesity, pneumonia, and  obstructive sleep apnea. PAST SURGICAL HISTORY:  Pertinent for coronary angioplasty,  bronchoscopy, cystoscopy with biopsy, TURP, cystoscopy more than one  occasion, prostate surgery, gallbladder surgery, dental surgery, and  cholecystectomy. MEDICATIONS:  The patient is on albuterol, aspirin, atorvastatin,  Symbicort, Plavix, Cardura, Vibra-Tabs, Lovenox, losartan, metformin,  Toprol, Daliresp, Aldactone, and Spiriva. ALLERGIES:  The patient is allergic to LISINOPRIL.     SOCIAL HISTORY:  He is a  man, but he lives with his ex-wife  under same room, and she helps him out. REVIEW OF SYSTEMS:  No history of angina pectoris. No abdominal pain. No hematemesis. No melena. PHYSICAL EXAMINATION:  LUNGS:  Show bilateral expiratory wheezes. Scattered crackles. ABDOMEN:  Benign. EXTREMITIES:  Show trace edema. NEUROLOGIC:  Grossly intact. LABORATORY DATA:  Lab evaluation shows sodium 136, potassium 4.0,  chloride 101, CO2 of 19, BUN 30, creatinine 0.9, anion gap is 16, blood  glucose is 234. White blood cell count 5.7, hemoglobin/hematocrit of  13.1 and 40.1, platelet count 485. PT/INR is 15.6 and 1.36. Microbiology reports are not indicated on the urine. The patient also  had some blood cultures. There was no growth detected in the culture. Urinalysis negative. PFT as directed. CT chest with pulmonary  vasculature with contrast.  Fluoroscopy not indicated. Chest x-ray  combined. Patient Active Problem List   Diagnosis    Mixed hyperlipidemia    Left ventricular hypertrophy    Obesity    Essential hypertension, malignant    KARYNA (obstructive sleep apnea)    Restrictive lung disease    Chronic cough    Coronary artery disease involving native coronary artery of native heart with angina pectoris ([address]    Cardiomyopathy ([address]    COPD, severe ([address]    SOB (shortness of breath)    COPD with acute exacerbation ([address]    COVID-19 virus infection         PLAN:  We will start the patient on IV Solu-Medrol, DVT prophylaxis. nebulizer Rx , DVT prophylaxis supplemental O2         Camron Ramon MD    D: 01/18/2022 22:38:51       T: 01/18/2022 22:41:34     [email or web address]  Job#: [id number]     Doc#: [id number]    CC:

## 2022-01-19 NOTE — PROGRESS NOTES
Awake, sitting up in bed, watching TV, pleasant. SOB on exertion, noted with occasional dry non productive cough, denies chest pain. VSS. Assessment completed, see flow charts. No respiratory distress noted. kianna

## 2022-01-20 VITALS
HEART RATE: 76 BPM | BODY MASS INDEX: 38.49 KG/M2 | OXYGEN SATURATION: 93 % | DIASTOLIC BLOOD PRESSURE: 73 MMHG | SYSTOLIC BLOOD PRESSURE: 111 MMHG | WEIGHT: 276 LBS | RESPIRATION RATE: 18 BRPM | TEMPERATURE: 97.5 F

## 2022-01-20 LAB
ANION GAP SERPL CALCULATED.3IONS-SCNC: 13 MMOL/L (ref 3–16)
BUN BLDV-MCNC: 33 MG/DL (ref 7–20)
CALCIUM SERPL-MCNC: 9.4 MG/DL (ref 8.3–10.6)
CHLORIDE BLD-SCNC: 101 MMOL/L (ref 99–110)
CO2: 23 MMOL/L (ref 21–32)
CREAT SERPL-MCNC: 0.9 MG/DL (ref 0.8–1.3)
ESTIMATED AVERAGE GLUCOSE: 119.8 MG/DL
GFR AFRICAN AMERICAN: >60
GFR NON-AFRICAN AMERICAN: >60
GLUCOSE BLD-MCNC: 120 MG/DL (ref 70–99)
GLUCOSE BLD-MCNC: 131 MG/DL (ref 70–99)
GLUCOSE BLD-MCNC: 136 MG/DL (ref 70–99)
HBA1C MFR BLD: 5.8 %
HCT VFR BLD CALC: 35.8 % (ref 40.5–52.5)
HEMOGLOBIN: 11.6 G/DL (ref 13.5–17.5)
MCH RBC QN AUTO: 23.1 PG (ref 26–34)
MCHC RBC AUTO-ENTMCNC: 32.3 G/DL (ref 31–36)
MCV RBC AUTO: 71.3 FL (ref 80–100)
PDW BLD-RTO: 16.8 % (ref 12.4–15.4)
PERFORMED ON: ABNORMAL
PERFORMED ON: ABNORMAL
PLATELET # BLD: 209 K/UL (ref 135–450)
PMV BLD AUTO: 8 FL (ref 5–10.5)
POTASSIUM SERPL-SCNC: 4.1 MMOL/L (ref 3.5–5.1)
RBC # BLD: 5.03 M/UL (ref 4.2–5.9)
SODIUM BLD-SCNC: 137 MMOL/L (ref 136–145)
WBC # BLD: 11.4 K/UL (ref 4–11)

## 2022-01-20 PROCEDURE — 6370000000 HC RX 637 (ALT 250 FOR IP): Performed by: INTERNAL MEDICINE

## 2022-01-20 PROCEDURE — 94761 N-INVAS EAR/PLS OXIMETRY MLT: CPT

## 2022-01-20 PROCEDURE — 85027 COMPLETE CBC AUTOMATED: CPT

## 2022-01-20 PROCEDURE — 94680 O2 UPTK RST&XERS DIR SIMPLE: CPT

## 2022-01-20 PROCEDURE — 2700000000 HC OXYGEN THERAPY PER DAY

## 2022-01-20 PROCEDURE — 36415 COLL VENOUS BLD VENIPUNCTURE: CPT

## 2022-01-20 PROCEDURE — 94640 AIRWAY INHALATION TREATMENT: CPT

## 2022-01-20 PROCEDURE — 6360000002 HC RX W HCPCS: Performed by: INTERNAL MEDICINE

## 2022-01-20 PROCEDURE — 80048 BASIC METABOLIC PNL TOTAL CA: CPT

## 2022-01-20 RX ORDER — INSULIN LISPRO 100 [IU]/ML
0-6 INJECTION, SOLUTION INTRAVENOUS; SUBCUTANEOUS NIGHTLY
Qty: 1 PEN | Refills: 0 | Status: SHIPPED | OUTPATIENT
Start: 2022-01-20

## 2022-01-20 RX ORDER — LOSARTAN POTASSIUM 50 MG/1
50 TABLET ORAL DAILY
Qty: 30 TABLET | Refills: 3 | Status: SHIPPED | OUTPATIENT
Start: 2022-01-21 | End: 2022-04-20 | Stop reason: SDUPTHER

## 2022-01-20 RX ORDER — PREDNISONE 20 MG/1
TABLET ORAL
Qty: 10 TABLET | Refills: 0 | Status: SHIPPED | OUTPATIENT
Start: 2022-01-20 | End: 2022-01-30

## 2022-01-20 RX ORDER — NICOTINE POLACRILEX 4 MG
15 LOZENGE BUCCAL PRN
Qty: 45 G | Refills: 1 | Status: SHIPPED | OUTPATIENT
Start: 2022-01-20

## 2022-01-20 RX ORDER — INSULIN LISPRO 100 [IU]/ML
0-12 INJECTION, SOLUTION INTRAVENOUS; SUBCUTANEOUS
Qty: 1 PEN | Refills: 0 | Status: SHIPPED | OUTPATIENT
Start: 2022-01-20

## 2022-01-20 RX ADMIN — ENOXAPARIN SODIUM 40 MG: 40 INJECTION SUBCUTANEOUS at 08:33

## 2022-01-20 RX ADMIN — ROFLUMILAST 500 MCG: 500 TABLET ORAL at 08:34

## 2022-01-20 RX ADMIN — Medication 2 PUFF: at 13:04

## 2022-01-20 RX ADMIN — Medication 1 PUFF: at 13:05

## 2022-01-20 RX ADMIN — METFORMIN HYDROCHLORIDE 1000 MG: 500 TABLET ORAL at 08:33

## 2022-01-20 RX ADMIN — LOSARTAN POTASSIUM 50 MG: 25 TABLET, FILM COATED ORAL at 08:34

## 2022-01-20 RX ADMIN — SPIRONOLACTONE 25 MG: 25 TABLET ORAL at 08:33

## 2022-01-20 RX ADMIN — METHYLPREDNISOLONE SODIUM SUCCINATE 40 MG: 40 INJECTION, POWDER, FOR SOLUTION INTRAMUSCULAR; INTRAVENOUS at 06:21

## 2022-01-20 RX ADMIN — DOXYCYCLINE HYCLATE 100 MG: 100 TABLET, COATED ORAL at 08:33

## 2022-01-20 RX ADMIN — ATORVASTATIN CALCIUM 10 MG: 10 TABLET, FILM COATED ORAL at 08:33

## 2022-01-20 RX ADMIN — CLOPIDOGREL BISULFATE 75 MG: 75 TABLET ORAL at 08:33

## 2022-01-20 RX ADMIN — TIOTROPIUM BROMIDE INHALATION SPRAY 2 PUFF: 3.12 SPRAY, METERED RESPIRATORY (INHALATION) at 13:05

## 2022-01-20 RX ADMIN — ASPIRIN 81 MG: 81 TABLET, COATED ORAL at 08:37

## 2022-01-20 ASSESSMENT — PAIN SCALES - GENERAL: PAINLEVEL_OUTOF10: 0

## 2022-01-20 NOTE — DISCHARGE INSTR - COC
Continuity of Care Form    Patient Name: Leo Vitale   :  1950  MRN:  [id number]    [facility] date:  2022  Discharge date:  2022    Code Status Order: Prior   Advance Directives:      Admitting Physician:  Del Guillaume MD  PCP: Edgar Martinez MD    Discharging Nurse: [facility]/Room#: [id number]-2765/9379-99  Discharging Unit Phone Number: ***    Emergency Contact:   Extended Emergency Contact Information  Primary Emergency Contact: Lisa Coleman, [address] Phone: [phone]  Mobile Phone: [phone]  Relation: Domestic Partner    Past Surgical History:  Past Surgical History:   Procedure Laterality Date    BRONCHOSCOPY  2018    CHOLECYSTECTOMY      CORONARY ANGIOPLASTY  2017    CYSTOSCOPY N/A 2021    CYSTOSCOPY FULGARATION AND EVACUATION OF CLOTS performed by Kathie Vincent MD at [address] [place] [address] N/A 3/15/2021    RIGID CYSTOSCOPY WITH RESECTION OF BLADDER TUMOR performed by Joy Cali MD at [facility]      teeth removed    GALLBLADDER SURGERY      gall stones    PROSTATE SURGERY      TURP N/A 3/15/2021    WITH TRANSURETHRAL RESECTION OF PROSTATE performed by Joy Cali MD at [facility] OR       Immunization History:   Immunization History   Administered Date(s) Administered    Influenza Vaccine, unspecified formulation 10/23/2015    Influenza, High Dose (Fluzone 65 yrs and older) 10/23/2015, 10/18/2016, 11/15/2017    Pneumococcal Conjugate 13-valent ([id number]) 2015    Pneumococcal Polysaccharide ([id number]) 11/15/2017    Tdap (Boostrix, Adacel) 02/15/2018       Active Problems:  Patient Active Problem List   Diagnosis Code    Mixed hyperlipidemia E78.2    Left ventricular hypertrophy I51.7    Obesity E66.9    Essential hypertension, malignant I10    KARYNA (obstructive sleep apnea) G47.33    Restrictive lung disease J98.4    Chronic cough R05.3 Coronary artery disease involving native coronary artery of native heart with angina pectoris (HCC) I25.119    Cardiomyopathy (HCC) I42.9    COPD, severe (HCC) J44.9    SOB (shortness of breath) R06.02    COPD with acute exacerbation (HCC) J44.1    COVID-19 virus infection U07.1       Isolation/Infection:   Isolation            Droplet Plus          Unreconciled Outside Infections       Enable clinical decision support by reconciling outside information with the patient's chart. .      Infection Onset Last Indicated Last Received Source    COVID-19 (Rule Out) 06/06/21 06/06/21 06/16/21 [facility]           Patient Infection Status       Infection Onset Added Last Indicated Last Indicated By Review Planned Expiration Resolved Resolved By    COVID-19 01/17/22 01/18/22 01/17/22 COVID-19 01/25/22 01/31/22      Resolved    COVID-19 (Rule Out) 01/17/22 01/17/22 01/17/22 COVID-19 (Ordered)   01/18/22 Rule-Out Test Resulted    MRSA  08/24/18 08/24/18 Jhoana Walker, RN   04/12/21 Austyn Balderas RN    8/17/18; sputum            Nurse Assessment:  Last Vital Signs: /69   Pulse 55   Temp 97.5 °F (36.4 °C) (Oral)   Resp 18   Wt 276 lb (125.2 kg)   SpO2 92%   BMI 38.49 kg/m²     Last documented pain score (0-10 scale): Pain Level: 0  Last Weight:   Wt Readings from Last 1 Encounters:   01/17/22 276 lb (125.2 kg)     Mental Status:  {IP PT MENTAL STATUS:20030}    IV Access:  { DONTE IV ACCESS:818907196}    Nursing Mobility/ADLs:  Walking   {P DME CSRU:557192548}  Transfer  {P DME UYTW:403183026}  Bathing  {P DME ULGH:739982529}  Dressing  {P DME FSKS:333538983}  Toileting  {P DME XAAZ:298878665}  Feeding  {P DME UXMU:314264327}  Med Admin  {[facility] DME ZNJW:817926677}  Med Delivery   { DONTE MED Delivery:938458036}    Wound Care Documentation and Therapy:        Elimination:  Continence:    Bowel: {YES / ZR:36695}  Bladder: {YES / :70459}  Urinary Catheter: {Urinary Catheter:545797680} Colostomy/Ileostomy/Ileal Conduit: {YES / CA:26074}       Date of Last BM: ***  No intake or output data in the 24 hours ending 22 0929  No intake/output data recorded. Safety Concerns:     [address] DONTE Safety Concerns:892015781}    Impairments/Disabilities:      [facility] Impairments/Disabilities:315836225}    Nutrition Therapy:  Current Nutrition Therapy:   [facility] Diet List:567112129}    Routes of Feeding: {CHP DME Other Feedings:973229180}  Liquids: {Slp liquid thickness:30222}  Daily Fluid Restriction: {CHP DME Yes amt example:203462426}  Last Modified Barium Swallow with Video (Video Swallowing Test): {Done Not Done WNBP:735796623}    Treatments at the Time of Hospital Discharge:   Respiratory Treatments: ***  Oxygen Therapy:  {Therapy; copd oxygen:97907}  Ventilator:    {MH CC Vent JMXH:069600195}    Rehab Therapies: SN, PT  Weight Bearing Status/Restrictions: [facility] Weight Bearin}  Other Medical Equipment (for information only, NOT a DME order):  {EQUIPMENT:627221631}  Other Treatments: ***    Patient's personal belongings (please select all that are sent with patient):  {[facility] DME Belongings:484967190}    RN SIGNATURE:  {Esignature:612443281}    CASE MANAGEMENT/SOCIAL WORK SECTION    Inpatient Status Date: ***    Readmission Risk Assessment Score:  Readmission Risk              Risk of Unplanned Readmission:  20           Discharging to Facility/ [address]       Phone -[phone]              Dialysis Facility (if applicable)   Name:  Address:  Dialysis Schedule:  Phone:  Fax:    / signature: {Esignature:330092774}    PHYSICIAN SECTION    Prognosis: Guarded    Condition at Discharge: Stable    Rehab Potential (if transferring to Rehab):  Fair    Recommended Labs or Other Treatments After Discharge: home health care , DME home O2 freeman a couple weeks    Follow up with PMD in 1-2 weeks     Physician Certification: I certify the above information and transfer of Dexter Morocho  is necessary for the continuing treatment of the diagnosis listed and that he requires [address] for less 30 days.      Update Admission H&P: No change in H&P    PHYSICIAN SIGNATURE:  Electronically signed by Lulu Kennedy MD on 1/20/22 at 9:30 AM EST

## 2022-01-20 NOTE — PROGRESS NOTES
Department of Internal Medicine  General Internal Medicine   Progress Note      SUBJECTIVE: moderate SOB  ovwer all quite alert and competent     History obtained from chart review and the patient  General ROS: positive for  - fatigue and malaise  negative for - chills, fever, night sweats or weight loss  Psychological ROS: negative  Ophthalmic ROS: negative  Respiratory ROS: no cough, shortness of breath, or wheezing  Cardiovascular ROS: no chest pain or dyspnea on exertion  Gastrointestinal ROS: no abdominal pain, change in bowel habits, or black or bloody stools  Genito-Urinary ROS: positive for - change in urinary stream, dysuria, hematuria and urinary frequency/urgency  negative for - pelvic pain  Musculoskeletal ROS: negative  Neurological ROS: no TIA or stroke symptoms  Dermatological ROS: negative    OBJECTIVE      Medications      Current Facility-Administered Medications: glucose (GLUTOSE) 40 % oral gel 15 g, 15 g, Oral, PRN  glucagon (rDNA) injection 1 mg, 1 mg, IntraMUSCular, PRN  dextrose 5 % solution, 100 mL/hr, IntraVENous, PRN  [START ON 1/20/2022] losartan (COZAAR) tablet 50 mg, 50 mg, Oral, Daily  insulin lispro (1 Unit Dial) 0-12 Units, 0-12 Units, SubCUTAneous, TID WC  insulin lispro (1 Unit Dial) 0-6 Units, 0-6 Units, SubCUTAneous, Nightly  insulin glargine (LANTUS;BASAGLAR) injection pen 31 Units, 0.25 Units/kg, SubCUTAneous, Nightly  dextrose bolus (hypoglycemia) 10% 125 mL, 125 mL, IntraVENous, PRN **OR** dextrose bolus (hypoglycemia) 10% 250 mL, 250 mL, IntraVENous, PRN  albuterol sulfate  (90 Base) MCG/ACT inhaler 2 puff, 2 puff, Inhalation, Q4H PRN  aspirin EC tablet 81 mg, 81 mg, Oral, Daily  aspirin-acetaminophen-caffeine (EXCEDRIN MIGRAINE) per tablet 1 tablet, 1 tablet, Oral, Q6H PRN  clonazePAM (KLONOPIN) tablet 1 mg, 1 mg, Oral, BID PRN  clopidogrel (PLAVIX) tablet 75 mg, 75 mg, Oral, Daily  doxazosin (CARDURA) tablet 4 mg, 4 mg, Oral, Nightly  doxycycline hyclate (VIBRA-TABS) tablet 100 mg, 100 mg, Oral, BID  budesonide-formoterol (SYMBICORT) 160-4.5 MCG/ACT inhaler 1 puff, 1 puff, Inhalation, Daily  metFORMIN (GLUCOPHAGE) tablet 1,000 mg, 1,000 mg, Oral, BID WC  [Held by provider] metoprolol succinate (TOPROL XL) extended release tablet 50 mg, 50 mg, Oral, Daily  nitroGLYCERIN (NITROSTAT) SL tablet 0.4 mg, 0.4 mg, SubLINGual, Q5 Min PRN  Roflumilast (DALIRESP) tablet 500 mcg, 500 mcg, Oral, Daily  atorvastatin (LIPITOR) tablet 10 mg, 10 mg, Oral, Daily  spironolactone (ALDACTONE) tablet 25 mg, 25 mg, Oral, Daily  methylPREDNISolone sodium (SOLU-MEDROL) injection 40 mg, 40 mg, IntraVENous, Q8H  enoxaparin (LOVENOX) injection 40 mg, 40 mg, SubCUTAneous, BID  tiotropium (SPIRIVA RESPIMAT) 2.5 MCG/ACT inhaler 2 puff, 2 puff, Inhalation, Daily  albuterol sulfate  (90 Base) MCG/ACT inhaler 2 puff, 2 puff, Inhalation, 4x daily    Physical      Vitals: /73   Pulse 57   Temp 97.5 °F (36.4 °C) (Oral)   Resp 18   Wt 276 lb (125.2 kg)   SpO2 95%   BMI 38.49 kg/m²   Temp: Temp: 97.5 °F (36.4 °C)  Max: Temp  Av.6 °F (36.4 °C)  Min: 97.5 °F (36.4 °C)  Max: 98 °F (36.7 °C)  Respiration range:  Resp  Av.6  Min: 18  Max: 20  Pulse Range:  Pulse  Av.8  Min: 48  Max: 58  Blood pressure range:  Systolic (12BOO), SGB:577 , Min:101 , YGW:289   , Diastolic (81MPF), JRM:48, Min:64, Max:78    SpO2  Av.4 %  Min: 92 %  Max: 95 %  No intake or output data in the 24 hours ending 22 2251    Vent settings:  Pulse  Av.8  Min: 48  Max: 77  Resp  Av.6  Min: 16  Max: 33  SpO2  Av.3 %  Min: 86 %  Max: 97 %    CONSTITUTIONAL:  awake, alert, cooperative, no apparent distress, and appears stated age  EYES:  Unremarkable   NECK:  No JVD  and supple, symmetrical, trachea midline  BACK:  symmetric and no curvature  LUNGS:  No increased work of breathing, good air exchange, clear to auscultation bilaterally, no crackles or wheezing  CARDIOVASCULAR:  Normal apical impulse, regular rate and rhythm, normal S1 and S2, no S3 or S4, and no murmur noted  ABDOMEN:  Soft BS + non tender   MUSCULOSKELETAL:  Trace edema   NEUROLOGIC:  Grossly intact   SKIN:  Warm dry and pale  and no bruising or bleeding    Data      Recent Results (from the past 96 hour(s))   EKG 12 Lead    Collection Time: 01/17/22  3:05 PM   Result Value Ref Range    Ventricular Rate 73 BPM    Atrial Rate 73 BPM    P-R Interval 146 ms    QRS Duration 78 ms    Q-T Interval 388 ms    QTc Calculation (Bazett) 427 ms    P Axis -7 degrees    R Axis -26 degrees    T Axis 4 degrees    Diagnosis       Sinus rhythm with Premature atrial complexesMinimal voltage criteria for LVH, may be normal variantInferior infarct , age undeterminedCannot rule out Anterior infarct , age undeterminedAbnormal ECGConfirmed by Brenda Green MD, Ponce Sanchez ([id number]) on 1/17/2022 3:46:11 PM   COVID-19    Collection Time: 01/17/22  3:17 PM   Result Value Ref Range    SARS-CoV-2 Detected (A) Not detected   Culture, Blood 2    Collection Time: 01/17/22  3:21 PM    Specimen: Blood   Result Value Ref Range    Culture, Blood 2       No Growth to date. Any change in status will be called. Culture, Blood 1    Collection Time: 01/17/22  3:21 PM    Specimen: Blood   Result Value Ref Range    Blood Culture, Routine       No Growth to date. Any change in status will be called.    Blood gas, venous    Collection Time: 01/17/22  3:22 PM   Result Value Ref Range    pH, Rojelio 7.451 (H) 7.350 - 7.450    pCO2, Rojelio 30.6 (L) 40.0 - 50.0 mmHg    pO2, Rojelio 163.0 (H) 25.0 - 40.0 mmHg    HCO3, Venous 21.3 (L) 23.0 - 29.0 mmol/L    Base Excess, Rojelio -1.7 -3.0 - 3.0 mmol/L    O2 Sat, Rojelio 100 Not Established %    Carboxyhemoglobin 4.6 (H) 0.0 - 1.5 %    MetHgb, Rojelio <0.0 <1.5 %    TC02 (Calc), Rojelio 50 Not Established mmol/L    O2 Content, Rojelio 19 Not Established VOL %    O2 Therapy Unknown    Procalcitonin    Collection Time: 01/17/22  3:22 PM   Result Value Ref Range    Procalcitonin 0.11 0.00 - 0.15 ng/mL   Lactic Acid, Plasma    Collection Time: 01/17/22  3:22 PM   Result Value Ref Range    Lactic Acid 1.6 0.4 - 2.0 mmol/L   CBC Auto Differential    Collection Time: 01/17/22  3:22 PM   Result Value Ref Range    WBC 9.8 4.0 - 11.0 K/uL    RBC 5.66 4.20 - 5.90 M/uL    Hemoglobin 13.1 (L) 13.5 - 17.5 g/dL    Hematocrit 40.2 (L) 40.5 - 52.5 %    MCV 71.1 (L) 80.0 - 100.0 fL    MCH 23.1 (L) 26.0 - 34.0 pg    MCHC 32.5 31.0 - 36.0 g/dL    RDW 17.2 (H) 12.4 - 15.4 %    Platelets 905 947 - 781 K/uL    MPV 7.7 5.0 - 10.5 fL    Neutrophils % 89.1 %    Lymphocytes % 4.2 %    Monocytes % 5.5 %    Eosinophils % 0.6 %    Basophils % 0.6 %    Neutrophils Absolute 8.7 (H) 1.7 - 7.7 K/uL    Lymphocytes Absolute 0.4 (L) 1.0 - 5.1 K/uL    Monocytes Absolute 0.5 0.0 - 1.3 K/uL    Eosinophils Absolute 0.1 0.0 - 0.6 K/uL    Basophils Absolute 0.1 0.0 - 0.2 K/uL   Comprehensive Metabolic Panel    Collection Time: 01/17/22  3:22 PM   Result Value Ref Range    Sodium 133 (L) 136 - 145 mmol/L    Potassium 3.8 3.5 - 5.1 mmol/L    Chloride 99 99 - 110 mmol/L    CO2 19 (L) 21 - 32 mmol/L    Anion Gap 15 3 - 16    Glucose 130 (H) 70 - 99 mg/dL    BUN 23 (H) 7 - 20 mg/dL    CREATININE 0.8 0.8 - 1.3 mg/dL    GFR Non-African American >60 >60    GFR African American >60 >60    Calcium 9.0 8.3 - 10.6 mg/dL    Total Protein 6.7 6.4 - 8.2 g/dL    Albumin 3.2 (L) 3.4 - 5.0 g/dL    Albumin/Globulin Ratio 0.9 (L) 1.1 - 2.2    Total Bilirubin 0.8 0.0 - 1.0 mg/dL    Alkaline Phosphatase 91 40 - 129 U/L    ALT 21 10 - 40 U/L    AST 29 15 - 37 U/L   Troponin    Collection Time: 01/17/22  3:22 PM   Result Value Ref Range    Troponin <0.01 <0.01 ng/mL   APTT    Collection Time: 01/17/22  3:22 PM   Result Value Ref Range    aPTT 28.9 26.2 - 38.6 sec   Protime-INR    Collection Time: 01/17/22  3:22 PM   Result Value Ref Range    Protime 15.6 (H) 9.9 - 12.7 sec    INR 1.36 (H) 0.88 - 1.12   Brain Natriuretic Peptide    Collection Time: 01/17/22 3:22 PM   Result Value Ref Range    Pro- (H) 0 - 124 pg/mL   D-Dimer, Quantitative    Collection Time: 01/17/22  3:22 PM   Result Value Ref Range    D-Dimer, Quant 518 (H) 0 - 229 ng/mL DDU   Urinalysis Reflex to Culture    Collection Time: 01/17/22  3:30 PM    Specimen: Urine, clean catch   Result Value Ref Range    Color, UA YELLOW Straw/Yellow    Clarity, UA Clear Clear    Glucose, Ur Negative Negative mg/dL    Bilirubin Urine Negative Negative    Ketones, Urine Negative Negative mg/dL    Specific Gravity, UA >1.030 1.005 - 1.030    Blood, Urine SMALL (A) Negative    pH, UA 5.5 5.0 - 8.0    Protein,  (A) Negative mg/dL    Urobilinogen, Urine 1.0 <2.0 E.U./dL    Nitrite, Urine Negative Negative    Leukocyte Esterase, Urine Negative Negative    Microscopic Examination YES     Urine Type Voided     Urine Reflex to Culture Not Indicated    Microscopic Urinalysis    Collection Time: 01/17/22  3:30 PM   Result Value Ref Range    Hyaline Casts, UA 2 0 - 8 /LPF    WBC, UA 2 0 - 5 /HPF    RBC, UA 3 0 - 4 /HPF    Epithelial Cells, UA 1 0 - 5 /HPF   POCT Glucose    Collection Time: 01/18/22 12:41 AM   Result Value Ref Range    POC Glucose 174 (H) 70 - 99 mg/dl    Performed on ACCU-CHEK    POCT Glucose    Collection Time: 01/18/22  5:13 AM   Result Value Ref Range    POC Glucose 224 (H) 70 - 99 mg/dl    Performed on ACCU-CHEK    CBC    Collection Time: 01/18/22  5:29 AM   Result Value Ref Range    WBC 5.7 4.0 - 11.0 K/uL    RBC 5.67 4.20 - 5.90 M/uL    Hemoglobin 13.1 (L) 13.5 - 17.5 g/dL    Hematocrit 40.1 (L) 40.5 - 52.5 %    MCV 70.6 (L) 80.0 - 100.0 fL    MCH 23.0 (L) 26.0 - 34.0 pg    MCHC 32.6 31.0 - 36.0 g/dL    RDW 17.3 (H) 12.4 - 15.4 %    Platelets 854 924 - 717 K/uL    MPV 8.4 5.0 - 10.5 fL   Basic Metabolic Panel    Collection Time: 01/18/22  5:29 AM   Result Value Ref Range    Sodium 136 136 - 145 mmol/L    Potassium 4.0 3.5 - 5.1 mmol/L    Chloride 101 99 - 110 mmol/L    CO2 19 (L) 21 - 32 mmol/L Anion Gap 16 3 - 16    Glucose 234 (H) 70 - 99 mg/dL    BUN 30 (H) 7 - 20 mg/dL    CREATININE 0.9 0.8 - 1.3 mg/dL    GFR Non-African American >60 >60    GFR African American >60 >60    Calcium 9.3 8.3 - 10.6 mg/dL   POCT Glucose    Collection Time: 01/18/22  7:35 AM   Result Value Ref Range    POC Glucose 230 (H) 70 - 99 mg/dl    Performed on ACCU-CHEK    POCT Glucose    Collection Time: 01/18/22 11:36 AM   Result Value Ref Range    POC Glucose 266 (H) 70 - 99 mg/dl    Performed on ACCU-CHEK    POCT Glucose    Collection Time: 01/18/22  4:38 PM   Result Value Ref Range    POC Glucose 233 (H) 70 - 99 mg/dl    Performed on ACCU-CHEK    POCT Glucose    Collection Time: 01/18/22  8:39 PM   Result Value Ref Range    POC Glucose 194 (H) 70 - 99 mg/dl    Performed on ACCU-CHEK    D-dimer, quantitative    Collection Time: 01/19/22  5:15 AM   Result Value Ref Range    D-Dimer, Quant 361 (H) 0 - 229 ng/mL DDU   C-reactive protein    Collection Time: 01/19/22  5:15 AM   Result Value Ref Range    CRP 67.4 (H) 0.0 - 5.1 mg/L   POCT Glucose    Collection Time: 01/19/22  7:50 AM   Result Value Ref Range    POC Glucose 173 (H) 70 - 99 mg/dl    Performed on ACCU-CHEK    POCT Glucose    Collection Time: 01/19/22 12:03 PM   Result Value Ref Range    POC Glucose 217 (H) 70 - 99 mg/dl    Performed on ACCU-CHEK    POCT Glucose    Collection Time: 01/19/22  4:12 PM   Result Value Ref Range    POC Glucose 245 (H) 70 - 99 mg/dl    Performed on ACCU-CHEK    POCT Glucose    Collection Time: 01/19/22  8:50 PM   Result Value Ref Range    POC Glucose 151 (H) 70 - 99 mg/dl    Performed on ACCU-CHEK        ASSESSMENT AND PLAN     Active Problems:    Left ventricular hypertrophy    Mixed hyperlipidemia    Obesity    Essential hypertension, malignant    KARYNA (obstructive sleep apnea)    Restrictive lung disease    Chronic cough    Coronary artery disease involving native coronary artery of native heart with angina pectoris ([address]    Cardiomyopathy ([address]    COPD, severe ([address]    SOB (shortness of breath)    COPD with acute exacerbation ([address]    COVID-19 virus infection  Resolved Problems:    * No resolved hospital problems.  *    IV steroids    O2 sat not decompensating   Ct Bronchodilator  And ICS    may need home O2

## 2022-01-20 NOTE — PROGRESS NOTES
01/20/22 1139   Resting (Room Air)   SpO2 91   HR 75   Resting (On O2)   SpO2 95   O2 Device Nasal cannula   O2 Flow Rate (l/min) 2 l/min   During Walk (Room Air)   SpO2 88   HR 85   Walk/Assistance Device Ambulation   Rate of Dyspnea 1   During Walk (On O2)   SpO2 92   O2 Device Nasal cannula   O2 Flow Rate (l/min) 2 l/min   Need Additional O2 Flow Rate Rows No   Walk/Assistance Device Ambulation   Rate of Dyspnea 1   After Walk   SpO2 94   O2 Device Nasal cannula   O2 Flow Rate (l/min) 2 l/min   Rate of Dyspnea 0   Does the Patient Qualify for Home O2 Yes   Does the Patient Need Portable Oxygen Tanks Yes

## 2022-01-20 NOTE — CARE COORDINATION
[facility] received a referral to this patient for home 02 @ 2 lpm cont via nc. Home 02 has been arranged for delivery. Pt's family aware to call [facility] [phone] to initiate setup. Portable tank has been delivered to the hospital floor for discharge. Thank you for the referral.  Electronically signed by Krzysztof Crawford on 1/20/2022 at 1:38 PM  Cell ph# [phone]    NOTE: After 5:00 pm, Weekends, Holidays: Call [facility] at [phone] to coordinate delivery of home medical equipment.

## 2022-01-20 NOTE — PROGRESS NOTES
[facility] unable to accept this patient @ this time. [facility] called and can accept this patient for home care. Discharge planner notified.

## 2022-01-20 NOTE — CARE COORDINATION
Referral made to [address] [place] [address], [phone] or [phone]  for home oxygen. Will need an order with qualifying diagnosis, & Resp 02 SATs, within 24 hours of Discharge. The following is how the SATs should read:   RA at rest_____%   RA with Ambulation ____. ..

## 2022-01-20 NOTE — PLAN OF CARE
Pt is able to ambulate without assistance. Pt is maintaining oxygen saturation on 2L oxygen via nasal cannula. Pt remains free from falls & injuries. Pt uses call light appropriately. Pt remains able to express needs and remains involved in treatment plan. Problem: Falls - Risk of:  Goal: Will remain free from falls  Description: Will remain free from falls  Outcome: Ongoing  Goal: Absence of physical injury  Description: Absence of physical injury  Outcome: Ongoing     Problem: Airway Clearance - Ineffective  Goal: Achieve or maintain patent airway  Outcome: Ongoing     Problem: Gas Exchange - Impaired  Goal: Absence of hypoxia  Outcome: Ongoing  Goal: Promote optimal lung function  Outcome: Ongoing     Problem: Breathing Pattern - Ineffective  Goal: Ability to achieve and maintain a regular respiratory rate  Outcome: Ongoing     Problem:  Body Temperature -  Risk of, Imbalanced  Goal: Ability to maintain a body temperature within defined limits  Outcome: Ongoing  Goal: Will regain or maintain usual level of consciousness  Outcome: Ongoing  Goal: Complications related to the disease process, condition or treatment will be avoided or minimized  Outcome: Ongoing     Problem: Isolation Precautions - Risk of Spread of Infection  Goal: Prevent transmission of infection  Outcome: Ongoing     Problem: Nutrition Deficits  Goal: Optimize nutritional status  Outcome: Ongoing     Problem: Risk for Fluid Volume Deficit  Goal: Maintain normal heart rhythm  Outcome: Ongoing  Goal: Maintain absence of muscle cramping  Outcome: Ongoing  Goal: Maintain normal serum potassium, sodium, calcium, phosphorus, and pH  Outcome: Ongoing     Problem: Loneliness or Risk for Loneliness  Goal: Demonstrate positive use of time alone when socialization is not possible  Outcome: Ongoing     Problem: Fatigue  Goal: Verbalize increase energy and improved vitality  Outcome: Ongoing     Problem: Patient Education: Go to Patient Education Activity  Goal: Patient/Family Education  Outcome: Ongoing

## 2022-01-21 ENCOUNTER — CARE COORDINATION (OUTPATIENT)
Dept: CASE MANAGEMENT | Age: 72
End: 2022-01-21

## 2022-01-21 LAB
BLOOD CULTURE, ROUTINE: NORMAL
CULTURE, BLOOD 2: NORMAL

## 2022-01-21 NOTE — PROGRESS NOTES
Data- discharge order received, pt verbalized agreement to discharge, needs for [facility] with [facility] and home oxygen via [facility]. Action- AVS prepared, discharge instructions prepared and given to pt, medication information packet given r/t NEW or CHANGED prescriptions, pt verbalized understanding further self-review. D/C instruction summary: Diet- Carbohydrate controlled, Activity- as tolerated, follow up with Primary Care Physician MD Riccardo [phone] appointment to be scheduled for 1 week. Contact information provided to above agencies used. Pt will discuss insulin with PCP. Prefers not to take it. Instructions given for self quarantine for total of 10 days for Covid. Response- Case Management/ reported faxing completed DONTE and AVS to needed HHC/DME services stated above. Pt belongings gathered, IV removed, pt dressed himself. Disposition is home with HHC/DME as stated above, transported with portable, taken to ER lot via w/c with PCA, no complications. Pt driving himself home. 1. WEIGHT: Admit Weight: 276 lb (125.2 kg) (01/17/22 1459)        Today  Weight: 276 lb (125.2 kg) (01/17/22 1459)       2.  O2 SAT.: SpO2: 93 % (01/20/22 1304)

## 2022-01-21 NOTE — CARE COORDINATION
Transitions of Care Call:    (Wife will call PCP and schedule follow up.)    Patient reports that he has a productive cough, is on 2 lpm O2, and [facility] will be coming today. O2 saturations are in the 90's. Discussed discharge instructions and reviewed medications, he has all of his medications and is taking them as prescribed. CTN will resolve episode and remain available. Call within 2 business days of discharge: Yes    Patient: Carl Barr Patient : 1950   MRN: [id number]  Reason for Admission: COVID+; COPD  Discharge Date: 22 RARS: Readmission Risk Score: 16.1 ( )      Last Discharge [address] [id number]       Complaint Diagnosis Description Type Department Provider    22 Shortness of Breath Suspected COVID-19 virus infection . .. ED to Hosp-Admission (Discharged) (ADMITTED) Kenji Ortiz MD          Was this an external facility discharge? No Discharge Facility:     Challenges to be reviewed by the provider   Additional needs identified to be addressed with provider: No  none                 Encounter was not routed to provider for escalation. Method of communication with provider: none. Discussed COVID-19 related testing which was: available at this time. Test results were: positive. Patient informed of results, if available? Yes. Current Symptoms: cough    Reviewed New or Changed Meds: yes    Do you have what you need at home?  Durable Medical Equipment ordered at discharge: [facility]/Outpatient orders at discharge: home health care and none   Was patient discharged with a pulse oximeter? Yes Discussed and confirmed pulse oximeter discharge instructions and when to notify provider or seek emergency care. Patient education provided: Reviewed appropriate site of care based on symptoms and resources available to patient including: PCP, Urgent care clinics, Home health and When to call 911.      Follow up appointment scheduled within 7 days of discharge: no. If no appointment scheduled, scheduling offered: no  Future Appointments   Date Time Provider Alberto Bryanti   7/12/2022  2:45 PM Tiago Oneill MD [facility]       Interventions: Obtained and reviewed discharge summary and/or continuity of care documents  Reviewed discharge instructions, medical action plan and red flags with patient who verbalized understanding.     No further follow-up call indicated  José Miguel Kyle RN

## 2022-02-24 NOTE — DISCHARGE SUMMARY
[address]                     [facility], [address]                               DISCHARGE SUMMARY    PATIENT NAME: Aakash Oh                      :        1950  MED REC NO:   [id number]                          ROOM:       [id number]  ACCOUNT NO:   [de-identified]                           ADMIT DATE: 2022  PROVIDER:     Xin Burgess MD                  DISCHARGE DATE:  2022    FINAL DIAGNOSES:  1. Mixed hyperlipidemia. 2.  Left ventricular hypertrophy. 3.  Obesity. 4.  Essential hypertension. 5.  Obstructive sleep apnea. 6.  Restrictive lung disease. 7.  Chronic cough. 8.  Cardiomyopathy. 9.  COPD with acute exacerbation. 10.  COVID-19 disease. DISCHARGE MEDICATIONS:  1. Spiriva Respimat two puffs daily. 2.  Glutose gel p.r.n. for low blood sugar. 3.  Glargine 31 units nightly. 4.  Insulin lispro sliding scale and prandial coverage. 5.  Losartan 50 mg once a day. 6.  Prednisone 40 mg for three days, 30 mg for three days, 20 mg for  three days, and 10 mg for three days after that in that order. 7.  Vibramycin 100 mg twice a day. 8.  Daliresp 500 mg p.o. daily. 9.  Trelegy one puff into lungs every day. 10.  Nitrostat sublingual p.r.n. 11.  Toprol-XL 50 mg once a day. 12.  Proventil 2.5 mg nebulized aerosol as directed. 13.  Plavix 75 mg once a day. 14.  Excedrin Migraine as directed. 15.  Aldactone 25 mg daily. 16.  Aspirin 81 mg once a day. 17.  Doxazosin 4 mg nightly. 18.  Iron 325 daily. 19.  Prozac 20 mg once a day. HOSPITAL COURSE:  This elderly gentleman came to the emergency room with  history of known COVID-19 disease with a known COPD, came in with COPD  exacerbation, acute respiratory failure. Vital signs were relatively  stable. Lungs show bilateral crackles and wheezing. Sodium 136,  potassium 4.0, chloride 101, CO2 19, BUN 30, creatinine 0.9, anion gap  was 16, blood glucose 234.   White blood cell count 5.7,  hemoglobin/hematocrit was 13.1 and 40.1, platelet count _____. PT/INR  was 15.6 and 1.36. Blood cultures were negative. CT chest pulmonary  vasculature with contrast shows no evidence of pulmonary embolism. The  patient was treated with IV Solu-Medrol, DVT prophylaxis. General  condition was stabilized. Steroid was subsequently weaned. White blood  cell count came down to 11.4, hemoglobin and hematocrit 11.6 and 35.8  prior to discharge. Platelet count was 692. The patient was given DVT  prophylaxis. COVID-19 test was positive. PSA was 4.33.  CT scan of the  chest shows diffuse severe ground-glass opacification, COVID-19  pneumonia with COPD exacerbation was the classic finding. After 2-3  days of treatment, general condition improved. Oxygen requirements were  considerably lower. The patient was weaned off and the patient was  discharged in stable condition on oral medication. Necessary  arrangements were made. Tia Cantu made necessary arrangement for  home oxygen and home nebulizer treatment with home health care.         Julisa Humphrey MD    D: 02/23/2022 15:17:57       T: 02/24/2022 12:57:15     [email or web address]  Job#: [id number]     Doc#: [id number]    CC:

## 2022-03-11 ENCOUNTER — TELEPHONE (OUTPATIENT)
Dept: PULMONOLOGY | Age: 72
End: 2022-03-11

## 2022-03-11 DIAGNOSIS — R06.02 SOB (SHORTNESS OF BREATH): ICD-10-CM

## 2022-03-11 DIAGNOSIS — J44.9 COPD, SEVERE (HCC): Primary | ICD-10-CM

## 2022-03-11 RX ORDER — ALBUTEROL SULFATE 2.5 MG/3ML
SOLUTION RESPIRATORY (INHALATION)
Qty: 360 ML | Refills: 11 | Status: SHIPPED | OUTPATIENT
Start: 2022-03-11

## 2022-03-11 NOTE — TELEPHONE ENCOUNTER
Patient called requesting to have home oxygen d/c. Patient states he was sent home from hospital with it after he had Covid. SpO2 has been running 97-98%. He request to have note sent to [facility].

## 2022-03-14 ENCOUNTER — TELEPHONE (OUTPATIENT)
Dept: PULMONOLOGY | Age: 72
End: 2022-03-14

## 2022-03-14 RX ORDER — DOXYCYCLINE HYCLATE 100 MG
100 TABLET ORAL 2 TIMES DAILY
Qty: 20 TABLET | Refills: 0 | Status: SHIPPED | OUTPATIENT
Start: 2022-03-14 | End: 2022-03-24

## 2022-03-24 ENCOUNTER — OFFICE VISIT (OUTPATIENT)
Dept: PULMONOLOGY | Age: 72
End: 2022-03-24
Payer: MEDICARE

## 2022-03-24 VITALS
SYSTOLIC BLOOD PRESSURE: 131 MMHG | OXYGEN SATURATION: 96 % | HEART RATE: 69 BPM | WEIGHT: 280 LBS | DIASTOLIC BLOOD PRESSURE: 91 MMHG | BODY MASS INDEX: 39.05 KG/M2

## 2022-03-24 DIAGNOSIS — Z86.16 PERSONAL HISTORY OF COVID-19: ICD-10-CM

## 2022-03-24 DIAGNOSIS — J44.9 COPD, SEVERE (HCC): Primary | ICD-10-CM

## 2022-03-24 PROCEDURE — G8428 CUR MEDS NOT DOCUMENT: HCPCS | Performed by: NURSE PRACTITIONER

## 2022-03-24 PROCEDURE — 99213 OFFICE O/P EST LOW 20 MIN: CPT | Performed by: NURSE PRACTITIONER

## 2022-03-24 PROCEDURE — 3017F COLORECTAL CA SCREEN DOC REV: CPT | Performed by: NURSE PRACTITIONER

## 2022-03-24 PROCEDURE — G8417 CALC BMI ABV UP PARAM F/U: HCPCS | Performed by: NURSE PRACTITIONER

## 2022-03-24 PROCEDURE — 1123F ACP DISCUSS/DSCN MKR DOCD: CPT | Performed by: NURSE PRACTITIONER

## 2022-03-24 PROCEDURE — 4040F PNEUMOC VAC/ADMIN/RCVD: CPT | Performed by: NURSE PRACTITIONER

## 2022-03-24 PROCEDURE — 1036F TOBACCO NON-USER: CPT | Performed by: NURSE PRACTITIONER

## 2022-03-24 PROCEDURE — 3023F SPIROM DOC REV: CPT | Performed by: NURSE PRACTITIONER

## 2022-03-24 PROCEDURE — G8484 FLU IMMUNIZE NO ADMIN: HCPCS | Performed by: NURSE PRACTITIONER

## 2022-03-24 ASSESSMENT — ENCOUNTER SYMPTOMS
CONSTIPATION: 0
COUGH: 1
ABDOMINAL PAIN: 0
SHORTNESS OF BREATH: 1
COLOR CHANGE: 0

## 2022-03-24 NOTE — PROGRESS NOTES
[facility] Follow Up Note    Subjective:   CHIEF COMPLAINT / HPI: Severe COPD, chronic respiratory failure   The patient is 71 y.o. male who presents today for a routine follow up visit related to the above mentioned issues. There is PMH significant for other conditions including CAD, DM, dysphagia, cardiomyopathy and KARYNA not on PAP. He was last evaluated by Dr. Mack Mendoza in January. At that time breathing and cough was worse and he was prescribed Doxy / Prednisone and Anoro was transitioned to Trelegy. Unfortunately he was ultimately hospitalized in January for AECOPD and COVID-19. This was treated with IBD and systemic steroids. He required supplemental oxygen during his hospitalization and at discharge. Presently he reports his breathing is about 75% back to baseline since his COVID diagnosis. He notes intermittent cough which is occasionally productive of green-darwin mucous. This is unchanged from baseline. He denies fevers or chills. He was discharged on oxygen and would really like to get it out of his house. He hasn't used it for [employer]". He reports compliance with QD Trelegy, Daliresp and PRN SYMONE. He does not wear PAP.          Past Medical History:   Diagnosis Date    [facility] obstructive [facility] disease) [facility]     Coronary artery disease involving native coronary artery of native heart with angina pectoris ([facility] [facility] 1/18/2017    Depression     Diabetes mellitus ([facility] [facility]     type II    Dysphagia     Enlarged prostate     Heart enlarged     Hyperlipidemia     Hypertension     MRSA (methicillin resistant staph aureus) culture positive 08/17/2018    sputum    Obesity     Pneumonia     multiple times 2018    Sleep apnea     has cpap but hasn't used \"in a long time\"     Social History:    Social History     Tobacco Use   Smoking Status Former Smoker    Packs/day: 0.25    Years: 25.00    Pack years: 6.25    Types: Cigarettes, Pipe, Cigars    Quit date: 1/1/2011    Years since quittin.2   Smokeless Tobacco Former User   Tobacco Comment    per patient, has not smoked in 15 years     Current Medications:     Current Outpatient Medications on File Prior to Visit   Medication Sig Dispense Refill    doxycycline hyclate (VIBRA-TABS) 100 MG tablet Take 1 tablet by mouth 2 times daily for 10 days 20 tablet 0    albuterol (PROVENTIL) (2.5 MG/3ML) 0.083% nebulizer solution USE 1 VIAL PER NEBULIZER EVERY SIX HOURS AS NEEDED FOR WHEEZING 360 mL 11    tiotropium (SPIRIVA RESPIMAT) 2.5 MCG/ACT AERS inhaler Inhale 2 puffs into the lungs daily 1 each 0    glucose (GLUTOSE) 40 % GEL Take 37.5 mLs by mouth as needed (low BS) 45 g 1    insulin glargine (LANTUS;BASAGLAR) 100 UNIT/ML injection pen Inject 31 Units into the skin nightly 5 pen 3    insulin lispro, 1 Unit Dial, 100 UNIT/ML SOPN Inject 0-12 Units into the skin 3 times daily (with meals) 1 pen 0    insulin lispro, 1 Unit Dial, 100 UNIT/ML SOPN Inject 0-6 Units into the skin nightly 1 pen 0    losartan (COZAAR) 50 MG tablet Take 1 tablet by mouth daily 30 tablet 3    Roflumilast (DALIRESP) 500 MCG tablet TAKE 1 TABLET BY MOUTH DAILY 30 tablet 5    fluticasone-umeclidin-vilant (TRELEGY ELLIPTA) 100-62.5-25 MCG/INH AEPB Inhale 1 puff into the lungs daily 1 each 5    albuterol sulfate  (90 Base) MCG/ACT inhaler Inhale 2 puffs into the lungs every 4 hours as needed for Wheezing 6.7 g 11    nitroGLYCERIN (NITROSTAT) 0.4 MG SL tablet DISSOLVE 1 TABLET UNDER THE TONGUE EVERY 5 MINUTES AS NEEDED FOR CHEST PAIN 25 tablet 1    metoprolol succinate (TOPROL XL) 50 MG extended release tablet TAKE 1 TABLET BY MOUTH DAILY 90 tablet 3    clopidogrel (PLAVIX) 75 MG tablet TAKE 1 TABLET BY MOUTH DAILY 90 tablet 3    aspirin-acetaminophen-caffeine (EXCEDRIN MIGRAINE) 250-250-65 MG per tablet Take 1 tablet by mouth every 6 hours as needed for Headaches 90 tablet 3    spironolactone (ALDACTONE) 25 MG tablet TAKE 1 TABLET BY MOUTH DAILY 90 tablet 3    aspirin 81 MG EC tablet Take 81 mg by mouth daily      ferrous sulfate (IRON 325) 325 (65 Fe) MG tablet TAKE 1 TABLET BY MOUTH DAILY WITH BREAKFAST      FLUoxetine (PROZAC) 20 MG capsule TK 1 C PO D      doxazosin (CARDURA) 4 MG tablet Take 1 tablet by mouth nightly (Patient taking differently: Take 4 mg by mouth nightly PT STATES HASN'T TAKEN IN A FEW DAYS. INSTRUCTED HIM TO GET IT REFILLED) 90 tablet 2    clonazePAM (KLONOPIN) 1 MG tablet Take 1 mg by mouth 2 times daily as needed for Anxiety      metFORMIN (GLUCOPHAGE) 1000 MG tablet Take 1,000 mg by mouth 2 times daily (with meals)       simvastatin (ZOCOR) 40 MG tablet Take 0.5 tablets by mouth nightly 30 tablet 3     No current facility-administered medications on file prior to visit. Review of Systems   Constitutional: Negative for chills and fever. HENT: Negative for congestion and postnasal drip. Respiratory: Positive for cough and shortness of breath. Cardiovascular: Negative for chest pain and leg swelling. Gastrointestinal: Negative for abdominal pain and constipation. Musculoskeletal: Negative for arthralgias and joint swelling. Skin: Negative for color change and pallor. Allergic/Immunologic: Negative for environmental allergies and food allergies. Psychiatric/Behavioral: Negative for agitation and confusion. Objective:       VITALS:  BP (!) 131/91 (Site: Left Upper Arm, Position: Sitting, Cuff Size: Medium Adult)   Pulse 69   Wt 280 lb (127 kg)   SpO2 96%   BMI 39.05 kg/m²  on RA    Physical Exam  Constitutional:       General: He is not in acute distress. Appearance: He is well-developed. HENT:      Head: Normocephalic. Mouth/Throat:      Pharynx: No oropharyngeal exudate. Eyes:      General:         Right eye: No discharge. Left eye: No discharge. Conjunctiva/sclera: Conjunctivae normal.      Pupils: Pupils are equal, round, and reactive to light.    Neck: Trachea: No tracheal deviation. Cardiovascular:      Rate and Rhythm: Normal rate and regular rhythm. Heart sounds: No friction rub. Pulmonary:      Effort: Pulmonary effort is normal. No tachypnea, accessory muscle usage or respiratory distress. Breath sounds: No stridor. Rales present. No wheezing or rhonchi. Chest:      Chest wall: No tenderness or crepitus. Abdominal:      General: Bowel sounds are normal. There is no distension. Palpations: Abdomen is soft. Tenderness: There is no abdominal tenderness. Musculoskeletal:         General: Normal range of motion. Cervical back: Normal range of motion and neck supple. Lymphadenopathy:      Cervical: No cervical adenopathy. Skin:     General: Skin is warm and dry. Findings: No erythema. Neurological:      Mental Status: He is alert and oriented to person, place, and time. Psychiatric:         Thought Content: Thought content normal.       DATA:      Radiology Review:  Pertinent images / reports were reviewed as a part of this visit. CT chest done 2022 reveals the followin. Diffuse severe ground-glass infiltrates in the lungs. 2. No central or significant acute pulmonary embolism. Distal pulmonary arteries the poorly visualized and distal tiny embolism difficult to exclude. 3. Mild cardiomegaly. Last PFTs done 2019:  Spirometry reveals decreased FVC at 2.88 liters which  is 62% predicted. FEV1 is decreased to 2.04 liters which is 59%  predicted. FEV1/FVC ratio is at the lower limits of normal at 71%. There is a 15% improvement in FEV1 after inhaled bronchodilators. Lung  volumes reveal increased total lung capacity of 119% predicted. Residual volume is increased at 175% predicted. Diffusion capacity is  normal.     IMPRESSION:  Moderate obstructive lung disease with hyperinflation. Assessment / Plan:   1.  COPD, severe ([address]  - SOB improved since COVID but still worse than normal  - Continue Trelegy   - Increase SYMONE HHN to BID while breathing is worse than baseline, though suspect this is related to COVID related changes  - Some upper lobe crackles on exam    2. Personal history of COVID-19 & related respiratory failure   - Exercise oximetry done in the office today  - He maintain saturations on RA with activity, OK to stop O2 during the day  - Check overnight ox to determine if he still needs this with sleep, suspect he does   - CT chest showed diffuse GGO bilaterally   - Short term f/u in 3 months, if not back to baseline consider f/u CT / PFT    Return in about 3 months (around 6/24/2022). RTC sooner if symptoms worsen.     Ghada Leo MSN APRN-ACNP CCRN

## 2022-04-15 RX ORDER — CLOPIDOGREL BISULFATE 75 MG/1
75 TABLET ORAL DAILY
Qty: 90 TABLET | Refills: 0 | Status: SHIPPED | OUTPATIENT
Start: 2022-04-15 | End: 2022-07-14

## 2022-04-20 RX ORDER — LOSARTAN POTASSIUM 50 MG/1
50 TABLET ORAL DAILY
Qty: 30 TABLET | Refills: 3 | Status: SHIPPED | OUTPATIENT
Start: 2022-04-20 | End: 2022-08-01

## 2022-04-20 NOTE — TELEPHONE ENCOUNTER
Received refill request for Losartan 50mg from [address] : 06/30/2021 with [id number]    Last Labs : 01/20/2022 BMP    Last Refill : 01/21/2022 30 w/3 refills    Next OV : 04/27/2022 with DOMINIK

## 2022-04-20 NOTE — TELEPHONE ENCOUNTER
Medication Refill    Medication needing refilled:Losartan (COZAAR)     Dosage of the medication: 50 MG     How are you taking this medication (QD, BID, TID, QID, PRN):Take 1 tablet by mouth daily    30 or 90 day supply called in: 30    When will you run out of your medication: 5 days    Which Pharmacy are we sending the medication to?: [facility] [address] [place] [address], Yasmin Pinedo [id number]   Phone:  [phone]  Fax:  [phone]

## 2022-04-21 ENCOUNTER — TELEPHONE (OUTPATIENT)
Dept: PULMONOLOGY | Age: 72
End: 2022-04-21

## 2022-04-21 NOTE — TELEPHONE ENCOUNTER
Pt informed he needs to wear O2 at night 2 liters He stated he has stationary concentrator from [facility] already. .. He went on to say he would call them and ask for tubing.

## 2022-04-27 ENCOUNTER — OFFICE VISIT (OUTPATIENT)
Dept: CARDIOLOGY CLINIC | Age: 72
End: 2022-04-27
Payer: MEDICARE

## 2022-04-27 VITALS
BODY MASS INDEX: 39.84 KG/M2 | HEIGHT: 71 IN | DIASTOLIC BLOOD PRESSURE: 78 MMHG | WEIGHT: 284.6 LBS | HEART RATE: 66 BPM | OXYGEN SATURATION: 97 % | SYSTOLIC BLOOD PRESSURE: 134 MMHG

## 2022-04-27 DIAGNOSIS — I25.5 ISCHEMIC CARDIOMYOPATHY: ICD-10-CM

## 2022-04-27 DIAGNOSIS — I25.119 CORONARY ARTERY DISEASE INVOLVING NATIVE CORONARY ARTERY OF NATIVE HEART WITH ANGINA PECTORIS (HCC): Primary | ICD-10-CM

## 2022-04-27 DIAGNOSIS — E78.2 MIXED HYPERLIPIDEMIA: ICD-10-CM

## 2022-04-27 DIAGNOSIS — R06.02 SOB (SHORTNESS OF BREATH): ICD-10-CM

## 2022-04-27 DIAGNOSIS — I10 ESSENTIAL HYPERTENSION: ICD-10-CM

## 2022-04-27 PROCEDURE — 99214 OFFICE O/P EST MOD 30 MIN: CPT | Performed by: NURSE PRACTITIONER

## 2022-04-27 PROCEDURE — 1123F ACP DISCUSS/DSCN MKR DOCD: CPT | Performed by: NURSE PRACTITIONER

## 2022-04-27 PROCEDURE — 1036F TOBACCO NON-USER: CPT | Performed by: NURSE PRACTITIONER

## 2022-04-27 PROCEDURE — 4040F PNEUMOC VAC/ADMIN/RCVD: CPT | Performed by: NURSE PRACTITIONER

## 2022-04-27 PROCEDURE — 93000 ELECTROCARDIOGRAM COMPLETE: CPT | Performed by: NURSE PRACTITIONER

## 2022-04-27 PROCEDURE — G8427 DOCREV CUR MEDS BY ELIG CLIN: HCPCS | Performed by: NURSE PRACTITIONER

## 2022-04-27 PROCEDURE — G8417 CALC BMI ABV UP PARAM F/U: HCPCS | Performed by: NURSE PRACTITIONER

## 2022-04-27 PROCEDURE — 3017F COLORECTAL CA SCREEN DOC REV: CPT | Performed by: NURSE PRACTITIONER

## 2022-04-27 RX ORDER — NITROGLYCERIN 0.4 MG/1
TABLET SUBLINGUAL
Qty: 25 TABLET | Refills: 1 | Status: SHIPPED | OUTPATIENT
Start: 2022-04-27

## 2022-04-27 NOTE — PROGRESS NOTES
[address]   Cardiac Evaluation      Patient: Robin Mary  YOB: 1950      Chief Complaint   Patient presents with    Hyperlipidemia    1 Year Follow Up    Other     pressure with exertiion        Referring provider: Percy Anderson MD    History of Present Illness:   Mr Jenna Ford is seen today in follow up. He had been following with CNP, up until recently. Cardiac history includes CAD w/ cardiomyopathy, hyperlipidemia, and hypertension. He states he had a MI in 2017 with 2 stents at [address] and is now on Plavix. Today, Mr Jenna Ford says he is no longer falling or has palpitations. He says he hit his head a few months ago while getting into a car in [place] and has had terrible headaches ever since. His PCP has ordered an MRI and pain medications. Losartan was decreased from 100 to 50 due to dizziness and symptoms have improved. Mr Jenna Ford exercises and lifts weights when he can. Sometimes he feels like there is something pushing against his heart when he lifts weights but it is rare and he is asymptomatic when walking. Denies palpitations. Shortness of breath is stable. Past Medical History:   has a past medical history of COPD (chronic obstructive pulmonary disease) ([address] Coronary artery disease involving native coronary artery of native heart with angina pectoris ([address] Depression, Diabetes mellitus ([address] Dysphagia, Enlarged prostate, Heart enlarged, Hyperlipidemia, Hypertension, MRSA (methicillin resistant staph aureus) culture positive, Obesity, Pneumonia, and Sleep apnea. Surgical History:   has a past surgical history that includes Prostate surgery; Gallbladder surgery; Dental surgery; Coronary angioplasty (01/11/2017); Cholecystectomy; bronchoscopy (04/25/2018); cystoscopy w biopsy of bladder (N/A, 3/15/2021); TURP (N/A, 3/15/2021); and Cystoscopy (N/A, 4/14/2021).      Current Outpatient Medications   Medication Sig Dispense Refill    losartan (COZAAR) 50 MG tablet Take 1 tablet by mouth daily 30 tablet 3    clopidogrel (PLAVIX) 75 MG tablet TAKE 1 TABLET BY MOUTH DAILY 90 tablet 0    albuterol (PROVENTIL) (2.5 MG/3ML) 0.083% nebulizer solution USE 1 VIAL PER NEBULIZER EVERY SIX HOURS AS NEEDED FOR WHEEZING 360 mL 11    Roflumilast (DALIRESP) 500 MCG tablet TAKE 1 TABLET BY MOUTH DAILY 30 tablet 5    fluticasone-umeclidin-vilant (TRELEGY ELLIPTA) 100-62.5-25 MCG/INH AEPB Inhale 1 puff into the lungs daily 1 each 5    albuterol sulfate  (90 Base) MCG/ACT inhaler Inhale 2 puffs into the lungs every 4 hours as needed for Wheezing 6.7 g 11    nitroGLYCERIN (NITROSTAT) 0.4 MG SL tablet DISSOLVE 1 TABLET UNDER THE TONGUE EVERY 5 MINUTES AS NEEDED FOR CHEST PAIN 25 tablet 1    metoprolol succinate (TOPROL XL) 50 MG extended release tablet TAKE 1 TABLET BY MOUTH DAILY 90 tablet 3    spironolactone (ALDACTONE) 25 MG tablet TAKE 1 TABLET BY MOUTH DAILY 90 tablet 3    aspirin 81 MG EC tablet Take 81 mg by mouth daily      FLUoxetine (PROZAC) 20 MG capsule TK 1 C PO D      doxazosin (CARDURA) 4 MG tablet Take 1 tablet by mouth nightly (Patient taking differently: Take 4 mg by mouth nightly PT STATES HASN'T TAKEN IN A FEW DAYS.   INSTRUCTED HIM TO GET IT REFILLED) 90 tablet 2    clonazePAM (KLONOPIN) 1 MG tablet Take 1 mg by mouth 2 times daily as needed for Anxiety      metFORMIN (GLUCOPHAGE) 500 MG tablet Take 1,000 mg by mouth 2 times daily (with meals)       simvastatin (ZOCOR) 40 MG tablet Take 0.5 tablets by mouth nightly 30 tablet 3    tiotropium (SPIRIVA RESPIMAT) 2.5 MCG/ACT AERS inhaler Inhale 2 puffs into the lungs daily (Patient not taking: Reported on 4/27/2022) 1 each 0    glucose (GLUTOSE) 40 % GEL Take 37.5 mLs by mouth as needed (low BS) (Patient not taking: Reported on 4/27/2022) 45 g 1    insulin glargine (LANTUS;BASAGLAR) 100 UNIT/ML injection pen Inject 31 Units into the skin nightly (Patient not taking: Reported on 4/27/2022) 5 pen 3    insulin lispro, 1 Unit Dial, 100 UNIT/ML SOPN Inject 0-12 Units into the skin 3 times daily (with meals) (Patient not taking: Reported on 2022) 1 pen 0    insulin lispro, 1 Unit Dial, 100 UNIT/ML SOPN Inject 0-6 Units into the skin nightly (Patient not taking: Reported on 2022) 1 pen 0    aspirin-acetaminophen-caffeine (EXCEDRIN MIGRAINE) 250-250-65 MG per tablet Take 1 tablet by mouth every 6 hours as needed for Headaches (Patient not taking: Reported on 2022) 90 tablet 3    ferrous sulfate (IRON 325) 325 (65 Fe) MG tablet TAKE 1 TABLET BY MOUTH DAILY WITH BREAKFAST (Patient not taking: Reported on 2022)       No current facility-administered medications for this visit. Allergies:  Lisinopril     Social History:  Social History     Socioeconomic History    Marital status: Single     Spouse name: Not on file    Number of children: Not on file    Years of education: Not on file    Highest education level: Not on file   Occupational History    Not on file   Tobacco Use    Smoking status: Former Smoker     Packs/day: 0.25     Years: 25.00     Pack years: 6.25     Types: Cigarettes, Pipe, Cigars     Quit date: 2011     Years since quittin.3    Smokeless tobacco: Former User    Tobacco comment: per patient, has not smoked in 15 years   Vaping Use    Vaping Use: Never used   Substance and Sexual Activity    Alcohol use: Not Currently     Comment: per patient, has not drank in 15 years    Drug use: No    Sexual activity: Not on file   Other Topics Concern    Not on file   Social History Narrative    Not on file     Social Determinants of Health     Financial Resource Strain:     Difficulty of Paying Living Expenses: Not on file   Food Insecurity:     Worried About [address] in the Last Year: Not on file    [address] in the Last Year: Not on file   Transportation Needs:     Lack of Transportation (Medical):  Not on file    Lack of Transportation (Non-Medical): Not on file   Physical Activity:     Days of Exercise per Week: Not on file    Minutes of Exercise per Session: Not on file   Stress:     Feeling of Stress : Not on file   Social Connections:     Frequency of Communication with Friends and Family: Not on file    Frequency of Social Gatherings with Friends and Family: Not on file    Attends Denominational Services: Not on file    Active Member of [address] [place] [address] or Organizations: Not on file    Attends Club or Organization Meetings: Not on file    Marital Status: Not on file   Intimate Partner Violence:     Fear of Current or Ex-Partner: Not on file    Emotionally Abused: Not on file    Physically Abused: Not on file    Sexually Abused: Not on file   Housing Stability:     Unable to Pay for Housing in the Last Year: Not on file    Number of [place] in the Last Year: Not on file    Unstable Housing in the Last Year: Not on file       Family History:   Family History   Problem Relation Age of Onset    COPD Mother     Heart Disease Father     COPD Father      Family history has been reviewed and not pertinent except as noted above. Review of Systems:   · Constitutional: there has been no unanticipated weight loss. No change in energy or activity level   · Eyes: No visual changes   · ENT: No Headaches, hearing loss or vertigo. No mouth sores or sore throat. · Cardiovascular: Reviewed in HPI  · Respiratory: No cough or wheezing, no sputum production. · Gastrointestinal: No abdominal pain, appetite loss, blood in stools. No change in bowel or bladder habits. · Genitourinary: No nocturia, dysuria, trouble voiding  · Musculoskeletal:  No gait disturbance, weakness or joint complaints. · Integumentary: No rash or pruritis. · Neurological: No headache, change in muscle strength, numbness or tingling. No change in gait, balance, coordination, mood, affect, memory, mentation, behavior.   · Psychiatric: No anxiety or depression  · Endocrine: No malaise or fever  · Hematologic/Lymphatic: No abnormal bruising or bleeding, blood clots or swollen lymph nodes. · Allergic/Immunologic: No nasal congestion or hives. Physical Examination:    Vitals:    04/27/22 0949 04/27/22 0950   BP: (!) 152/94 (!) 150/94   Site: Left Upper Arm Left Upper Arm   Position: Sitting Sitting   Cuff Size: Medium Adult Medium Adult   Pulse: 66    SpO2: 97%    Weight: 284 lb 9.6 oz (129.1 kg)    Height: 5' 11\" (1.803 m)      Body mass index is 39.69 kg/m². Wt Readings from Last 3 Encounters:   04/27/22 284 lb 9.6 oz (129.1 kg)   03/24/22 280 lb (127 kg)   01/17/22 276 lb (125.2 kg)      BP Readings from Last 3 Encounters:   04/27/22 134/78   03/24/22 (!) 131/91   01/20/22 111/73        Physical Examination:    · CONSTITUTIONAL: Well developed, well nourished, obese, multiple tattoos  · EYES: PERRLA. No xanthelasma, sclera non icteric  · EARS,NOSE,MOUTH,THROAT:  Mucous membranes moist, normal hearing  · NECK: Supple, JVP normal, thyroid not enlarged. Carotids 2+ without bruits  · RESPIRATORY: Diminished bases  · CARDIOVASCULAR: Normal PMI, regular rate and rhythm, no murmurs, rub or gallop. Trace edema BLE. Radial pulses present and equal  · CHEST: No scar or masses  · ABDOMEN: Normal bowel sounds. No masses or tenderness. No bruit  · MUSCULOSKELETAL: No clubbing or cyanosis. Moves all extremities well. Normal gait  · SKIN:  Warm and dry. No rashes  · NEUROLOGIC: Cranial nerves intact. Alert and oriented  · PSYCHIATRIC: Calm affect. Appears to have normal judgement and insight    Assessment/Plan  1. Coronary artery disease involving native coronary artery of native heart with angina pectoris ([address]   ~Cath 1/13/17: ([address]) LM: MLI, PROX LAD: 100% occluded, MID LAD: 40-50%, DISTAL LAD: 95%, EF 30-35%. Single vessel coronary artery disease with a 100% proximal LAD and 95% distal LAD.  Successful overlapping 4.0 x 38 and 4.0 x 16 synergy stents to the proximal LAD  Successful POBA of the distal LAD with a 2.0 balloon  ~Nuclear stress test (9/11/8):marked LV enlargement with anterior wall akinesis. EF 41%. Moderate sized anterior fixed defect of moderate intensity consistent with infarction in the territory of the mid and distal LAD.  ~On DAPT/BB/Statin  ~Stable   2. Essential hypertension, malignant - stable  ~On cardura, Toprol XL, Cozaar   3. Mixed hyperlipidemia   ~ Lipids (12/3/19): ; TRIG 85; HDL 30; LDL 54  ~ stable on zocor 40   4. Ischemic cardiomyopathy   ~Echo 7/10/20: Left ventricular cavity size is mildly dilated. Mild concentric left ventricular hypertrophy. Overall, left ventricular systolic function appears mildly reduced with EF 45-50% range. Cannot exclude regional wall motion abnormalities secondary to poor endocardial visualization. Diastolic filling parameters suggest grade I diastolic dysfunction. Trivial mitral regurgitation. The right ventricle is normal in size and function. Unable to estimate pulmonary artery pressure secondary to incomplete/absent TR jet envelope  ~EF 41% on GXT 9/2018   5. Dizzy - PCP decreased losartan from 100 to 50 and symptoms improved       PLAN:  Check lipids. Encouraged continued exercise. RTO in 6 months      Thank you for allowing to me to participate in the care of Kenya Gresham.

## 2022-06-14 RX ORDER — SPIRONOLACTONE 25 MG/1
25 TABLET ORAL DAILY
Qty: 90 TABLET | Refills: 3 | Status: SHIPPED | OUTPATIENT
Start: 2022-06-14

## 2022-07-12 ENCOUNTER — OFFICE VISIT (OUTPATIENT)
Dept: PULMONOLOGY | Age: 72
End: 2022-07-12
Payer: MEDICARE

## 2022-07-12 VITALS — HEART RATE: 68 BPM | OXYGEN SATURATION: 97 %

## 2022-07-12 DIAGNOSIS — I25.5 ISCHEMIC CARDIOMYOPATHY: ICD-10-CM

## 2022-07-12 DIAGNOSIS — I25.119 CORONARY ARTERY DISEASE INVOLVING NATIVE CORONARY ARTERY OF NATIVE HEART WITH ANGINA PECTORIS (HCC): ICD-10-CM

## 2022-07-12 DIAGNOSIS — J44.9 COPD, SEVERE (HCC): ICD-10-CM

## 2022-07-12 DIAGNOSIS — R06.02 SOB (SHORTNESS OF BREATH): Primary | ICD-10-CM

## 2022-07-12 DIAGNOSIS — R05.3 CHRONIC COUGH: ICD-10-CM

## 2022-07-12 PROCEDURE — 99214 OFFICE O/P EST MOD 30 MIN: CPT | Performed by: INTERNAL MEDICINE

## 2022-07-12 PROCEDURE — 3023F SPIROM DOC REV: CPT | Performed by: INTERNAL MEDICINE

## 2022-07-12 PROCEDURE — 3017F COLORECTAL CA SCREEN DOC REV: CPT | Performed by: INTERNAL MEDICINE

## 2022-07-12 PROCEDURE — G8417 CALC BMI ABV UP PARAM F/U: HCPCS | Performed by: INTERNAL MEDICINE

## 2022-07-12 PROCEDURE — G8427 DOCREV CUR MEDS BY ELIG CLIN: HCPCS | Performed by: INTERNAL MEDICINE

## 2022-07-12 PROCEDURE — 1036F TOBACCO NON-USER: CPT | Performed by: INTERNAL MEDICINE

## 2022-07-12 PROCEDURE — 1123F ACP DISCUSS/DSCN MKR DOCD: CPT | Performed by: INTERNAL MEDICINE

## 2022-07-12 RX ORDER — ALBUTEROL SULFATE 90 UG/1
2 AEROSOL, METERED RESPIRATORY (INHALATION) EVERY 6 HOURS PRN
Qty: 18 G | Refills: 11 | Status: SHIPPED | OUTPATIENT
Start: 2022-07-12 | End: 2023-07-12

## 2022-07-12 RX ORDER — ALBUTEROL SULFATE 2.5 MG/3ML
2.5 SOLUTION RESPIRATORY (INHALATION) EVERY 6 HOURS PRN
Qty: 360 ML | Refills: 11 | Status: SHIPPED | OUTPATIENT
Start: 2022-07-12

## 2022-07-12 RX ORDER — FLUTICASONE FUROATE, UMECLIDINIUM BROMIDE AND VILANTEROL TRIFENATATE 100; 62.5; 25 UG/1; UG/1; UG/1
1 POWDER RESPIRATORY (INHALATION) DAILY
Qty: 1 EACH | Refills: 6 | Status: SHIPPED | OUTPATIENT
Start: 2022-07-12

## 2022-07-12 NOTE — PROGRESS NOTES
[facility]  Progress Note  Yumiko Verde MD           Colby Marquez   YOB: 1950    Date of Visit:  7/12/2022    Assessment/Plan:  1. Shortness of breath  Worse  Has rattleing in his chest  Finished Abx and steroids and symptoms returned  CXR and labs at [facility] were negative  COVID negative and he is vaccinated    2. Chronic cough/Hemoptysis  Green sputum    3. COPD, severe ([address]  PFT 9/19:  Stable  INTERPRETATION:  Spirometry reveals decreased FVC at 2.88 liters which  is 62% predicted. FEV1 is decreased to 2.04 liters which is 59%  predicted. FEV1/FVC ratio is at the lower limits of normal at 71%. There is a 15% improvement in FEV1 after inhaled bronchodilators. Lung  volumes reveal increased total lung capacity of 119% predicted. Residual volume is increased at 175% predicted. Diffusion capacity is  normal.     IMPRESSION:  Moderate obstructive lung disease with hyperinflation. Medication Management:  The patient benefits from the medical regimen and reports no complications. Continue:  Anoro ==> Trelegy (this has been a lot better)  Albuterol HFA/HHN  Daliresp ==> Resume this which he ran out of    Repeat PFT is worse than previous when FEV1 was 2.28 L    4. Cardiomyopathy ([address]  LVEF 30%  Followed by cardiology    5. Coronary artery disease involving native coronary artery of native heart with angina pectoris ([address]  [facility] 1/17    6. Essential hypertension, benign  BP is up today    He has had his flu shot. He has had his COVID vaccine    FOLLOW UP: 6 months      Chief Complaint   Patient presents with    Shortness of Breath     6 mnonth Wearing his O2 at night        HPI  The patient presents with a chief complaint of severe shortness of breath related to moderate to severe COPD of many years duration. He has mild associated cough. He was ion ER last week with SOB. He has some green sputum.  He took a steroid taper and that 37.5 mLs by mouth as needed (low BS)  Patient not taking: Reported on 4/27/2022  Marsha Mack MD   insulin glargine (LANTUS;BASAGLAR) 100 UNIT/ML injection pen Inject 31 Units into the skin nightly  Patient not taking: Reported on 4/27/2022  Marsha Mack MD   insulin lispro, 1 Unit Dial, 100 UNIT/ML SOPN Inject 0-12 Units into the skin 3 times daily (with meals)  Patient not taking: Reported on 4/27/2022  Marsha Mack MD   insulin lispro, 1 Unit Dial, 100 UNIT/ML SOPN Inject 0-6 Units into the skin nightly  Patient not taking: Reported on 4/27/2022  Marsha Mack MD   Roflumilast (DALIRESP) 500 MCG tablet TAKE 1 TABLET BY MOUTH DAILY  Erinn Su MD   fluticasone-umeclidin-vilant (TRELEGY ELLIPTA) 100-62.5-25 MCG/INH AEPB Inhale 1 puff into the lungs daily  Erinn Su MD   albuterol sulfate  (90 Base) MCG/ACT inhaler Inhale 2 puffs into the lungs every 4 hours as needed for Wheezing  Erinn Su MD   metoprolol succinate (TOPROL XL) 50 MG extended release tablet TAKE 1 TABLET BY MOUTH DAILY  Arnav New MD   aspirin-acetaminophen-caffeine (EXCEDRIN MIGRAINE) 575-389-73 MG per tablet Take 1 tablet by mouth every 6 hours as needed for Headaches  Patient not taking: Reported on 4/27/2022  Marsha Mack MD   aspirin 81 MG EC tablet Take 81 mg by mouth daily  Historical Provider, MD   ferrous sulfate (IRON 325) 325 (65 Fe) MG tablet TAKE 1 TABLET BY MOUTH DAILY WITH BREAKFAST  Patient not taking: Reported on 4/27/2022  Historical Provider, MD   FLUoxetine (PROZAC) 20 MG capsule TK 1 C PO D  Historical Provider, MD   doxazosin (CARDURA) 4 MG tablet Take 1 tablet by mouth nightly  Patient taking differently: Take 4 mg by mouth nightly PT STATES HASN'T TAKEN IN A FEW DAYS.   INSTRUCTED HIM TO GET IT REFILLED  ALEXEI Desir - CNP   clonazePAM (KLONOPIN) 1 MG tablet Take 1 mg by mouth 2 times daily as needed for Anxiety  Historical Provider, MD   metFORMIN (GLUCOPHAGE) 500 MG tablet Take 1,000 mg by mouth 2 times daily (with meals)   Historical Provider, MD   simvastatin (ZOCOR) 40 MG tablet Take 0.5 tablets by mouth nightly  ALEXEI Hall - CNP       Vitals:    22 1422   Pulse: 68   SpO2: 97%     There is no height or weight on file to calculate BMI.      Wt Readings from Last 3 Encounters:   22 284 lb 9.6 oz (129.1 kg)   22 280 lb (127 kg)   22 276 lb (125.2 kg)     BP Readings from Last 3 Encounters:   22 134/78   22 (!) 131/91   22 111/73        Social History     Tobacco Use   Smoking Status Former Smoker    Packs/day: 0.25    Years: 25.00    Pack years: 6.25    Types: Cigarettes, Pipe, Cigars    Quit date: 2011    Years since quittin.5   Smokeless Tobacco Former User   Tobacco Comment    per patient, has not smoked in 15 years

## 2022-07-14 RX ORDER — METOPROLOL SUCCINATE 50 MG/1
50 TABLET, EXTENDED RELEASE ORAL DAILY
Qty: 90 TABLET | Refills: 3 | Status: SHIPPED | OUTPATIENT
Start: 2022-07-14

## 2022-07-14 RX ORDER — CLOPIDOGREL BISULFATE 75 MG/1
75 TABLET ORAL DAILY
Qty: 90 TABLET | Refills: 3 | Status: SHIPPED | OUTPATIENT
Start: 2022-07-14

## 2022-08-01 RX ORDER — LOSARTAN POTASSIUM 50 MG/1
50 TABLET ORAL DAILY
Qty: 90 TABLET | Refills: 2 | Status: SHIPPED | OUTPATIENT
Start: 2022-08-01 | End: 2022-08-16

## 2022-08-16 RX ORDER — LOSARTAN POTASSIUM 50 MG/1
50 TABLET ORAL DAILY
Qty: 90 TABLET | Refills: 2 | Status: SHIPPED | OUTPATIENT
Start: 2022-08-16

## 2023-01-09 ENCOUNTER — TELEPHONE (OUTPATIENT)
Dept: PULMONOLOGY | Age: 73
End: 2023-01-09

## 2023-01-09 NOTE — TELEPHONE ENCOUNTER
For the past couple of days pt has had cough with green phlegm, wheezing and congested.     [facility] on [facility].    Ph # [phone]

## 2023-01-25 ENCOUNTER — TELEPHONE (OUTPATIENT)
Dept: CARDIOLOGY CLINIC | Age: 73
End: 2023-01-25

## 2023-01-30 NOTE — PROGRESS NOTES
[address]   Cardiac Evaluation      Patient: Dain Shelby  YOB: 1950      Chief Complaint   Patient presents with    Cardiomyopathy    Coronary Artery Disease    Hyperlipidemia    Hypertension          Referring provider: Riya Leal MD    History of Present Illness:   Mr Ervin Botello is seen today in follow up and cardiac clearance for colonoscopy. Cardiac history includes CAD w/ cardiomyopathy, hyperlipidemia, and hypertension. He states he had a MI in 2017 with 2 stents at [facility] and is now on Plavix. He was seen in the ER on 1/10/23 for chest pain. Today, Mr Ervin Botello states when he went to ER he was having chest pain with radiating back pain. He states the pain was similar to MI in 2017, but not as severe. Irving Jerry states he has not had anymore chest pain. For exercise, he walks around stores or outside weather permitting. He denies WALLS, dizziness, or edema. Irving Jerry is seeing Dr Martina Alejandro for anemia and is going to have colonoscopy. Past Medical History:   has a past medical history of COPD (chronic obstructive pulmonary disease) ([address] Coronary artery disease involving native coronary artery of native heart with angina pectoris ([address] Depression, Diabetes mellitus ([address] Dysphagia, Enlarged prostate, Heart enlarged, Hyperlipidemia, Hypertension, MRSA (methicillin resistant staph aureus) culture positive, Obesity, Pneumonia, and Sleep apnea. Surgical History:   has a past surgical history that includes Prostate surgery; Gallbladder surgery; Dental surgery; Coronary angioplasty (01/11/2017); Cholecystectomy; bronchoscopy (04/25/2018); cystoscopy w biopsy of bladder (N/A, 3/15/2021); TURP (N/A, 3/15/2021); and Cystoscopy (N/A, 4/14/2021).      Current Outpatient Medications   Medication Sig Dispense Refill    losartan (COZAAR) 50 MG tablet TAKE 1 TABLET BY MOUTH DAILY 90 tablet 2    clopidogrel (PLAVIX) 75 MG tablet TAKE 1 TABLET BY MOUTH DAILY 90 tablet 3    metoprolol succinate (TOPROL XL) 50 MG extended release tablet Take 1 tablet by mouth daily 90 tablet 3    fluticasone-umeclidin-vilant (TRELEGY ELLIPTA) 100-62.5-25 MCG/INH AEPB Inhale 1 puff into the lungs daily 1 each 6    Roflumilast (DALIRESP) 500 MCG tablet Take 1 tablet by mouth daily 30 tablet 11    spironolactone (ALDACTONE) 25 MG tablet Take 1 tablet by mouth daily 90 tablet 3    aspirin 81 MG EC tablet Take 81 mg by mouth daily      ferrous sulfate (IRON 325) 325 (65 Fe) MG tablet TAKE 1 TABLET BY MOUTH DAILY WITH BREAKFAST      FLUoxetine (PROZAC) 20 MG capsule TK 1 C PO D      doxazosin (CARDURA) 4 MG tablet Take 1 tablet by mouth nightly (Patient taking differently: Take 4 mg by mouth nightly PT STATES HASN'T TAKEN IN A FEW DAYS. INSTRUCTED HIM TO GET IT REFILLED) 90 tablet 2    clonazePAM (KLONOPIN) 1 MG tablet Take 1 mg by mouth 2 times daily as needed for Anxiety      metFORMIN (GLUCOPHAGE) 500 MG tablet Take 500 mg by mouth 2 times daily (with meals)      simvastatin (ZOCOR) 40 MG tablet Take 0.5 tablets by mouth nightly 30 tablet 3    albuterol sulfate HFA (PROVENTIL;VENTOLIN;PROAIR) 108 (90 Base) MCG/ACT inhaler Inhale 2 puffs into the lungs every 6 hours as needed for Wheezing 18 g 11    albuterol (PROVENTIL) (2.5 MG/3ML) 0.083% nebulizer solution Take 3 mLs by nebulization every 6 hours as needed for Wheezing DX:COPD J44.9 360 mL 11    nitroGLYCERIN (NITROSTAT) 0.4 MG SL tablet DISSOLVE 1 TABLET UNDER THE TONGUE EVERY 5 MINUTES AS NEEDED FOR CHEST PAIN 25 tablet 1    glucose (GLUTOSE) 40 % GEL Take 37.5 mLs by mouth as needed (low BS) (Patient not taking: Reported on 4/27/2022) 45 g 1    albuterol sulfate  (90 Base) MCG/ACT inhaler Inhale 2 puffs into the lungs every 4 hours as needed for Wheezing 6.7 g 11     No current facility-administered medications for this visit.        Allergies:  Lisinopril     Social History:  Social History     Socioeconomic History    Marital status: Single     Spouse name: Not on file    Number of children: Not on file    Years of education: Not on file    Highest education level: Not on file   Occupational History    Not on file   Tobacco Use    Smoking status: Former     Packs/day: 0.25     Years: 25.00     Pack years: 6.25     Types: Cigarettes, Pipe, Cigars     Quit date: 2011     Years since quittin.0    Smokeless tobacco: Former    Tobacco comments:     per patient, has not smoked in 15 years   Vaping Use    Vaping Use: Never used   Substance and Sexual Activity    Alcohol use: Not Currently     Comment: per patient, has not drank in 15 years    Drug use: No    Sexual activity: Not on file   Other Topics Concern    Not on file   Social History Narrative    Not on file     Social Determinants of Health     Financial Resource Strain: Not on file   Food Insecurity: Not on file   Transportation Needs: Not on file   Physical Activity: Not on file   Stress: Not on file   Social Connections: Not on file   Intimate Partner Violence: Not on file   Housing Stability: Not on file       Family History:   Family History   Problem Relation Age of Onset    COPD Mother     Heart Disease Father     COPD Father      Family history has been reviewed and not pertinent except as noted above. Review of Systems:   Constitutional: there has been no unanticipated weight loss. No change in energy or activity level   Eyes: No visual changes   ENT: No Headaches, hearing loss or vertigo. No mouth sores or sore throat. Cardiovascular: Reviewed in HPI  Respiratory: No cough or wheezing, no sputum production. Gastrointestinal: No abdominal pain, appetite loss, blood in stools. No change in bowel or bladder habits. Genitourinary: No nocturia, dysuria, trouble voiding  Musculoskeletal:  No gait disturbance, weakness or joint complaints. Integumentary: No rash or pruritis. Neurological: No headache, change in muscle strength, numbness or tingling.  No change in gait, balance, coordination, mood, affect, memory, mentation, behavior. Psychiatric: No anxiety or depression  Endocrine: No malaise or fever  Hematologic/Lymphatic: No abnormal bruising or bleeding, blood clots or swollen lymph nodes. Allergic/Immunologic: No nasal congestion or hives. Physical Examination:    Vitals:    02/01/23 1016   BP: 132/70   Site: Left Upper Arm   Position: Sitting   Cuff Size: Large Adult   Pulse: 63   SpO2: 97%   Weight: 280 lb (127 kg)   Height: 5' 11\" (1.803 m)       Body mass index is 39.05 kg/m². Wt Readings from Last 3 Encounters:   02/01/23 280 lb (127 kg)   04/27/22 284 lb 9.6 oz (129.1 kg)   03/24/22 280 lb (127 kg)      BP Readings from Last 3 Encounters:   02/01/23 132/70   04/27/22 134/78   03/24/22 (!) 131/91        Physical Examination:    CONSTITUTIONAL: Well developed, well nourished, obese, multiple tattoos  EYES: PERRLA. No xanthelasma, sclera non icteric  EARS,NOSE,MOUTH,THROAT:  Mucous membranes moist, normal hearing  NECK: Supple, JVP normal, thyroid not enlarged. Carotids 2+ without bruits  RESPIRATORY: Diminished bases  CARDIOVASCULAR: Normal PMI, regular rate and rhythm, no murmurs, rub or gallop. Trace edema BLE. Radial pulses present and equal  CHEST: No scar or masses  ABDOMEN: Normal bowel sounds. No masses or tenderness. No bruit  MUSCULOSKELETAL: No clubbing or cyanosis. Moves all extremities well. Normal gait  SKIN:  Warm and dry. No rashes  NEUROLOGIC: Cranial nerves intact. Alert and oriented  PSYCHIATRIC: Calm affect. Appears to have normal judgement and insight    Assessment/Plan  1. Coronary artery disease involving native coronary artery of native heart with angina pectoris ([facility] [address]   ~Cath 1/13/17: ([address]) LM: MLI, PROX LAD: 100% occluded, MID LAD: 40-50%, DISTAL LAD: 95%, EF 30-35%. Single vessel coronary artery disease with a 100% proximal LAD and 95% distal LAD.  Successful overlapping 4.0 x 38 and 4.0 x 16 synergy stents to the proximal LAD  Successful POBA of the distal LAD with a 2.0 balloon  ~Nuclear stress test (9/11/8):marked LV enlargement with anterior wall akinesis. EF 41%. Moderate sized anterior fixed defect of moderate intensity consistent with infarction in the territory of the mid and distal LAD.  ~On DAPT/BB/Statin  ~Stable   2. Essential hypertension, malignant - stable   ~On cardura, Toprol XL, Cozaar   3. Mixed hyperlipidemia   ~ Lipids 5/5/22:  ; TRIG 70; HDL 30; LDL 63 ~ stable on zocor 40   4. Ischemic cardiomyopathy   ~Echo 7/10/20: Left ventricular cavity size is mildly dilated. Mild concentric left ventricular hypertrophy. Overall, left ventricular systolic function appears mildly reduced with EF 45-50% range. Cannot exclude regional wall motion abnormalities secondary to poor endocardial visualization. Diastolic filling parameters suggest grade I diastolic dysfunction. Trivial mitral regurgitation. The right ventricle is normal in size and function. Unable to estimate pulmonary artery pressure secondary to incomplete/absent TR jet envelope  ~EF 41% on GXT 9/2018   5. Dizzy - PCP decreased losartan from 100 to 50 and symptoms improved       PLAN:  Will obtain myoview stress test prior to colonoscopy because of his recent ER visit for chest pain. Advised to hold Toprol the night before stress test.     Scribe's attestation: This note was scribed in the presence of Dr Naseem Galeano MD by Jose Juan Craig RN. The scribe's documentation has been prepared under my direction and personally reviewed by me in its entirety. I confirm that the note above accurately reflects all work, treatment, procedures, and medical decision making performed by me. Thank you for allowing to me to participate in the care of Mariaelena Vance.

## 2023-02-01 ENCOUNTER — OFFICE VISIT (OUTPATIENT)
Dept: CARDIOLOGY CLINIC | Age: 73
End: 2023-02-01
Payer: MEDICARE

## 2023-02-01 VITALS
SYSTOLIC BLOOD PRESSURE: 132 MMHG | HEART RATE: 63 BPM | BODY MASS INDEX: 39.2 KG/M2 | HEIGHT: 71 IN | OXYGEN SATURATION: 97 % | DIASTOLIC BLOOD PRESSURE: 70 MMHG | WEIGHT: 280 LBS

## 2023-02-01 DIAGNOSIS — E78.2 MIXED HYPERLIPIDEMIA: ICD-10-CM

## 2023-02-01 DIAGNOSIS — R07.89 OTHER CHEST PAIN: ICD-10-CM

## 2023-02-01 DIAGNOSIS — I25.119 CORONARY ARTERY DISEASE INVOLVING NATIVE CORONARY ARTERY OF NATIVE HEART WITH ANGINA PECTORIS (HCC): Primary | ICD-10-CM

## 2023-02-01 DIAGNOSIS — I10 ESSENTIAL HYPERTENSION: ICD-10-CM

## 2023-02-01 DIAGNOSIS — I25.5 ISCHEMIC CARDIOMYOPATHY: ICD-10-CM

## 2023-02-01 PROCEDURE — G8427 DOCREV CUR MEDS BY ELIG CLIN: HCPCS | Performed by: INTERNAL MEDICINE

## 2023-02-01 PROCEDURE — G8484 FLU IMMUNIZE NO ADMIN: HCPCS | Performed by: INTERNAL MEDICINE

## 2023-02-01 PROCEDURE — 99214 OFFICE O/P EST MOD 30 MIN: CPT | Performed by: INTERNAL MEDICINE

## 2023-02-01 PROCEDURE — 1123F ACP DISCUSS/DSCN MKR DOCD: CPT | Performed by: INTERNAL MEDICINE

## 2023-02-01 PROCEDURE — 3075F SYST BP GE 130 - 139MM HG: CPT | Performed by: INTERNAL MEDICINE

## 2023-02-01 PROCEDURE — 1036F TOBACCO NON-USER: CPT | Performed by: INTERNAL MEDICINE

## 2023-02-01 PROCEDURE — 3017F COLORECTAL CA SCREEN DOC REV: CPT | Performed by: INTERNAL MEDICINE

## 2023-02-01 PROCEDURE — G8417 CALC BMI ABV UP PARAM F/U: HCPCS | Performed by: INTERNAL MEDICINE

## 2023-02-01 PROCEDURE — 93000 ELECTROCARDIOGRAM COMPLETE: CPT | Performed by: INTERNAL MEDICINE

## 2023-02-01 PROCEDURE — 3078F DIAST BP <80 MM HG: CPT | Performed by: INTERNAL MEDICINE

## 2023-02-06 RX ORDER — FLUTICASONE FUROATE, UMECLIDINIUM BROMIDE AND VILANTEROL TRIFENATATE 100; 62.5; 25 UG/1; UG/1; UG/1
POWDER RESPIRATORY (INHALATION)
Qty: 60 EACH | Refills: 3 | Status: SHIPPED | OUTPATIENT
Start: 2023-02-06

## 2023-02-08 ENCOUNTER — TELEPHONE (OUTPATIENT)
Dept: PULMONOLOGY | Age: 73
End: 2023-02-08

## 2023-02-08 RX ORDER — NITROGLYCERIN 0.4 MG/1
TABLET SUBLINGUAL
Qty: 25 TABLET | Refills: 1 | Status: SHIPPED | OUTPATIENT
Start: 2023-02-08

## 2023-02-08 NOTE — TELEPHONE ENCOUNTER
Left message O2 supplies is thru [employer] and left their # and nebulizer is thru [address] and left their number a well

## 2023-02-08 NOTE — TELEPHONE ENCOUNTER
PT called and needs new hose for nebulizer and nighttime oxygen.      [facility]: [phone]

## 2023-02-13 ENCOUNTER — HOSPITAL ENCOUNTER (OUTPATIENT)
Dept: NON INVASIVE DIAGNOSTICS | Age: 73
Discharge: HOME | End: 2023-02-13
Payer: MEDICARE

## 2023-02-13 DIAGNOSIS — R07.89 OTHER CHEST PAIN: ICD-10-CM

## 2023-02-13 LAB
LV EF: 48 %
LVEF MODALITY: NORMAL

## 2023-02-13 PROCEDURE — 3430000000 HC RX DIAGNOSTIC RADIOPHARMACEUTICAL: Performed by: INTERNAL MEDICINE

## 2023-02-13 PROCEDURE — A9502 TC99M TETROFOSMIN: HCPCS | Performed by: INTERNAL MEDICINE

## 2023-02-13 PROCEDURE — 78452 HT MUSCLE IMAGE SPECT MULT: CPT | Performed by: INTERNAL MEDICINE

## 2023-02-13 PROCEDURE — 93017 CV STRESS TEST TRACING ONLY: CPT | Performed by: INTERNAL MEDICINE

## 2023-02-13 RX ADMIN — TETROFOSMIN 10 MILLICURIE: 1.38 INJECTION, POWDER, LYOPHILIZED, FOR SOLUTION INTRAVENOUS at 07:44

## 2023-02-13 RX ADMIN — TETROFOSMIN 30 MILLICURIE: 1.38 INJECTION, POWDER, LYOPHILIZED, FOR SOLUTION INTRAVENOUS at 08:53

## 2023-02-13 NOTE — PROGRESS NOTES
Patient instructed on Danny Protocol Stress Test Procedure including possible side effects and adverse reactions. Verbalizes knowledge and understanding and denies having any questions.

## 2023-02-28 ENCOUNTER — OFFICE VISIT (OUTPATIENT)
Dept: PULMONOLOGY | Age: 73
End: 2023-02-28
Payer: MEDICARE

## 2023-02-28 VITALS — HEART RATE: 66 BPM | OXYGEN SATURATION: 96 %

## 2023-02-28 DIAGNOSIS — I25.119 CORONARY ARTERY DISEASE INVOLVING NATIVE CORONARY ARTERY OF NATIVE HEART WITH ANGINA PECTORIS (HCC): ICD-10-CM

## 2023-02-28 DIAGNOSIS — I25.5 ISCHEMIC CARDIOMYOPATHY: ICD-10-CM

## 2023-02-28 DIAGNOSIS — J44.9 COPD, SEVERE (HCC): Primary | ICD-10-CM

## 2023-02-28 DIAGNOSIS — G47.33 OSA (OBSTRUCTIVE SLEEP APNEA): ICD-10-CM

## 2023-02-28 PROCEDURE — 99214 OFFICE O/P EST MOD 30 MIN: CPT | Performed by: INTERNAL MEDICINE

## 2023-02-28 PROCEDURE — G8427 DOCREV CUR MEDS BY ELIG CLIN: HCPCS | Performed by: INTERNAL MEDICINE

## 2023-02-28 PROCEDURE — 3023F SPIROM DOC REV: CPT | Performed by: INTERNAL MEDICINE

## 2023-02-28 PROCEDURE — 1036F TOBACCO NON-USER: CPT | Performed by: INTERNAL MEDICINE

## 2023-02-28 PROCEDURE — 3017F COLORECTAL CA SCREEN DOC REV: CPT | Performed by: INTERNAL MEDICINE

## 2023-02-28 PROCEDURE — G8417 CALC BMI ABV UP PARAM F/U: HCPCS | Performed by: INTERNAL MEDICINE

## 2023-02-28 PROCEDURE — 1123F ACP DISCUSS/DSCN MKR DOCD: CPT | Performed by: INTERNAL MEDICINE

## 2023-02-28 PROCEDURE — G8484 FLU IMMUNIZE NO ADMIN: HCPCS | Performed by: INTERNAL MEDICINE

## 2023-02-28 NOTE — PROGRESS NOTES
[facility]  Progress Note  Ronda Cortes MD           Betzy Nelson   YOB: 1950    Date of Visit:  2/28/2023    Assessment/Plan:  1. Shortness of breath  Stable  No recent flare ups    2. Chronic cough/Hemoptysis  Stable    3. COPD, severe ([address]  PFT 9/19:  Stable  INTERPRETATION:  Spirometry reveals decreased FVC at 2.88 liters which  is 62% predicted. FEV1 is decreased to 2.04 liters which is 59%  predicted. FEV1/FVC ratio is at the lower limits of normal at 71%. There is a 15% improvement in FEV1 after inhaled bronchodilators. Lung  volumes reveal increased total lung capacity of 119% predicted. Residual volume is increased at 175% predicted. Diffusion capacity is  normal.     IMPRESSION:  Moderate obstructive lung disease with hyperinflation. Medication Management:  The patient benefits from the medical regimen and reports no complications. Continue:  Anoro ==> Trelegy (this has been a lot better)  Albuterol HFA/HHN  Daliresp    Repeat PFT is worse than previous when FEV1 was 2.28 L    4. Cardiomyopathy ([address]  LVEF 30%  Followed by cardiology    5. Coronary artery disease involving native coronary artery of native heart with angina pectoris ([address]  Two stents at [facility] 1/17    6. Essential hypertension, benign  BP is up today    He has had his flu shot. He has had his COVID vaccine    FOLLOW UP: 6 months      Chief Complaint   Patient presents with    Shortness of Breath     6 month        HPI  The patient presents with a chief complaint of severe shortness of breath related to moderate to severe COPD of many years duration. He has mild associated cough. He was ion ER last week with SOB. He has some green sputum. He took a steroid taper and that helped but symptoms returned when the medication finished. He also had an Abx. He ran out of his Daliresp.     Review of Systems  No Chest pain, Nausea or vomiting reported      Physical Exam:    Well developed, well nourished  Alert and oriented  Sclera is clear  No cervical adenopathy  No JVD. Chest examination is clear. Cardiac examination reveals regular rate and rhythm without murmur, gallop or rub. The abdomen is soft, nontender and nondistended. There is no clubbing, cyanosis or edema of the extremities. There is no obvious skin rash. No focal neuro deficicts  Normal mood and affect      Allergies   Allergen Reactions    Lisinopril Other (See Comments)     COUGH     Prior to Visit Medications    Medication Sig Taking?  Authorizing Provider   nitroGLYCERIN (NITROSTAT) 0.4 MG SL tablet DISSOLVE 1 TABLET UNDER THE TONGUE EVERY 5 MINUTES AS NEEDED FOR CHEST PAIN  MD Gt Haney Shereen 100-62.5-25 MCG/ACT AEPB inhaler INHALE 1 PUFF INTO THE LUNGS DAILY  Jeannine Coreas MD   losartan (COZAAR) 50 MG tablet TAKE 1 TABLET BY MOUTH DAILY  ALEXEI Yusuf CNP   clopidogrel (PLAVIX) 75 MG tablet TAKE 1 TABLET BY MOUTH DAILY  ALEXEI Yusuf CNP   metoprolol succinate (TOPROL XL) 50 MG extended release tablet Take 1 tablet by mouth daily  ALEXEI Yusuf CNP   Roflumilast (DALIRESP) 500 MCG tablet Take 1 tablet by mouth daily  Jeannine Coreas MD   albuterol sulfate HFA (PROVENTIL;VENTOLIN;PROAIR) 108 (90 Base) MCG/ACT inhaler Inhale 2 puffs into the lungs every 6 hours as needed for Wheezing  Jeannine Coreas MD   albuterol (PROVENTIL) (2.5 MG/3ML) 0.083% nebulizer solution Take 3 mLs by nebulization every 6 hours as needed for Wheezing DX:COPD J44.9  Jeannine Coreas MD   spironolactone (ALDACTONE) 25 MG tablet Take 1 tablet by mouth daily  ALEXEI Yusuf CNP   glucose (GLUTOSE) 40 % GEL Take 37.5 mLs by mouth as needed (low BS)  Patient not taking: Reported on 4/27/2022  Lizz Haynes MD   albuterol sulfate  (90 Base) MCG/ACT inhaler Inhale 2 puffs into the lungs every 4 hours as needed for Wheezing  Jeannine Coreas MD   aspirin 81 MG EC tablet Take 81 mg by mouth daily  Historical Provider, MD   ferrous sulfate (IRON 325) 325 (65 Fe) MG tablet TAKE 1 TABLET BY MOUTH DAILY WITH BREAKFAST  Historical Provider, MD   FLUoxetine (PROZAC) 20 MG capsule TK 1 C PO D  Historical Provider, MD   doxazosin (CARDURA) 4 MG tablet Take 1 tablet by mouth nightly  Patient taking differently: Take 4 mg by mouth nightly PT STATES HASN'T TAKEN IN A FEW DAYS.  INSTRUCTED HIM TO GET IT REFILLED  ALEXEI Cao - CNP   clonazePAM (KLONOPIN) 1 MG tablet Take 1 mg by mouth 2 times daily as needed for Anxiety  Historical Provider, MD   metFORMIN (GLUCOPHAGE) 500 MG tablet Take 500 mg by mouth 2 times daily (with meals)  Historical Provider, MD   simvastatin (ZOCOR) 40 MG tablet Take 0.5 tablets by mouth nightly  ALEXEI Brown - CNP       Vitals:    23 1124   Pulse: 66   SpO2: 96%     There is no height or weight on file to calculate BMI.     Wt Readings from Last 3 Encounters:   23 280 lb (127 kg)   22 284 lb 9.6 oz (129.1 kg)   22 280 lb (127 kg)     BP Readings from Last 3 Encounters:   23 132/70   22 134/78   22 (!) 131/91        Social History     Tobacco Use   Smoking Status Former    Packs/day: 0.25    Years: 25.00    Pack years: 6.25    Types: Cigarettes, Pipe, Cigars    Quit date: 2011    Years since quittin.1   Smokeless Tobacco Former   Tobacco Comments    per patient, has not smoked in 15 years

## 2023-03-13 RX ORDER — ROFLUMILAST 500 UG/1
500 TABLET ORAL DAILY
Qty: 30 TABLET | Refills: 11 | OUTPATIENT
Start: 2023-03-13

## 2023-04-19 ENCOUNTER — OFFICE VISIT (OUTPATIENT)
Dept: CARDIOLOGY CLINIC | Age: 73
End: 2023-04-19

## 2023-04-19 VITALS
SYSTOLIC BLOOD PRESSURE: 138 MMHG | DIASTOLIC BLOOD PRESSURE: 84 MMHG | HEART RATE: 64 BPM | WEIGHT: 279.4 LBS | HEIGHT: 71 IN | BODY MASS INDEX: 39.11 KG/M2 | OXYGEN SATURATION: 94 %

## 2023-04-19 DIAGNOSIS — G47.33 OSA (OBSTRUCTIVE SLEEP APNEA): ICD-10-CM

## 2023-04-19 DIAGNOSIS — I25.5 ISCHEMIC CARDIOMYOPATHY: ICD-10-CM

## 2023-04-19 DIAGNOSIS — E78.2 MIXED HYPERLIPIDEMIA: ICD-10-CM

## 2023-04-19 DIAGNOSIS — I25.119 CORONARY ARTERY DISEASE INVOLVING NATIVE CORONARY ARTERY OF NATIVE HEART WITH ANGINA PECTORIS (HCC): Primary | ICD-10-CM

## 2023-04-19 DIAGNOSIS — I10 ESSENTIAL HYPERTENSION: ICD-10-CM

## 2023-04-19 RX ORDER — LOSARTAN POTASSIUM 50 MG/1
50 TABLET ORAL DAILY
Qty: 90 TABLET | Refills: 2 | Status: SHIPPED | OUTPATIENT
Start: 2023-04-19

## 2023-04-19 RX ORDER — DOXAZOSIN MESYLATE 4 MG/1
4 TABLET ORAL NIGHTLY
Qty: 90 TABLET | Refills: 2 | Status: SHIPPED | OUTPATIENT
Start: 2023-04-19

## 2023-04-19 NOTE — PROGRESS NOTES
[facility]   Cardiac Evaluation      Patient: Yomi Cosme  YOB: 1950      Chief Complaint   Patient presents with    Coronary Artery Disease     No cardiac symptoms at this time    1 Year Follow Up          Referring provider: Johnnie Mejias MD    History of Present Illness:   Mr Shawnee Coffman is seen today in follow up and cardiac clearance for colonoscopy. Cardiac history includes CAD w/ cardiomyopathy, hyperlipidemia, and hypertension. He states he had a MI in 2017 with 2 stents at [facility] and is now on Plavix. He was seen in the ER on 1/10/23 for chest pain. Today, Mr Shawnee Coffman says he stays tired all the time and wakes up frequently at night. He is not using CPAP and wants a different a mask. Denies any chest pain, palpitations, dizziness, or edema. He complains of chronic shortness of breath related to COPD. Mr Shawnee Coffman stays busy taking care of his wife, cooking, and fishing. He plans on increasing his activity with warmer weather. Past Medical History:   has a past medical history of COPD (chronic obstructive pulmonary disease) ([address] Coronary artery disease involving native coronary artery of native heart with angina pectoris ([address] Depression, Diabetes mellitus ([address] Dysphagia, Enlarged prostate, Heart enlarged, Hyperlipidemia, Hypertension, MRSA (methicillin resistant staph aureus) culture positive, Obesity, Pneumonia, and Sleep apnea. Surgical History:   has a past surgical history that includes Prostate surgery; Gallbladder surgery; Dental surgery; Coronary angioplasty (01/11/2017); Cholecystectomy; bronchoscopy (04/25/2018); cystoscopy w biopsy of bladder (N/A, 3/15/2021); TURP (N/A, 3/15/2021); and Cystoscopy (N/A, 4/14/2021).      Current Outpatient Medications   Medication Sig Dispense Refill    nitroGLYCERIN (NITROSTAT) 0.4 MG SL tablet DISSOLVE 1 TABLET UNDER THE TONGUE EVERY 5 MINUTES AS NEEDED FOR CHEST PAIN 25 tablet 1    TRELEGY ELLIPTA 100-62.5-25 MCG/ACT AEPB

## 2023-05-17 ENCOUNTER — TELEPHONE (OUTPATIENT)
Dept: CARDIOLOGY CLINIC | Age: 73
End: 2023-05-17

## 2023-05-17 NOTE — TELEPHONE ENCOUNTER
Yassine Perezrow called stating he is having chest pain with left arm numbness/tingling. I spoke w/ Dr Verena Castillo and Yassine Landry is advised to go to nearest ER or call 911. He states he does not have someone to drive him to the hospital so he will call 911.

## 2023-06-01 RX ORDER — FLUTICASONE FUROATE, UMECLIDINIUM BROMIDE AND VILANTEROL TRIFENATATE 100; 62.5; 25 UG/1; UG/1; UG/1
POWDER RESPIRATORY (INHALATION)
Qty: 60 EACH | Refills: 3 | Status: SHIPPED | OUTPATIENT
Start: 2023-06-01

## 2023-06-01 RX ORDER — FLUTICASONE FUROATE, UMECLIDINIUM BROMIDE AND VILANTEROL TRIFENATATE 100; 62.5; 25 UG/1; UG/1; UG/1
POWDER RESPIRATORY (INHALATION)
Qty: 60 EACH | Refills: 3 | OUTPATIENT
Start: 2023-06-01

## 2023-06-13 PROBLEM — M19.012 PRIMARY OSTEOARTHRITIS OF LEFT SHOULDER: Status: ACTIVE | Noted: 2023-06-13

## 2023-06-19 ENCOUNTER — TELEPHONE (OUTPATIENT)
Dept: CARDIOLOGY CLINIC | Age: 73
End: 2023-06-19

## 2023-07-06 RX ORDER — ROFLUMILAST 500 UG/1
500 TABLET ORAL DAILY
Qty: 30 TABLET | Refills: 11 | Status: SHIPPED | OUTPATIENT
Start: 2023-07-06

## 2023-07-11 RX ORDER — METOPROLOL SUCCINATE 50 MG/1
50 TABLET, EXTENDED RELEASE ORAL DAILY
Qty: 90 TABLET | Refills: 3 | Status: SHIPPED | OUTPATIENT
Start: 2023-07-11

## 2023-07-11 RX ORDER — CLOPIDOGREL BISULFATE 75 MG/1
75 TABLET ORAL DAILY
Qty: 90 TABLET | Refills: 3 | Status: SHIPPED | OUTPATIENT
Start: 2023-07-11

## 2023-07-24 NOTE — TELEPHONE ENCOUNTER
Received refill request for nitro from [facility] pharmacy.     Last ov: 02/01/2023 DAH    Last Refill: 02/08/2023     Next appointment: 08/02/2023 [address] [place] [address]

## 2023-07-25 RX ORDER — NITROGLYCERIN 0.4 MG/1
TABLET SUBLINGUAL
Qty: 25 TABLET | Refills: 1 | Status: SHIPPED | OUTPATIENT
Start: 2023-07-25

## 2023-07-27 ENCOUNTER — TELEPHONE (OUTPATIENT)
Dept: PULMONOLOGY | Age: 73
End: 2023-07-27

## 2023-07-27 NOTE — TELEPHONE ENCOUNTER
Pt called and needs refills on:    Lizette Delaney 100-62.5-25 MCG/ACT AEPB inhaler       Send to:    [address] [id number] [facility],   [facility] [address],[facility] [address]   Phone:  [phone]    Fax:  [phone]

## 2023-07-28 RX ORDER — FLUTICASONE FUROATE, UMECLIDINIUM BROMIDE AND VILANTEROL TRIFENATATE 100; 62.5; 25 UG/1; UG/1; UG/1
POWDER RESPIRATORY (INHALATION)
Qty: 60 EACH | Refills: 3 | Status: SHIPPED | OUTPATIENT
Start: 2023-07-28

## 2023-08-01 NOTE — PROGRESS NOTES
territory of the mid and distal LAD.  ~On DAPT/BB/Statin  ~Stable   2. Essential hypertension - well controlled   ~On cardura, Toprol XL, Cozaar   3. Mixed hyperlipidemia   ~ Lipids 5/30/23:  ; TRIG 69; HDL 32; LDL 76 ~ stable on zocor 40   4. Ischemic cardiomyopathy   ~Echo 7/10/20: Left ventricular cavity size is mildly dilated. Mild concentric left ventricular hypertrophy. Overall, left ventricular systolic function appears mildly reduced with EF 45-50% range. Cannot exclude regional wall motion abnormalities secondary to poor endocardial visualization. Diastolic filling parameters suggest grade I diastolic dysfunction. Trivial mitral regurgitation. The right ventricle is normal in size and function. Unable to estimate pulmonary artery pressure secondary to incomplete/absent TR jet envelope  ~EF 41% on GXT 9/2018  ~on Toprol / spironolactone / losartan    5. Dizzy - PCP decreased losartan from 100 to 50 and symptoms improved   6. KARYNA - noncompliant     PLAN:  will order cervical spine x-rays due to left arm pain which has persisted after left shoulder injection. Stable cardiac status. No med changes. FU 6 months. Scribe's attestation: This note was scribed in the presence of Dr Jason Springer MD by Jordana Hayden RN. The scribe's documentation has been prepared under my direction and personally reviewed by me in its entirety. I confirm that the note above accurately reflects all work, treatment, procedures, and medical decision making performed by me. Thank you for allowing to me to participate in the care of Cee Mcgowan.

## 2023-08-02 ENCOUNTER — OFFICE VISIT (OUTPATIENT)
Dept: CARDIOLOGY CLINIC | Age: 73
End: 2023-08-02
Payer: MEDICARE

## 2023-08-02 VITALS
SYSTOLIC BLOOD PRESSURE: 110 MMHG | BODY MASS INDEX: 38.08 KG/M2 | HEIGHT: 71 IN | HEART RATE: 62 BPM | DIASTOLIC BLOOD PRESSURE: 60 MMHG | OXYGEN SATURATION: 95 % | WEIGHT: 272 LBS

## 2023-08-02 DIAGNOSIS — I10 ESSENTIAL HYPERTENSION: ICD-10-CM

## 2023-08-02 DIAGNOSIS — R20.2 NUMBNESS AND TINGLING IN LEFT HAND: ICD-10-CM

## 2023-08-02 DIAGNOSIS — E78.2 MIXED HYPERLIPIDEMIA: ICD-10-CM

## 2023-08-02 DIAGNOSIS — R20.0 NUMBNESS AND TINGLING IN LEFT HAND: ICD-10-CM

## 2023-08-02 DIAGNOSIS — I25.119 CORONARY ARTERY DISEASE INVOLVING NATIVE CORONARY ARTERY OF NATIVE HEART WITH ANGINA PECTORIS (HCC): Primary | ICD-10-CM

## 2023-08-02 DIAGNOSIS — G47.33 OSA (OBSTRUCTIVE SLEEP APNEA): ICD-10-CM

## 2023-08-02 PROCEDURE — 3074F SYST BP LT 130 MM HG: CPT | Performed by: INTERNAL MEDICINE

## 2023-08-02 PROCEDURE — 99214 OFFICE O/P EST MOD 30 MIN: CPT | Performed by: INTERNAL MEDICINE

## 2023-08-02 PROCEDURE — 1123F ACP DISCUSS/DSCN MKR DOCD: CPT | Performed by: INTERNAL MEDICINE

## 2023-08-02 PROCEDURE — 3078F DIAST BP <80 MM HG: CPT | Performed by: INTERNAL MEDICINE

## 2023-08-02 PROCEDURE — G8417 CALC BMI ABV UP PARAM F/U: HCPCS | Performed by: INTERNAL MEDICINE

## 2023-08-02 PROCEDURE — G8427 DOCREV CUR MEDS BY ELIG CLIN: HCPCS | Performed by: INTERNAL MEDICINE

## 2023-08-02 PROCEDURE — 1036F TOBACCO NON-USER: CPT | Performed by: INTERNAL MEDICINE

## 2023-08-02 PROCEDURE — 3017F COLORECTAL CA SCREEN DOC REV: CPT | Performed by: INTERNAL MEDICINE

## 2023-08-02 RX ORDER — FINASTERIDE 5 MG/1
5 TABLET, FILM COATED ORAL DAILY
COMMUNITY
Start: 2023-05-10

## 2023-08-28 ENCOUNTER — OFFICE VISIT (OUTPATIENT)
Dept: PULMONOLOGY | Age: 73
End: 2023-08-28
Payer: MEDICARE

## 2023-08-28 VITALS
WEIGHT: 270 LBS | HEIGHT: 71 IN | HEART RATE: 74 BPM | BODY MASS INDEX: 37.8 KG/M2 | DIASTOLIC BLOOD PRESSURE: 78 MMHG | SYSTOLIC BLOOD PRESSURE: 116 MMHG | OXYGEN SATURATION: 96 %

## 2023-08-28 DIAGNOSIS — I25.119 CORONARY ARTERY DISEASE INVOLVING NATIVE CORONARY ARTERY OF NATIVE HEART WITH ANGINA PECTORIS (HCC): ICD-10-CM

## 2023-08-28 DIAGNOSIS — J44.9 COPD, SEVERE (HCC): Primary | ICD-10-CM

## 2023-08-28 DIAGNOSIS — I25.5 ISCHEMIC CARDIOMYOPATHY: ICD-10-CM

## 2023-08-28 DIAGNOSIS — J96.11 CHRONIC HYPOXEMIC RESPIRATORY FAILURE (HCC): ICD-10-CM

## 2023-08-28 PROCEDURE — G8427 DOCREV CUR MEDS BY ELIG CLIN: HCPCS | Performed by: INTERNAL MEDICINE

## 2023-08-28 PROCEDURE — 3017F COLORECTAL CA SCREEN DOC REV: CPT | Performed by: INTERNAL MEDICINE

## 2023-08-28 PROCEDURE — 99214 OFFICE O/P EST MOD 30 MIN: CPT | Performed by: INTERNAL MEDICINE

## 2023-08-28 PROCEDURE — 3023F SPIROM DOC REV: CPT | Performed by: INTERNAL MEDICINE

## 2023-08-28 PROCEDURE — 3078F DIAST BP <80 MM HG: CPT | Performed by: INTERNAL MEDICINE

## 2023-08-28 PROCEDURE — 3074F SYST BP LT 130 MM HG: CPT | Performed by: INTERNAL MEDICINE

## 2023-08-28 PROCEDURE — G8417 CALC BMI ABV UP PARAM F/U: HCPCS | Performed by: INTERNAL MEDICINE

## 2023-08-28 PROCEDURE — 1123F ACP DISCUSS/DSCN MKR DOCD: CPT | Performed by: INTERNAL MEDICINE

## 2023-08-28 PROCEDURE — 1036F TOBACCO NON-USER: CPT | Performed by: INTERNAL MEDICINE

## 2023-08-28 RX ORDER — ALBUTEROL SULFATE 90 UG/1
2 AEROSOL, METERED RESPIRATORY (INHALATION) EVERY 6 HOURS PRN
Qty: 18 G | Refills: 11 | Status: SHIPPED | OUTPATIENT
Start: 2023-08-28 | End: 2024-08-27

## 2023-08-28 RX ORDER — ALBUTEROL SULFATE 2.5 MG/3ML
2.5 SOLUTION RESPIRATORY (INHALATION) EVERY 6 HOURS PRN
Qty: 360 ML | Refills: 11 | Status: SHIPPED | OUTPATIENT
Start: 2023-08-28

## 2023-08-28 RX ORDER — FLUTICASONE FUROATE, UMECLIDINIUM BROMIDE AND VILANTEROL TRIFENATATE 100; 62.5; 25 UG/1; UG/1; UG/1
1 POWDER RESPIRATORY (INHALATION) DAILY
Qty: 60 EACH | Refills: 11 | Status: SHIPPED | OUTPATIENT
Start: 2023-08-28

## 2023-08-28 RX ORDER — ROFLUMILAST 500 UG/1
500 TABLET ORAL DAILY
Qty: 30 TABLET | Refills: 11 | Status: SHIPPED | OUTPATIENT
Start: 2023-08-28

## 2023-08-28 NOTE — PROGRESS NOTES
rhythm without murmur, gallop or rub. The abdomen is soft, nontender and nondistended. There is no clubbing, cyanosis or edema of the extremities. There is no obvious skin rash. No focal neuro deficicts  Normal mood and affect      Allergies   Allergen Reactions    Lisinopril Other (See Comments)     COUGH     Prior to Visit Medications    Medication Sig Taking?  Authorizing Provider   finasteride (PROSCAR) 5 MG tablet Take 1 tablet by mouth daily Yes Historical Provider, MD Topher Orellana 100-62.5-25 MCG/ACT AEPB inhaler INHALE 1 PUFF INTO THE LUNGS DAILY Yes Gerardo Fritz MD   nitroGLYCERIN (NITROSTAT) 0.4 MG SL tablet DISSOLVE 1 TABLET UNDER TONGUE EVERY 5 MINUTES AS NEEDED FOR CHEST PAIN Yes Chayo Rodríguez MD   metoprolol succinate (TOPROL XL) 50 MG extended release tablet TAKE 1 TABLET BY MOUTH DAILY Yes ALEXEI Francisco - CNP   clopidogrel (PLAVIX) 75 MG tablet TAKE 1 TABLET BY MOUTH DAILY Yes ALEXEI Francisco - CNP   Roflumilast (DALIRESP) 500 MCG tablet TAKE 1 TABLET BY MOUTH DAILY Yes Taylor Merino MD   spironolactone (ALDACTONE) 25 MG tablet TAKE 1 TABLET BY MOUTH DAILY Yes Robert Stover APRN - CNP   losartan (COZAAR) 50 MG tablet Take 1 tablet by mouth daily Yes ALEXEI Francisco - CNP   doxazosin (CARDURA) 4 MG tablet Take 1 tablet by mouth nightly Yes Robert Stover APRN - TORIN   albuterol sulfate HFA (PROVENTIL;VENTOLIN;PROAIR) 108 (90 Base) MCG/ACT inhaler Inhale 2 puffs into the lungs every 6 hours as needed for Wheezing Yes Gerardo Fritz MD   albuterol (PROVENTIL) (2.5 MG/3ML) 0.083% nebulizer solution Take 3 mLs by nebulization every 6 hours as needed for Wheezing DX:COPD J44.9 Yes Gerardo Fritz MD   glucose (GLUTOSE) 40 % GEL Take 37.5 mLs by mouth as needed (low BS) Yes Chavez Wheeler MD   aspirin 81 MG EC tablet Take 1 tablet by mouth daily Yes Historical Provider, MD   ferrous sulfate (IRON 325) 325 (65 Fe) MG tablet TAKE 1 TABLET BY MOUTH DAILY

## 2023-09-08 RX ORDER — SPIRONOLACTONE 25 MG/1
25 TABLET ORAL DAILY
Qty: 90 TABLET | Refills: 0 | Status: SHIPPED | OUTPATIENT
Start: 2023-09-08

## 2023-11-28 RX ORDER — NITROGLYCERIN 0.4 MG/1
TABLET SUBLINGUAL
Qty: 25 TABLET | Refills: 1 | Status: SHIPPED | OUTPATIENT
Start: 2023-11-28

## 2023-12-08 RX ORDER — SPIRONOLACTONE 25 MG/1
25 TABLET ORAL DAILY
Qty: 90 TABLET | Refills: 0 | Status: SHIPPED | OUTPATIENT
Start: 2023-12-08

## 2023-12-08 NOTE — TELEPHONE ENCOUNTER
Last OV: 23 DAH  Next OV: 01/15/24 DAH  Last Labs: BMP 22  Last EK23  Last Fill:   spironolactone (ALDACTONE) 25 MG tablet 90 tablet 0 2023     Sig - Route: TAKE 1 TABLET BY MOUTH DAILY - Oral    Sent to pharmacy as: Spironolactone 25 MG Oral Tablet (ALDACTONE)    E-Prescribing Status: Receipt confirmed by pharmacy (2023  9:43 AM EDT)

## 2024-01-16 RX ORDER — DOXAZOSIN MESYLATE 4 MG/1
4 TABLET ORAL NIGHTLY
Qty: 90 TABLET | Refills: 3 | Status: SHIPPED | OUTPATIENT
Start: 2024-01-16

## 2024-01-16 NOTE — TELEPHONE ENCOUNTER
Medication Refill    Medication needing refilled:  doxazosin (CARDURA) 4 MG tablet     Dosage of the medication:    How are you taking this medication (QD, BID, TID, QID, PRN):  Take 1 tablet by mouth nightly     30 or 90 day supply called in:  90 days    When will you run out of your medication:    Which Pharmacy are we sending the medication to?:  [id number] [address] [phone]  F [phone]

## 2024-02-13 NOTE — PROGRESS NOTES
[facility]   Cardiac Evaluation      Patient: James Cochran  YOB: 1950      No chief complaint on file.         Referring provider: Leanne Drummond MD    History of Present Illness:   Mr Cochran is seen today in follow up. Cardiac history includes CAD w/ cardiomyopathy, hyperlipidemia, and hypertension. He states he had a MI in 2017 with 2 stents at [facility] and is now on Plavix. He was seen in the ER on 1/10/23 for chest pain.   Today, Mr Cochran states he has been having nosebleeds for approx 1 week. 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   denies chest pain, palpitations, dizziness, or edema.     Past Medical History:   has a past medical history of COPD (chronic obstructive pulmonary disease) ([facility]), Coronary artery disease involving native coronary artery of native heart with angina pectoris ([facility]), Depression, Diabetes mellitus ([facility]), Dysphagia, Enlarged prostate, Heart enlarged, Hyperlipidemia, Hypertension, MRSA (methicillin resistant staph aureus) culture positive, Obesity, Pneumonia, and Sleep apnea.    Surgical History:   has a past surgical history that includes Prostate surgery; Gallbladder surgery; Dental surgery; Coronary angioplasty (01/11/2017); Cholecystectomy; bronchoscopy (04/25/2018); cystoscopy w biopsy of bladder (N/A, 3/15/2021); TURP (N/A, 3/15/2021); and Cystoscopy (N/A, 4/14/2021).     Current Outpatient Medications   Medication Sig Dispense Refill    zolpidem (AMBIEN) 10 MG tablet 1/2 to 1 po qhs prn      doxazosin (CARDURA) 4 MG tablet Take 1 tablet by mouth nightly 90 tablet 3    spironolactone (ALDACTONE) 25 MG tablet Take 1 tablet by mouth daily 90 tablet 0    albuterol (PROVENTIL) (2.5 MG/3ML) 0.083% nebulizer solution Take 3 mLs by nebulization every 6 hours as needed for Wheezing DX:COPD J44.9 360 mL 11    albuterol sulfate HFA (PROVENTIL;VENTOLIN;PROAIR) 108 (90 Base) MCG/ACT

## 2024-02-26 ENCOUNTER — OFFICE VISIT (OUTPATIENT)
Dept: CARDIOLOGY CLINIC | Age: 74
End: 2024-02-26
Payer: MEDICARE

## 2024-02-26 VITALS
BODY MASS INDEX: 37.52 KG/M2 | OXYGEN SATURATION: 98 % | WEIGHT: 268 LBS | HEART RATE: 68 BPM | HEIGHT: 71 IN | DIASTOLIC BLOOD PRESSURE: 66 MMHG | SYSTOLIC BLOOD PRESSURE: 114 MMHG

## 2024-02-26 DIAGNOSIS — I25.119 CORONARY ARTERY DISEASE INVOLVING NATIVE CORONARY ARTERY OF NATIVE HEART WITH ANGINA PECTORIS (HCC): Primary | ICD-10-CM

## 2024-02-26 DIAGNOSIS — G47.33 OSA (OBSTRUCTIVE SLEEP APNEA): ICD-10-CM

## 2024-02-26 DIAGNOSIS — E78.2 MIXED HYPERLIPIDEMIA: ICD-10-CM

## 2024-02-26 DIAGNOSIS — I10 ESSENTIAL HYPERTENSION: ICD-10-CM

## 2024-02-26 PROCEDURE — 3017F COLORECTAL CA SCREEN DOC REV: CPT | Performed by: INTERNAL MEDICINE

## 2024-02-26 PROCEDURE — 1036F TOBACCO NON-USER: CPT | Performed by: INTERNAL MEDICINE

## 2024-02-26 PROCEDURE — 3078F DIAST BP <80 MM HG: CPT | Performed by: INTERNAL MEDICINE

## 2024-02-26 PROCEDURE — 3074F SYST BP LT 130 MM HG: CPT | Performed by: INTERNAL MEDICINE

## 2024-02-26 PROCEDURE — G8417 CALC BMI ABV UP PARAM F/U: HCPCS | Performed by: INTERNAL MEDICINE

## 2024-02-26 PROCEDURE — G8484 FLU IMMUNIZE NO ADMIN: HCPCS | Performed by: INTERNAL MEDICINE

## 2024-02-26 PROCEDURE — 99214 OFFICE O/P EST MOD 30 MIN: CPT | Performed by: INTERNAL MEDICINE

## 2024-02-26 PROCEDURE — 1123F ACP DISCUSS/DSCN MKR DOCD: CPT | Performed by: INTERNAL MEDICINE

## 2024-02-26 PROCEDURE — G8427 DOCREV CUR MEDS BY ELIG CLIN: HCPCS | Performed by: INTERNAL MEDICINE

## 2024-02-26 RX ORDER — CLOPIDOGREL BISULFATE 75 MG/1
75 TABLET ORAL EVERY OTHER DAY
Qty: 90 TABLET | Refills: 3
Start: 2024-02-26

## 2024-02-26 RX ORDER — ZOLPIDEM TARTRATE 10 MG/1
TABLET ORAL
COMMUNITY
Start: 2024-02-13 | End: 2024-06-12

## 2024-03-04 ENCOUNTER — OFFICE VISIT (OUTPATIENT)
Dept: PULMONOLOGY | Age: 74
End: 2024-03-04
Payer: MEDICARE

## 2024-03-04 VITALS
SYSTOLIC BLOOD PRESSURE: 126 MMHG | BODY MASS INDEX: 37.8 KG/M2 | OXYGEN SATURATION: 97 % | HEIGHT: 71 IN | HEART RATE: 76 BPM | DIASTOLIC BLOOD PRESSURE: 80 MMHG | WEIGHT: 270 LBS

## 2024-03-04 DIAGNOSIS — J44.9 COPD, SEVERE (HCC): Primary | ICD-10-CM

## 2024-03-04 DIAGNOSIS — J96.11 CHRONIC HYPOXEMIC RESPIRATORY FAILURE (HCC): ICD-10-CM

## 2024-03-04 DIAGNOSIS — I25.5 ISCHEMIC CARDIOMYOPATHY: ICD-10-CM

## 2024-03-04 DIAGNOSIS — G47.33 OSA (OBSTRUCTIVE SLEEP APNEA): ICD-10-CM

## 2024-03-04 DIAGNOSIS — I25.119 CORONARY ARTERY DISEASE INVOLVING NATIVE CORONARY ARTERY OF NATIVE HEART WITH ANGINA PECTORIS (HCC): ICD-10-CM

## 2024-03-04 PROCEDURE — 3079F DIAST BP 80-89 MM HG: CPT | Performed by: INTERNAL MEDICINE

## 2024-03-04 PROCEDURE — G8427 DOCREV CUR MEDS BY ELIG CLIN: HCPCS | Performed by: INTERNAL MEDICINE

## 2024-03-04 PROCEDURE — 3074F SYST BP LT 130 MM HG: CPT | Performed by: INTERNAL MEDICINE

## 2024-03-04 PROCEDURE — 99214 OFFICE O/P EST MOD 30 MIN: CPT | Performed by: INTERNAL MEDICINE

## 2024-03-04 PROCEDURE — G8417 CALC BMI ABV UP PARAM F/U: HCPCS | Performed by: INTERNAL MEDICINE

## 2024-03-04 PROCEDURE — 3023F SPIROM DOC REV: CPT | Performed by: INTERNAL MEDICINE

## 2024-03-04 PROCEDURE — 1036F TOBACCO NON-USER: CPT | Performed by: INTERNAL MEDICINE

## 2024-03-04 PROCEDURE — G8484 FLU IMMUNIZE NO ADMIN: HCPCS | Performed by: INTERNAL MEDICINE

## 2024-03-04 PROCEDURE — 1123F ACP DISCUSS/DSCN MKR DOCD: CPT | Performed by: INTERNAL MEDICINE

## 2024-03-04 PROCEDURE — 3017F COLORECTAL CA SCREEN DOC REV: CPT | Performed by: INTERNAL MEDICINE

## 2024-03-04 RX ORDER — ALBUTEROL SULFATE 90 UG/1
2 AEROSOL, METERED RESPIRATORY (INHALATION) EVERY 6 HOURS PRN
Qty: 18 G | Refills: 11 | Status: SHIPPED | OUTPATIENT
Start: 2024-03-04 | End: 2025-03-04

## 2024-03-04 RX ORDER — BLOOD SUGAR DIAGNOSTIC
1 STRIP MISCELLANEOUS DAILY
COMMUNITY
Start: 2024-02-27

## 2024-03-04 NOTE — PROGRESS NOTES
[facility]  Progress Note  MD James Jara MARYBETH AstudilloCochran   YOB: 1950    Date of Visit:  3/4/2024    Assessment/Plan:  1. Shortness of breath  Stable  No recent flare ups    2. Chronic cough/Hemoptysis  Stable    3. COPD, severe (HCC)  PFT 9/19:  Stable  INTERPRETATION:  Spirometry reveals decreased FVC at 2.88 liters which  is 62% predicted.  FEV1 is decreased to 2.04 liters which is 59%  predicted.  FEV1/FVC ratio is at the lower limits of normal at 71%.   There is a 15% improvement in FEV1 after inhaled bronchodilators.  Lung  volumes reveal increased total lung capacity of 119% predicted.   Residual volume is increased at 175% predicted.  Diffusion capacity is  normal.     IMPRESSION:  Moderate obstructive lung disease with hyperinflation.    Medication Management:  The patient benefits from the medical regimen and reports no complications.    Continue:  Anoro ==> Trelegy (this has been a lot better)  Albuterol HFA/HHN  Daliresp    4. Cardiomyopathy ([facility])  LVEF 30%  Followed by cardiology    5. Coronary artery disease involving native coronary artery of native heart with angina pectoris ([facility])  Two stents at [facility] 1/17    6. Chronic Hypoxemic Respiratory Failure  O2 with sleep   He is not using during the day  He can stop his portable O2.     7. KARYNA  He is not on CPAP  Does not tolerate that  He was asking about Inspire but BMI is too high for him to qualify.    FOLLOW UP: 6 months      Chief Complaint   Patient presents with    COPD    Shortness of Breath       HPI  The patient presents with a chief complaint of severe shortness of breath related to moderate to severe COPD of many years duration. He has mild associated cough. He was ion ER last week with SOB.     He has stable since his last visit.    Review of Systems  No Chest pain, Nausea or vomiting reported      Physical Exam:    Well developed, well nourished  Alert and oriented  Sclera

## 2024-03-12 ENCOUNTER — TELEPHONE (OUTPATIENT)
Dept: PULMONOLOGY | Age: 74
End: 2024-03-12

## 2024-03-12 NOTE — TELEPHONE ENCOUNTER
Could we initiate a prior auth for med below:    albuterol sulfate HFA (PROVENTIL;VENTOLIN;PROAIR) 108 (90 Base) MCG/ACT inhaler [[id number]]    Order Details  Dose: 2 puff Route: Inhalation Frequency: EVERY 6 HOURS PRN for Wheezing   Dispense Quantity: 18 g Refills: 11          Sig: Inhale 2 puffs into the lungs every 6 hours as needed for Wheezing         Start Date: 03/04/24

## 2024-03-12 NOTE — TELEPHONE ENCOUNTER
Submitted PA for ALBUTEROL  Via [facility] Key: [id number]  STATUS: PENDING.    Follow up done daily; if no decision with in three days we will refax.  If another three days goes by with no decision will call the insurance for status.

## 2024-04-26 ENCOUNTER — HOSPITAL ENCOUNTER (EMERGENCY)
Age: 74
Discharge: HOME | End: 2024-04-26
Attending: EMERGENCY MEDICINE
Payer: MEDICARE

## 2024-04-26 ENCOUNTER — APPOINTMENT (OUTPATIENT)
Dept: GENERAL RADIOLOGY | Age: 74
End: 2024-04-26
Payer: MEDICARE

## 2024-04-26 VITALS
OXYGEN SATURATION: 99 % | DIASTOLIC BLOOD PRESSURE: 88 MMHG | SYSTOLIC BLOOD PRESSURE: 154 MMHG | TEMPERATURE: 98 F | RESPIRATION RATE: 16 BRPM | BODY MASS INDEX: 37.1 KG/M2 | WEIGHT: 266 LBS | HEART RATE: 76 BPM

## 2024-04-26 DIAGNOSIS — I10 ESSENTIAL HYPERTENSION: ICD-10-CM

## 2024-04-26 DIAGNOSIS — R61 SWEATING INCREASE: Primary | ICD-10-CM

## 2024-04-26 DIAGNOSIS — Z86.79 HISTORY OF CORONARY ARTERY DISEASE: ICD-10-CM

## 2024-04-26 LAB
ALBUMIN SERPL-MCNC: 3.9 G/DL (ref 3.4–5)
ALBUMIN/GLOB SERPL: 1.2 {RATIO} (ref 1.1–2.2)
ALP SERPL-CCNC: 117 U/L (ref 40–129)
ALT SERPL-CCNC: 13 U/L (ref 10–40)
ANION GAP SERPL CALCULATED.3IONS-SCNC: 13 MMOL/L (ref 3–16)
AST SERPL-CCNC: 22 U/L (ref 15–37)
BASOPHILS # BLD: 0.1 K/UL (ref 0–0.2)
BASOPHILS NFR BLD: 0.5 %
BILIRUB SERPL-MCNC: 0.4 MG/DL (ref 0–1)
BUN SERPL-MCNC: 25 MG/DL (ref 7–20)
CALCIUM SERPL-MCNC: 9.5 MG/DL (ref 8.3–10.6)
CHLORIDE SERPL-SCNC: 103 MMOL/L (ref 99–110)
CO2 SERPL-SCNC: 21 MMOL/L (ref 21–32)
CREAT SERPL-MCNC: 0.8 MG/DL (ref 0.8–1.3)
DEPRECATED RDW RBC AUTO: 16.7 % (ref 12.4–15.4)
EOSINOPHIL # BLD: 0.1 K/UL (ref 0–0.6)
EOSINOPHIL NFR BLD: 1.1 %
GFR SERPLBLD CREATININE-BSD FMLA CKD-EPI: >90 ML/MIN/{1.73_M2}
GLUCOSE SERPL-MCNC: 101 MG/DL (ref 70–99)
HCT VFR BLD AUTO: 37.1 % (ref 40.5–52.5)
HGB BLD-MCNC: 12.5 G/DL (ref 13.5–17.5)
LYMPHOCYTES # BLD: 0.7 K/UL (ref 1–5.1)
LYMPHOCYTES NFR BLD: 6.5 %
MCH RBC QN AUTO: 24.8 PG (ref 26–34)
MCHC RBC AUTO-ENTMCNC: 33.6 G/DL (ref 31–36)
MCV RBC AUTO: 73.8 FL (ref 80–100)
MONOCYTES # BLD: 0.6 K/UL (ref 0–1.3)
MONOCYTES NFR BLD: 5.7 %
NEUTROPHILS # BLD: 8.9 K/UL (ref 1.7–7.7)
NEUTROPHILS NFR BLD: 86.2 %
PLATELET # BLD AUTO: 194 K/UL (ref 135–450)
PMV BLD AUTO: 8.1 FL (ref 5–10.5)
POTASSIUM SERPL-SCNC: 4.4 MMOL/L (ref 3.5–5.1)
PROT SERPL-MCNC: 7.1 G/DL (ref 6.4–8.2)
RBC # BLD AUTO: 5.03 M/UL (ref 4.2–5.9)
SODIUM SERPL-SCNC: 137 MMOL/L (ref 136–145)
TROPONIN, HIGH SENSITIVITY: 11 NG/L (ref 0–22)
TROPONIN, HIGH SENSITIVITY: 12 NG/L (ref 0–22)
WBC # BLD AUTO: 10.4 K/UL (ref 4–11)

## 2024-04-26 PROCEDURE — 71046 X-RAY EXAM CHEST 2 VIEWS: CPT

## 2024-04-26 PROCEDURE — 99285 EMERGENCY DEPT VISIT HI MDM: CPT

## 2024-04-26 PROCEDURE — 85025 COMPLETE CBC W/AUTO DIFF WBC: CPT

## 2024-04-26 PROCEDURE — 80053 COMPREHEN METABOLIC PANEL: CPT

## 2024-04-26 PROCEDURE — 93005 ELECTROCARDIOGRAM TRACING: CPT | Performed by: EMERGENCY MEDICINE

## 2024-04-26 PROCEDURE — 6370000000 HC RX 637 (ALT 250 FOR IP): Performed by: EMERGENCY MEDICINE

## 2024-04-26 PROCEDURE — 84484 ASSAY OF TROPONIN QUANT: CPT

## 2024-04-26 RX ORDER — ASPIRIN 81 MG/1
324 TABLET, CHEWABLE ORAL ONCE
Status: COMPLETED | OUTPATIENT
Start: 2024-04-26 | End: 2024-04-26

## 2024-04-26 RX ORDER — HYDROXYZINE HYDROCHLORIDE 25 MG/1
25 TABLET, FILM COATED ORAL EVERY 8 HOURS PRN
Qty: 20 TABLET | Refills: 0 | Status: SHIPPED | OUTPATIENT
Start: 2024-04-26 | End: 2024-05-06

## 2024-04-26 RX ORDER — LORAZEPAM 1 MG/1
1 TABLET ORAL ONCE
Status: COMPLETED | OUTPATIENT
Start: 2024-04-26 | End: 2024-04-26

## 2024-04-26 RX ADMIN — ASPIRIN 324 MG: 81 TABLET, CHEWABLE ORAL at 19:36

## 2024-04-26 RX ADMIN — LORAZEPAM 1 MG: 1 TABLET ORAL at 19:36

## 2024-04-27 LAB
EKG ATRIAL RATE: 71 BPM
EKG DIAGNOSIS: NORMAL
EKG P-R INTERVAL: 136 MS
EKG Q-T INTERVAL: 358 MS
EKG QRS DURATION: 84 MS
EKG QTC CALCULATION (BAZETT): 389 MS
EKG R AXIS: -16 DEGREES
EKG T AXIS: 37 DEGREES
EKG VENTRICULAR RATE: 71 BPM

## 2024-04-27 PROCEDURE — 93010 ELECTROCARDIOGRAM REPORT: CPT | Performed by: INTERNAL MEDICINE

## 2024-07-09 RX ORDER — CLOPIDOGREL BISULFATE 75 MG/1
75 TABLET ORAL EVERY OTHER DAY
Qty: 90 TABLET | Refills: 3 | Status: SHIPPED | OUTPATIENT
Start: 2024-07-09

## 2024-08-08 NOTE — PROGRESS NOTES
[facility]   Cardiac Evaluation      Patient: James Cochran  YOB: 1950      Chief Complaint   Patient presents with    Coronary Artery Disease    Cardiomyopathy    Hypertension    Hyperlipidemia          Referring provider: Leanne Drummond MD    History of Present Illness:   Mr Cochran is seen today in follow up. Cardiac history includes CAD w/ cardiomyopathy, hyperlipidemia, and hypertension. He states he had a MI in 2017 with 2 stents at [facility] and is now on Plavix. He was seen in the ER on 1/10/23 for chest pain.   Today, Mr Cochran states his chest hurts when he coughs. He denies exertional chest pain, palpitations, WALLS, or edema. He states he walks in grocery stores for exercise.  has shoulder and arm pain; he was prescribed Gabapentin for this. He states he has a pinched nerve.       Past Medical History:   has a past medical history of COPD (chronic obstructive pulmonary disease) ([facility]), Coronary artery disease involving native coronary artery of native heart with angina pectoris ([facility]), Depression, Diabetes mellitus (HCC), Dysphagia, Enlarged prostate, Heart enlarged, Hyperlipidemia, Hypertension, MRSA (methicillin resistant staph aureus) culture positive, Obesity, Pneumonia, and Sleep apnea.    Surgical History:   has a past surgical history that includes Prostate surgery; Gallbladder surgery; Dental surgery; Coronary angioplasty (01/11/2017); Cholecystectomy; bronchoscopy (04/25/2018); cystoscopy w biopsy of bladder (N/A, 3/15/2021); TURP (N/A, 3/15/2021); and Cystoscopy (N/A, 4/14/2021).     Current Outpatient Medications   Medication Sig Dispense Refill    Roflumilast (DALIRESP) 500 MCG tablet TAKE 1 TABLET BY MOUTH DAILY 30 tablet 11    clonazePAM (KLONOPIN) 1 MG tablet Take 1 tablet by mouth daily as needed.      clopidogrel (PLAVIX) 75 MG tablet Take 1 tablet by mouth every other day (Patient taking differently: Take 1 tablet by mouth daily) 90 tablet 3    doxazosin

## 2024-08-27 ENCOUNTER — OFFICE VISIT (OUTPATIENT)
Dept: CARDIOLOGY CLINIC | Age: 74
End: 2024-08-27
Payer: MEDICARE

## 2024-08-27 VITALS
SYSTOLIC BLOOD PRESSURE: 112 MMHG | OXYGEN SATURATION: 97 % | HEART RATE: 61 BPM | DIASTOLIC BLOOD PRESSURE: 68 MMHG | WEIGHT: 279 LBS | BODY MASS INDEX: 39.06 KG/M2 | HEIGHT: 71 IN

## 2024-08-27 DIAGNOSIS — R42 DIZZINESS: ICD-10-CM

## 2024-08-27 DIAGNOSIS — I65.29 STENOSIS OF CAROTID ARTERY, UNSPECIFIED LATERALITY: Primary | ICD-10-CM

## 2024-08-27 PROCEDURE — 1123F ACP DISCUSS/DSCN MKR DOCD: CPT | Performed by: INTERNAL MEDICINE

## 2024-08-27 PROCEDURE — G8417 CALC BMI ABV UP PARAM F/U: HCPCS | Performed by: INTERNAL MEDICINE

## 2024-08-27 PROCEDURE — 3078F DIAST BP <80 MM HG: CPT | Performed by: INTERNAL MEDICINE

## 2024-08-27 PROCEDURE — 99214 OFFICE O/P EST MOD 30 MIN: CPT | Performed by: INTERNAL MEDICINE

## 2024-08-27 PROCEDURE — 3017F COLORECTAL CA SCREEN DOC REV: CPT | Performed by: INTERNAL MEDICINE

## 2024-08-27 PROCEDURE — 1036F TOBACCO NON-USER: CPT | Performed by: INTERNAL MEDICINE

## 2024-08-27 PROCEDURE — 3074F SYST BP LT 130 MM HG: CPT | Performed by: INTERNAL MEDICINE

## 2024-08-27 PROCEDURE — G8427 DOCREV CUR MEDS BY ELIG CLIN: HCPCS | Performed by: INTERNAL MEDICINE

## 2024-08-27 RX ORDER — ROFLUMILAST 500 UG/1
500 TABLET ORAL DAILY
Qty: 30 TABLET | Refills: 11 | Status: SHIPPED | OUTPATIENT
Start: 2024-08-27

## 2024-08-27 RX ORDER — CLONAZEPAM 1 MG/1
1 TABLET ORAL DAILY PRN
COMMUNITY
Start: 2024-08-16

## 2024-08-28 ENCOUNTER — TELEPHONE (OUTPATIENT)
Dept: PULMONOLOGY | Age: 74
End: 2024-08-28

## 2024-08-28 RX ORDER — PREDNISONE 10 MG/1
TABLET ORAL
Qty: 30 TABLET | Refills: 0 | Status: SHIPPED | OUTPATIENT
Start: 2024-08-28 | End: 2024-09-07

## 2024-08-28 RX ORDER — DOXYCYCLINE 100 MG/1
100 CAPSULE ORAL 2 TIMES DAILY
Qty: 20 CAPSULE | Refills: 0 | Status: SHIPPED | OUTPATIENT
Start: 2024-08-28 | End: 2024-09-07

## 2024-08-28 NOTE — TELEPHONE ENCOUNTER
Incoming Triage Calls For Sick Patients:  As a new policy our physicians are asking that we get a little more info and testing before they start to send meds to pharmacies. Please Note that we will treat if Pulmonary/Lung related with proper and necessary steps.     Patient needs to answer yes or no to questions below:     Symptom Duration- 3-4days  Cough(Productive)- constant cough, yes; yellow  Chest Congestion-yes  Headache-yes  Fever-no  Covid test, Flu or RSV taken- NONE  Sore Throat-no  Shortness of breath- no  Wheezing- no  Are you on Oxygen- nocturnal  How Many Liters-2  O2 stat-98  Body Aches-no  Fatigue- no  Any Over the counter meds tried- no     Pharmacy-  [id number] - [place], [place] - [address] [place] [address] ST - P [phone] - F [phone]

## 2024-08-28 NOTE — TELEPHONE ENCOUNTER
Patient called and said he has had a cough for the last 3-4 days coughing up yellow phlegm. Hurts to cough or breath sometimes,No wheezing    [id number] [address] [phone] - F [phone]     PH: [phone]

## 2024-08-30 ENCOUNTER — HOSPITAL ENCOUNTER (OUTPATIENT)
Dept: VASCULAR LAB | Age: 74
End: 2024-08-30
Attending: INTERNAL MEDICINE
Payer: MEDICARE

## 2024-08-30 DIAGNOSIS — R42 DIZZINESS: ICD-10-CM

## 2024-08-30 LAB
VAS LEFT ARM BP: 152 MMHG
VAS LEFT CCA DIST EDV: 26.9 CM/S
VAS LEFT CCA DIST PSV: 78.8 CM/S
VAS LEFT CCA MID EDV: 19.1 CM/S
VAS LEFT CCA MID PSV: 78.8 CM/S
VAS LEFT CCA PROX EDV: 21.7 CM/S
VAS LEFT CCA PROX PSV: 90.1 CM/S
VAS LEFT ECA EDV: 17.3 CM/S
VAS LEFT ECA PSV: 79.7 CM/S
VAS LEFT ICA DIST EDV: 23.7 CM/S
VAS LEFT ICA DIST PSV: 53.1 CM/S
VAS LEFT ICA MID EDV: 25.9 CM/S
VAS LEFT ICA MID PSV: 52.7 CM/S
VAS LEFT ICA PROX EDV: 18.7 CM/S
VAS LEFT ICA PROX PSV: 38.3 CM/S
VAS LEFT ICA/CCA PSV: 0.67
VAS LEFT SUBCLAVIAN PROX PSV: 115 CM/S
VAS LEFT VERTEBRAL EDV: 14.1 CM/S
VAS LEFT VERTEBRAL PSV: 36.7 CM/S
VAS RIGHT ARM BP: 148 MMHG
VAS RIGHT CCA DIST EDV: 18 CM/S
VAS RIGHT CCA DIST PSV: 60.3 CM/S
VAS RIGHT CCA MID EDV: 15.5 CM/S
VAS RIGHT CCA MID PSV: 77.7 CM/S
VAS RIGHT CCA PROX EDV: 16.2 CM/S
VAS RIGHT CCA PROX PSV: 88.8 CM/S
VAS RIGHT ECA EDV: 11.8 CM/S
VAS RIGHT ECA PSV: 56.5 CM/S
VAS RIGHT ICA DIST EDV: 11.8 CM/S
VAS RIGHT ICA DIST PSV: 40.6 CM/S
VAS RIGHT ICA MID EDV: 23.3 CM/S
VAS RIGHT ICA MID PSV: 52.9 CM/S
VAS RIGHT ICA PROX EDV: 15.1 CM/S
VAS RIGHT ICA PROX PSV: 32.9 CM/S
VAS RIGHT ICA/CCA PSV: 0.88
VAS RIGHT SUBCLAVIAN PROX PSV: 88.8 CM/S
VAS RIGHT VERTEBRAL EDV: 13.2 CM/S
VAS RIGHT VERTEBRAL PSV: 34.8 CM/S

## 2024-08-30 PROCEDURE — 93880 EXTRACRANIAL BILAT STUDY: CPT | Performed by: INTERNAL MEDICINE

## 2024-08-30 PROCEDURE — 93880 EXTRACRANIAL BILAT STUDY: CPT

## 2024-09-09 ENCOUNTER — OFFICE VISIT (OUTPATIENT)
Dept: PULMONOLOGY | Age: 74
End: 2024-09-09
Payer: MEDICARE

## 2024-09-09 VITALS
HEIGHT: 71 IN | SYSTOLIC BLOOD PRESSURE: 124 MMHG | OXYGEN SATURATION: 99 % | DIASTOLIC BLOOD PRESSURE: 82 MMHG | WEIGHT: 277.4 LBS | HEART RATE: 86 BPM | BODY MASS INDEX: 38.84 KG/M2

## 2024-09-09 DIAGNOSIS — I25.5 ISCHEMIC CARDIOMYOPATHY: ICD-10-CM

## 2024-09-09 DIAGNOSIS — J96.11 CHRONIC HYPOXEMIC RESPIRATORY FAILURE (HCC): ICD-10-CM

## 2024-09-09 DIAGNOSIS — J44.9 COPD, SEVERE (HCC): Primary | ICD-10-CM

## 2024-09-09 DIAGNOSIS — G47.33 OSA (OBSTRUCTIVE SLEEP APNEA): ICD-10-CM

## 2024-09-09 PROCEDURE — G8427 DOCREV CUR MEDS BY ELIG CLIN: HCPCS | Performed by: INTERNAL MEDICINE

## 2024-09-09 PROCEDURE — 1036F TOBACCO NON-USER: CPT | Performed by: INTERNAL MEDICINE

## 2024-09-09 PROCEDURE — 3023F SPIROM DOC REV: CPT | Performed by: INTERNAL MEDICINE

## 2024-09-09 PROCEDURE — 3017F COLORECTAL CA SCREEN DOC REV: CPT | Performed by: INTERNAL MEDICINE

## 2024-09-09 PROCEDURE — 1123F ACP DISCUSS/DSCN MKR DOCD: CPT | Performed by: INTERNAL MEDICINE

## 2024-09-09 PROCEDURE — 3079F DIAST BP 80-89 MM HG: CPT | Performed by: INTERNAL MEDICINE

## 2024-09-09 PROCEDURE — 99214 OFFICE O/P EST MOD 30 MIN: CPT | Performed by: INTERNAL MEDICINE

## 2024-09-09 PROCEDURE — 3074F SYST BP LT 130 MM HG: CPT | Performed by: INTERNAL MEDICINE

## 2024-09-09 PROCEDURE — G8417 CALC BMI ABV UP PARAM F/U: HCPCS | Performed by: INTERNAL MEDICINE

## 2024-09-09 RX ORDER — ALBUTEROL SULFATE 90 UG/1
2 AEROSOL, METERED RESPIRATORY (INHALATION) EVERY 6 HOURS PRN
Qty: 18 G | Refills: 11 | Status: SHIPPED | OUTPATIENT
Start: 2024-09-09 | End: 2025-09-09

## 2024-09-09 RX ORDER — FLUTICASONE FUROATE, UMECLIDINIUM BROMIDE AND VILANTEROL TRIFENATATE 100; 62.5; 25 UG/1; UG/1; UG/1
1 POWDER RESPIRATORY (INHALATION) DAILY
Qty: 60 EACH | Refills: 11 | Status: SHIPPED | OUTPATIENT
Start: 2024-09-09

## 2024-09-09 RX ORDER — ALBUTEROL SULFATE 0.83 MG/ML
2.5 SOLUTION RESPIRATORY (INHALATION) EVERY 6 HOURS PRN
Qty: 360 ML | Refills: 11 | Status: SHIPPED | OUTPATIENT
Start: 2024-09-09

## 2024-10-14 RX ORDER — NITROGLYCERIN 0.4 MG/1
TABLET SUBLINGUAL
Qty: 25 TABLET | Refills: 1 | Status: SHIPPED | OUTPATIENT
Start: 2024-10-14

## 2025-01-03 NOTE — TELEPHONE ENCOUNTER
Received refill request for Doxazosin from [facility] pharmacy.     Last OV: 8/27/2024 DAH     Next OV: 2/26/2025 DAH    Last Labs: 4/26/24 EKG, CMP

## 2025-01-07 RX ORDER — DOXAZOSIN 4 MG/1
4 TABLET ORAL NIGHTLY
Qty: 90 TABLET | Refills: 3 | Status: SHIPPED | OUTPATIENT
Start: 2025-01-07

## 2025-01-13 RX ORDER — NITROGLYCERIN 0.4 MG/1
TABLET SUBLINGUAL
Qty: 25 TABLET | Refills: 1 | OUTPATIENT
Start: 2025-01-13

## 2025-01-13 NOTE — TELEPHONE ENCOUNTER
Spoke w/ . Rx was sent 10/14/24. I asked him if he is using NTG more often. He states he uses it at times, but not more than he always has. He states he does not need a refill at this time. Rx denied.

## 2025-03-10 ENCOUNTER — OFFICE VISIT (OUTPATIENT)
Dept: PULMONOLOGY | Age: 75
End: 2025-03-10
Payer: MEDICARE

## 2025-03-10 VITALS
SYSTOLIC BLOOD PRESSURE: 126 MMHG | HEART RATE: 79 BPM | DIASTOLIC BLOOD PRESSURE: 78 MMHG | BODY MASS INDEX: 38.69 KG/M2 | HEIGHT: 71 IN | OXYGEN SATURATION: 97 %

## 2025-03-10 DIAGNOSIS — J44.9 COPD, SEVERE (HCC): ICD-10-CM

## 2025-03-10 PROCEDURE — G8417 CALC BMI ABV UP PARAM F/U: HCPCS | Performed by: INTERNAL MEDICINE

## 2025-03-10 PROCEDURE — 3078F DIAST BP <80 MM HG: CPT | Performed by: INTERNAL MEDICINE

## 2025-03-10 PROCEDURE — 1036F TOBACCO NON-USER: CPT | Performed by: INTERNAL MEDICINE

## 2025-03-10 PROCEDURE — 1123F ACP DISCUSS/DSCN MKR DOCD: CPT | Performed by: INTERNAL MEDICINE

## 2025-03-10 PROCEDURE — 3017F COLORECTAL CA SCREEN DOC REV: CPT | Performed by: INTERNAL MEDICINE

## 2025-03-10 PROCEDURE — 3074F SYST BP LT 130 MM HG: CPT | Performed by: INTERNAL MEDICINE

## 2025-03-10 PROCEDURE — G8427 DOCREV CUR MEDS BY ELIG CLIN: HCPCS | Performed by: INTERNAL MEDICINE

## 2025-03-10 PROCEDURE — 1159F MED LIST DOCD IN RCRD: CPT | Performed by: INTERNAL MEDICINE

## 2025-03-10 PROCEDURE — 3023F SPIROM DOC REV: CPT | Performed by: INTERNAL MEDICINE

## 2025-03-10 PROCEDURE — 99213 OFFICE O/P EST LOW 20 MIN: CPT | Performed by: INTERNAL MEDICINE

## 2025-03-10 RX ORDER — FLUTICASONE FUROATE, UMECLIDINIUM BROMIDE AND VILANTEROL TRIFENATATE 100; 62.5; 25 UG/1; UG/1; UG/1
1 POWDER RESPIRATORY (INHALATION) DAILY
Qty: 60 EACH | Refills: 11 | Status: SHIPPED | OUTPATIENT
Start: 2025-03-10

## 2025-03-10 RX ORDER — ALBUTEROL SULFATE 0.83 MG/ML
2.5 SOLUTION RESPIRATORY (INHALATION) EVERY 6 HOURS PRN
Qty: 360 ML | Refills: 11 | Status: SHIPPED | OUTPATIENT
Start: 2025-03-10

## 2025-03-10 RX ORDER — ALBUTEROL SULFATE 90 UG/1
2 INHALANT RESPIRATORY (INHALATION) EVERY 6 HOURS PRN
Qty: 18 G | Refills: 11 | Status: SHIPPED | OUTPATIENT
Start: 2025-03-10 | End: 2026-03-10

## 2025-03-10 NOTE — PROGRESS NOTES
[facility]  Progress Note  MD James Jara MARYBETH AstudilloCochran   YOB: 1950    Date of Visit:  3/10/2025    Assessment/Plan:  1. Shortness of breath  Stable  No recent flare ups    2. Chronic cough/Hemoptysis  Stable    3. COPD, severe (HCC)  PFT 9/19:  Stable  INTERPRETATION:  Spirometry reveals decreased FVC at 2.88 liters which  is 62% predicted.  FEV1 is decreased to 2.04 liters which is 59%  predicted.  FEV1/FVC ratio is at the lower limits of normal at 71%.   There is a 15% improvement in FEV1 after inhaled bronchodilators.  Lung  volumes reveal increased total lung capacity of 119% predicted.   Residual volume is increased at 175% predicted.  Diffusion capacity is  normal.     IMPRESSION:  Moderate obstructive lung disease with hyperinflation.    Medication Management:  The patient benefits from the medical regimen and reports no complications.    Continue:  Anoro ==> Trelegy (this has been a lot better)  Albuterol HFA/HHN  Daliresp    4. Cardiomyopathy ([facility])  LVEF 30%  Followed by cardiology    5. Coronary artery disease involving native coronary artery of native heart with angina pectoris ([facility])  Two stents at [facility] 1/17    6. Chronic Hypoxemic Respiratory Failure  O2 with sleep   He is not using during the day  He can stop his portable O2.     7. KARYNA  He is not on CPAP  Does not tolerate that  He was asking about Inspire but BMI is too high for him to qualify.    FOLLOW UP: 6 months      Chief Complaint   Patient presents with    6 Month Follow-Up    COPD       HPI  The patient presents with a chief complaint of severe shortness of breath related to moderate to severe COPD of many years duration. He has mild associated cough.     He has stable since his last visit.     Review of Systems  No Chest pain, Nausea or vomiting reported      Physical Exam:    Well developed, well nourished  Alert and oriented  Sclera is clear  No cervical

## 2025-04-10 NOTE — PROGRESS NOTES
09/09/24 124/82        Physical Examination:    CONSTITUTIONAL: Well developed, well nourished, obese, multiple tattoos  EYES: PERRLA. No xanthelasma, sclera non icteric  EARS,NOSE,MOUTH,THROAT:  Mucous membranes moist, normal hearing  NECK: Supple, JVP normal, thyroid not enlarged. Carotids 2+ without bruits  RESPIRATORY: wheezing on exam   CARDIOVASCULAR: Normal PMI, regular rate and rhythm, no murmurs, rub or gallop.   Trace edema BLE. Radial pulses present and equal  CHEST: No scar or masses  ABDOMEN: Normal bowel sounds. No masses or tenderness. No bruit  MUSCULOSKELETAL: No clubbing or cyanosis. Moves all extremities well. Normal gait  SKIN:  Warm and dry. No rashes  NEUROLOGIC: Cranial nerves intact. Alert and oriented  PSYCHIATRIC: Calm affect. Appears to have normal judgement and insight    Assessment/Plan  1. Coronary artery disease involving native coronary artery of native heart with angina pectoris (HCC) Ischemic Cardiomyopathy since large anterior MI 2017    Plavix QOD and asa daily     ~GXT 2/13/23: There is a moderate size, moderate intensity fixed anterior septal and apical defect c/w scar. There is no ischemia. LV function is mildly decreased with EF=48% with anterior septal and apical hypokinesis. Intermediate risk. (Unchanged from 2018)  ~Cath 1/13/17: ([facility]) LM: MLI, PROX LAD: 100% occluded, MID LAD: 40-50%, DISTAL LAD: 95%, EF 30-35%. Single vessel coronary artery disease with a 100% proximal LAD and 95% distal LAD. Successful overlapping 4.0 x 38 and 4.0 x 16 synergy stents to the proximal LAD  Successful POBA of the distal LAD with a 2.0 balloon  ~Nuclear stress test (9/11/8):marked LV enlargement with anterior wall akinesis. EF 41%. Moderate sized anterior fixed defect of moderate intensity consistent with infarction in the territory of the mid and distal LAD.  ~On DAPT/BB/Statin  CATH 8/2004 [facility] EF 53% minimal CAD and normal RA   2. Essential hypertension - well controlled   ~On cardura,

## 2025-04-23 ENCOUNTER — OFFICE VISIT (OUTPATIENT)
Dept: CARDIOLOGY CLINIC | Age: 75
End: 2025-04-23
Payer: MEDICARE

## 2025-04-23 VITALS
SYSTOLIC BLOOD PRESSURE: 124 MMHG | DIASTOLIC BLOOD PRESSURE: 76 MMHG | HEART RATE: 80 BPM | BODY MASS INDEX: 38.89 KG/M2 | HEIGHT: 71 IN | OXYGEN SATURATION: 97 % | WEIGHT: 277.8 LBS

## 2025-04-23 DIAGNOSIS — I25.119 CORONARY ARTERY DISEASE INVOLVING NATIVE CORONARY ARTERY OF NATIVE HEART WITH ANGINA PECTORIS: Primary | ICD-10-CM

## 2025-04-23 DIAGNOSIS — E78.2 MIXED HYPERLIPIDEMIA: ICD-10-CM

## 2025-04-23 DIAGNOSIS — I10 ESSENTIAL HYPERTENSION: ICD-10-CM

## 2025-04-23 DIAGNOSIS — I65.29 STENOSIS OF CAROTID ARTERY, UNSPECIFIED LATERALITY: ICD-10-CM

## 2025-04-23 PROCEDURE — G2211 COMPLEX E/M VISIT ADD ON: HCPCS | Performed by: INTERNAL MEDICINE

## 2025-04-23 PROCEDURE — 3078F DIAST BP <80 MM HG: CPT | Performed by: INTERNAL MEDICINE

## 2025-04-23 PROCEDURE — 1123F ACP DISCUSS/DSCN MKR DOCD: CPT | Performed by: INTERNAL MEDICINE

## 2025-04-23 PROCEDURE — 1036F TOBACCO NON-USER: CPT | Performed by: INTERNAL MEDICINE

## 2025-04-23 PROCEDURE — G8427 DOCREV CUR MEDS BY ELIG CLIN: HCPCS | Performed by: INTERNAL MEDICINE

## 2025-04-23 PROCEDURE — 3017F COLORECTAL CA SCREEN DOC REV: CPT | Performed by: INTERNAL MEDICINE

## 2025-04-23 PROCEDURE — G8417 CALC BMI ABV UP PARAM F/U: HCPCS | Performed by: INTERNAL MEDICINE

## 2025-04-23 PROCEDURE — 1159F MED LIST DOCD IN RCRD: CPT | Performed by: INTERNAL MEDICINE

## 2025-04-23 PROCEDURE — 3074F SYST BP LT 130 MM HG: CPT | Performed by: INTERNAL MEDICINE

## 2025-04-23 PROCEDURE — 99214 OFFICE O/P EST MOD 30 MIN: CPT | Performed by: INTERNAL MEDICINE

## 2025-07-22 DIAGNOSIS — J44.9 COPD, SEVERE (HCC): Primary | ICD-10-CM

## 2025-07-22 RX ORDER — ROFLUMILAST 500 UG/1
500 TABLET ORAL DAILY
Qty: 90 TABLET | Refills: 3 | Status: SHIPPED | OUTPATIENT
Start: 2025-07-22

## (undated) DEVICE — Device: Brand: MEDEX

## (undated) DEVICE — CYSTO/BLADDER IRRIGATION SET, REGULATING CLAMP

## (undated) DEVICE — Z INACTIVE USE 2635504 SOLUTION IRRIG 3000ML 1.5% GL USP UROMATIC CONT

## (undated) DEVICE — TRAY PREP DRY W/ PREM GLV 2 APPL 6 SPNG 2 UNDPD 1 OVERWRAP

## (undated) DEVICE — CATHETER URETH 22FR BLLN 30CC 3 W F SPEC INF CTRL BARDX

## (undated) DEVICE — SYRINGE MED 30ML STD CLR PLAS LUERSLIP TIP N CTRL DISP

## (undated) DEVICE — 24FR BIPLR COAG ELECTRDE, STERILE: Brand: N.A.

## (undated) DEVICE — ELECTRODE PT RET AD L9FT HI MOIST COND ADH HYDRGEL CORDED

## (undated) DEVICE — CATHETER URETH 20FR BLLN 5CC F 2 W SPEC M OLV COUDE TIP

## (undated) DEVICE — SYRINGE MED 10ML SLIP TIP BLNT FILL AND LUERLOCK DISP

## (undated) DEVICE — STERILE POLYISOPRENE POWDER-FREE SURGICAL GLOVES: Brand: PROTEXIS

## (undated) DEVICE — MERCY FAIRFIELD TURNOVER KIT: Brand: MEDLINE INDUSTRIES, INC.

## (undated) DEVICE — EVACUATOR URO BLDR W/ ADPT UROVAC

## (undated) DEVICE — CORD ES L10FT BLU MPLR FT SWCH DISP ACMI

## (undated) DEVICE — GUIDEWIRE ENDOSCP L150CM DIA0.035IN TIP 3CM PTFE NIT

## (undated) DEVICE — BAG,DRAINAGE,4L,A/R TOWER,LL,SLIDE TAP: Brand: MEDLINE

## (undated) DEVICE — SOLUTION IV IRRIG WATER 1000ML POUR BRL 2F7114

## (undated) DEVICE — BAG DRAINAGE NS

## (undated) DEVICE — Y-TYPE TUR/BLADDER IRRIGATION SET, REGULATING CLAMP

## (undated) DEVICE — GLOVE ORANGE PI 7 1/2   MSG9075

## (undated) DEVICE — Z DUP USE 2522782 SOLUTION IRRIG 1000ML STRL H2O PLAS CONTAINER UROMATIC

## (undated) DEVICE — ELECTRODE,COAG,STERILE,POINTED: Brand: N.A.

## (undated) DEVICE — CYSTO PACK: Brand: MEDLINE INDUSTRIES, INC.

## (undated) DEVICE — CUTTING LOOP, BIPOLAR, 24/26 FR.: Brand: N.A.

## (undated) DEVICE — BAG DRNGE 4000ML CONT IRRIG ROUNDED TEARDROP SHP DISP